# Patient Record
Sex: FEMALE | Race: BLACK OR AFRICAN AMERICAN | NOT HISPANIC OR LATINO | Employment: OTHER | URBAN - NONMETROPOLITAN AREA
[De-identification: names, ages, dates, MRNs, and addresses within clinical notes are randomized per-mention and may not be internally consistent; named-entity substitution may affect disease eponyms.]

---

## 2017-01-27 LAB
CHOLEST SERPL-MSCNC: 125 MG/DL (ref 0–200)
HDLC SERPL-MCNC: 81 MG/DL
LDLC SERPL CALC-MCNC: 34 MG/DL
TRIGL SERPL-MCNC: 50 MG/DL
TSH SERPL DL<=0.005 MIU/L-ACNC: 1.49 UIU/ML (ref 0.41–5.9)

## 2018-07-04 ENCOUNTER — HISTORICAL (OUTPATIENT)
Dept: ADMINISTRATIVE | Facility: HOSPITAL | Age: 83
End: 2018-07-04

## 2018-12-01 ENCOUNTER — HISTORICAL (OUTPATIENT)
Dept: ADMINISTRATIVE | Facility: HOSPITAL | Age: 83
End: 2018-12-01

## 2018-12-19 ENCOUNTER — HISTORICAL (OUTPATIENT)
Dept: ADMINISTRATIVE | Facility: HOSPITAL | Age: 83
End: 2018-12-19

## 2019-02-27 ENCOUNTER — TELEPHONE (OUTPATIENT)
Dept: FAMILY MEDICINE | Facility: CLINIC | Age: 84
End: 2019-02-27

## 2019-02-27 NOTE — TELEPHONE ENCOUNTER
----- Message from Lindsey Barber sent at 2/27/2019 10:04 AM CST -----  Contact: [id number]  Paperwork was dropped off last regarding medicaid long term care; were you able to complete it

## 2019-03-15 ENCOUNTER — OFFICE VISIT (OUTPATIENT)
Dept: RHEUMATOLOGY | Facility: CLINIC | Age: 84
End: 2019-03-15
Payer: MEDICARE

## 2019-03-15 VITALS
DIASTOLIC BLOOD PRESSURE: 60 MMHG | SYSTOLIC BLOOD PRESSURE: 110 MMHG | TEMPERATURE: 97 F | BODY MASS INDEX: 23.14 KG/M2 | WEIGHT: 144 LBS | HEIGHT: 66 IN | RESPIRATION RATE: 16 BRPM | HEART RATE: 72 BPM

## 2019-03-15 DIAGNOSIS — D50.0 IRON DEFICIENCY ANEMIA DUE TO CHRONIC BLOOD LOSS: ICD-10-CM

## 2019-03-15 DIAGNOSIS — E50.7 XEROPHTHALMIA: ICD-10-CM

## 2019-03-15 DIAGNOSIS — I25.10 CORONARY ARTERIOSCLEROSIS: ICD-10-CM

## 2019-03-15 DIAGNOSIS — M48.07 LUMBOSACRAL STENOSIS: ICD-10-CM

## 2019-03-15 DIAGNOSIS — M54.12 CERVICAL RADICULOPATHY: ICD-10-CM

## 2019-03-15 DIAGNOSIS — M11.259: ICD-10-CM

## 2019-03-15 DIAGNOSIS — M81.0 OSTEOPOROSIS, UNSPECIFIED OSTEOPOROSIS TYPE, UNSPECIFIED PATHOLOGICAL FRACTURE PRESENCE: ICD-10-CM

## 2019-03-15 DIAGNOSIS — J44.9 CHRONIC OBSTRUCTIVE PULMONARY DISEASE, UNSPECIFIED COPD TYPE: ICD-10-CM

## 2019-03-15 DIAGNOSIS — M79.7 FIBROSITIS: ICD-10-CM

## 2019-03-15 DIAGNOSIS — G56.00 CARPAL TUNNEL SYNDROME, UNSPECIFIED LATERALITY: ICD-10-CM

## 2019-03-15 DIAGNOSIS — M06.9 RHEUMATOID ARTHRITIS, INVOLVING UNSPECIFIED SITE, UNSPECIFIED RHEUMATOID FACTOR PRESENCE: Primary | ICD-10-CM

## 2019-03-15 DIAGNOSIS — E11.42 DIABETIC POLYNEUROPATHY ASSOCIATED WITH TYPE 2 DIABETES MELLITUS: ICD-10-CM

## 2019-03-15 PROBLEM — M47.817 LUMBOSACRAL SPONDYLOSIS: Status: ACTIVE | Noted: 2019-03-15

## 2019-03-15 PROBLEM — I27.0: Status: ACTIVE | Noted: 2019-03-15

## 2019-03-15 PROBLEM — M54.30 SCIATICA: Status: ACTIVE | Noted: 2019-03-15

## 2019-03-15 PROBLEM — R91.1 SOLITARY PULMONARY NODULE: Status: ACTIVE | Noted: 2019-03-15

## 2019-03-15 PROBLEM — E11.40 DIABETIC NEUROPATHY: Status: ACTIVE | Noted: 2019-03-15

## 2019-03-15 PROBLEM — M16.9 OSTEOARTHRITIS OF HIP: Status: ACTIVE | Noted: 2019-03-15

## 2019-03-15 PROBLEM — D50.9 IRON DEFICIENCY ANEMIA: Status: ACTIVE | Noted: 2019-03-15

## 2019-03-15 PROBLEM — M70.60 TROCHANTERIC BURSITIS: Status: ACTIVE | Noted: 2019-03-15

## 2019-03-15 PROCEDURE — 99214 PR OFFICE/OUTPT VISIT, EST, LEVL IV, 30-39 MIN: ICD-10-PCS | Mod: S$GLB,,, | Performed by: INTERNAL MEDICINE

## 2019-03-15 PROCEDURE — 99214 OFFICE O/P EST MOD 30 MIN: CPT | Mod: S$GLB,,, | Performed by: INTERNAL MEDICINE

## 2019-03-15 RX ORDER — FAMOTIDINE 40 MG/1
TABLET, FILM COATED ORAL
Refills: 6 | COMMUNITY
Start: 2019-02-26 | End: 2020-10-23

## 2019-03-15 RX ORDER — AMLODIPINE BESYLATE 5 MG/1
5 TABLET ORAL EVERY MORNING
Refills: 6 | COMMUNITY
Start: 2019-03-01 | End: 2020-07-06

## 2019-03-15 RX ORDER — PREDNISONE 10 MG/1
TABLET ORAL DAILY
Qty: 30 TABLET | Refills: 2 | Status: SHIPPED | OUTPATIENT
Start: 2019-03-15 | End: 2019-03-25

## 2019-03-15 RX ORDER — RANOLAZINE 500 MG/1
500 TABLET, EXTENDED RELEASE ORAL 2 TIMES DAILY
COMMUNITY
End: 2020-01-07 | Stop reason: SDUPTHER

## 2019-03-15 RX ORDER — METOPROLOL SUCCINATE 50 MG/1
50 TABLET, EXTENDED RELEASE ORAL DAILY
Refills: 3 | COMMUNITY
Start: 2019-03-05 | End: 2020-06-12 | Stop reason: SDUPTHER

## 2019-03-15 RX ORDER — NITROGLYCERIN 0.4 MG/1
TABLET SUBLINGUAL
COMMUNITY
End: 2019-06-25 | Stop reason: SDUPTHER

## 2019-03-15 RX ORDER — SPIRONOLACTONE 25 MG/1
TABLET ORAL
Refills: 5 | COMMUNITY
Start: 2019-03-01 | End: 2019-08-12 | Stop reason: SDUPTHER

## 2019-03-15 RX ORDER — PREDNISONE 10 MG/1
TABLET ORAL
COMMUNITY
End: 2019-05-28 | Stop reason: SDUPTHER

## 2019-03-15 RX ORDER — ATORVASTATIN CALCIUM 40 MG/1
40 TABLET, FILM COATED ORAL NIGHTLY
Refills: 3 | COMMUNITY
Start: 2019-03-06 | End: 2020-05-20

## 2019-03-15 RX ORDER — PREDNISONE 10 MG/1
TABLET ORAL
Qty: 30 TABLET | Refills: 4 | Status: CANCELLED | OUTPATIENT
Start: 2019-03-15

## 2019-03-15 RX ORDER — CEPHALEXIN 250 MG
CAPSULE ORAL
COMMUNITY
End: 2020-07-28

## 2019-03-15 RX ORDER — TRAZODONE HYDROCHLORIDE 50 MG/1
TABLET ORAL
COMMUNITY
End: 2020-07-28

## 2019-03-15 RX ORDER — ASPIRIN 81 MG/1
TABLET ORAL
COMMUNITY
End: 2020-12-14

## 2019-03-15 RX ORDER — DICLOFENAC SODIUM 10 MG/G
GEL TOPICAL
Refills: 0 | COMMUNITY
Start: 2018-12-07 | End: 2019-06-30 | Stop reason: SDUPTHER

## 2019-03-15 RX ORDER — CYCLOSPORINE 0.5 MG/ML
EMULSION OPHTHALMIC
COMMUNITY

## 2019-03-15 RX ORDER — CYCLOBENZAPRINE HCL 5 MG
TABLET ORAL
COMMUNITY
End: 2019-06-18 | Stop reason: SDUPTHER

## 2019-03-15 RX ORDER — GABAPENTIN 300 MG/1
300 CAPSULE ORAL 2 TIMES DAILY
Refills: 3 | COMMUNITY
Start: 2019-03-01 | End: 2019-08-12 | Stop reason: SDUPTHER

## 2019-03-15 NOTE — PROGRESS NOTES
Subjective:       Patient ID: Martha Caraballo is a 88 y.o. female.    Chief Complaint: Follow-up    HPI ADMITTED  to [facility] in [facility] in October with rectal hemorrhage No bleeding site GI bleeding. 2nd amission  Slipped and fall in bathtub no known diagnois. Admitted to [facility] for High Blood pressure out of control  Finally management but hangina and treated with Ranexa with improment of chest pain.with multple arthralgia.Currently on Gabapentin  300 mg Bid. Request a mild analgesic        Current medications include:  Current Outpatient Medications on File Prior to Visit   Medication Sig Dispense Refill    amLODIPine (NORVASC) 5 MG tablet Take 5 mg by mouth every morning.  6    aspirin (ADULT LOW DOSE ASPIRIN) 81 MG EC tablet Adult Low Dose Aspirin   daily      atorvastatin (LIPITOR) 40 MG tablet Take 40 mg by mouth nightly.  3    cyclobenzaprine (FLEXERIL) 5 MG tablet cyclobenzaprine 5 mg tablet   TAKE 1 TABLET BY MOUTH TWICE A DAY      cycloSPORINE (RESTASIS) 0.05 % ophthalmic emulsion Restasis 0.05 % eye drops in a dropperette      famotidine (PEPCID) 40 MG tablet TAKE 1 TABLET BY MOUTH EVERY DAY FOR STOMACH  6    ferrous fumarate-folic acid 324 mg, 106mg iron,-1mg (HEMOCYTE-F) Tab Hematinic/Folic Acid 324 mg (106 mg iron)-1 mg tablet   TAKE 1 TABLET BY MOUTH EVERY DAY      gabapentin (NEURONTIN) 300 MG capsule Take 300 mg by mouth 2 (two) times daily.  3    metoprolol succinate (TOPROL-XL) 50 MG 24 hr tablet Take 50 mg by mouth once daily.  3    nitroGLYCERIN (NITROSTAT) 0.4 MG SL tablet nitroglycerin 0.4 mg sublingual tablet      omega-3s/dha/epa/fish oil/D3 (VITAMIN-D + OMEGA-3 ORAL) Vitamin D   1200mg daily      predniSONE (DELTASONE) 10 MG tablet prednisone 10 mg tablet   1 TAB BY MOUTH 3X WITH MEALS X 3 DAYS,1 TAB 2 TIMES DAILY X 2 DAYS, 1 TAB ONCE A DAY X2 DAYS      ranolazine (RANEXA) 500 MG Tb12 Take 500 mg by mouth 2 (two) times daily.      spironolactone (ALDACTONE) 25 MG  "tablet TAKE ONE TABLET BY MOUTH EVERY MORNING FOR BLOOD PRESSURE  5    traZODone (DESYREL) 50 MG tablet trazodone 50 mg tablet   Take 1 tablet once daily at bedtime      vitamin E 100 unit/0.25 mL Drop vitamin E   daily      VOLTAREN 1 % Gel APPLY TO THE AFFECTED AREA 3 TIMES DAILY FOR SCIATICA PAIN  0     No current facility-administered medications on file prior to visit.        Lab Results:  No results found for: WBC, RBC, HGB, HCT, MCV, COLORU, SPECGRAV, PHUR, WBCUR, NITRITE, GLUCOSEUR, KETONESU, BILIRUBINUR, RBCUR, NA, K, CL, CO2, GLU, BUN, CREATININE     Review of Systems   Constitutional:        N ight weats   HENT:        Dry eyes on restasis and artificial tear drops    Eyes: Negative.    Respiratory: Negative.    Cardiovascular:        Angina pectoris on Ranexa   Gastrointestinal:        Rectal bleeding   Endocrine: Negative.    Genitourinary: Negative.    Musculoskeletal: Positive for arthralgias and back pain.   Allergic/Immunologic: Negative.    Neurological:        Occasional sciatica oain   Hematological:        GI loss  Of blood admission to Hospital   Psychiatric/Behavioral: Negative.          Objective:   /60 (BP Location: Right arm, Patient Position: Sitting, BP Method: Small (Manual))   Pulse 72   Temp 97 °F (36.1 °C) (Temporal)   Resp 16   Ht 5' 6" (1.676 m)   Wt 65.3 kg (144 lb)   BMI 23.24 kg/m²      Physical Exam      Assessment:       1. Rheumatoid arthritis, involving unspecified site, unspecified rheumatoid factor presence    2. 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 Xerophthalmia    4. Lumbosacral stenosis    5. Diabetic polyneuropathy associated with type 2 diabetes mellitus    6. Cervical radiculopathy    7. Carpal tunnel syndrome, unspecified laterality    8. Chronic obstructive pulmonary disease, unspecified COPD type    9. Coronary arteriosclerosis    10. Iron deficiency anemia due to chronic " blood loss    11. Osteoporosis, unspecified osteoporosis type, unspecified pathological fracture presence    12. Fibrositis            Plan:       Has incresed polyarthalgia

## 2019-03-20 DIAGNOSIS — M54.17 LUMBOSACRAL RADICULOPATHY: Primary | ICD-10-CM

## 2019-03-20 RX ORDER — TRAMADOL HYDROCHLORIDE 50 MG/1
50 TABLET ORAL 3 TIMES DAILY PRN
Qty: 90 TABLET | Refills: 2 | Status: SHIPPED | OUTPATIENT
Start: 2019-03-20 | End: 2019-12-18

## 2019-04-15 RX ORDER — ALPRAZOLAM 0.25 MG/1
0.25 TABLET ORAL 3 TIMES DAILY PRN
Qty: 30 TABLET | Refills: 2 | Status: SHIPPED | OUTPATIENT
Start: 2019-04-15 | End: 2020-04-28

## 2019-05-10 ENCOUNTER — TELEPHONE (OUTPATIENT)
Dept: FAMILY MEDICINE | Facility: CLINIC | Age: 84
End: 2019-05-10

## 2019-05-10 DIAGNOSIS — R07.9 CHEST PAIN, UNSPECIFIED TYPE: Primary | ICD-10-CM

## 2019-05-10 NOTE — TELEPHONE ENCOUNTER
Patient is requesting a referral to see a cardiologist and left the antihistamine she has been with chest pain that has been progressively worsening since a year ago or more and would like to have 2nd opinion.

## 2019-05-28 ENCOUNTER — OFFICE VISIT (OUTPATIENT)
Dept: FAMILY MEDICINE | Facility: CLINIC | Age: 84
End: 2019-05-28
Payer: MEDICARE

## 2019-05-28 VITALS
HEART RATE: 74 BPM | RESPIRATION RATE: 14 BRPM | DIASTOLIC BLOOD PRESSURE: 71 MMHG | WEIGHT: 140 LBS | BODY MASS INDEX: 22.6 KG/M2 | OXYGEN SATURATION: 99 % | SYSTOLIC BLOOD PRESSURE: 129 MMHG

## 2019-05-28 DIAGNOSIS — R09.1 PLEURISY: ICD-10-CM

## 2019-05-28 DIAGNOSIS — M94.0 COSTOCHONDRITIS: Primary | ICD-10-CM

## 2019-05-28 DIAGNOSIS — Z12.39 BREAST CANCER SCREENING: ICD-10-CM

## 2019-05-28 PROCEDURE — 96372 THER/PROPH/DIAG INJ SC/IM: CPT | Mod: S$GLB,,, | Performed by: NURSE PRACTITIONER

## 2019-05-28 PROCEDURE — 99213 OFFICE O/P EST LOW 20 MIN: CPT | Mod: 25,S$GLB,, | Performed by: FAMILY MEDICINE

## 2019-05-28 PROCEDURE — 96372 PR INJECTION,THERAP/PROPH/DIAG2ST, IM OR SUBCUT: ICD-10-PCS | Mod: S$GLB,,, | Performed by: NURSE PRACTITIONER

## 2019-05-28 PROCEDURE — 99213 PR OFFICE/OUTPT VISIT, EST, LEVL III, 20-29 MIN: ICD-10-PCS | Mod: 25,S$GLB,, | Performed by: FAMILY MEDICINE

## 2019-05-28 RX ORDER — PREDNISONE 10 MG/1
10 TABLET ORAL DAILY
Qty: 30 TABLET | Refills: 2 | Status: SHIPPED | OUTPATIENT
Start: 2019-05-28 | End: 2020-04-28

## 2019-05-28 RX ORDER — METHYLPREDNISOLONE ACETATE 40 MG/ML
40 INJECTION, SUSPENSION INTRA-ARTICULAR; INTRALESIONAL; INTRAMUSCULAR; SOFT TISSUE
Status: COMPLETED | OUTPATIENT
Start: 2019-05-28 | End: 2019-05-28

## 2019-05-28 RX ADMIN — METHYLPREDNISOLONE ACETATE 40 MG: 40 INJECTION, SUSPENSION INTRA-ARTICULAR; INTRALESIONAL; INTRAMUSCULAR; SOFT TISSUE at 03:05

## 2019-05-29 NOTE — PROGRESS NOTES
"Subjective:       Patient ID: Martha Caraballo is a 88 y.o. female.    Chief Complaint: Follow-up (She would like you to look at  her right " groin area." She said she has a knot.) and Medication Refill (Refill on NTG)    89 yo female with complaint of chest wall pain.  She asked for a referral to another cardiologist for a second opinion.  She has a [facility] on last week.  She states her son was told that it looked okay and no further intervention was needed.  She states that she guess it is arthritis causing he chest wall pain that radiates into her back.  She states it becomes severe and pain meds only helps minimally.  She is unable to take NSAIDS because of hx of rectal hemorrhage. She also need an order for her annual screening mammogram.      Review of Systems   Constitutional: Negative for fever.   HENT: Negative for ear pain, postnasal drip, rhinorrhea, sinus pain and sore throat.    Eyes: Negative for redness.   Respiratory: Negative for cough, chest tightness, shortness of breath and wheezing.    Cardiovascular: Positive for chest pain. Negative for palpitations and leg swelling.   Gastrointestinal: Negative for constipation, diarrhea, nausea and vomiting.   Genitourinary: Negative for difficulty urinating and dysuria.   Musculoskeletal: Negative for arthralgias.   Skin: Negative for rash.   Neurological: Negative for dizziness.       Objective:      Physical Exam   Constitutional: She is oriented to person, place, and time. She appears well-developed and well-nourished.   HENT:   Head: Normocephalic and atraumatic.   Eyes: Pupils are equal, round, and reactive to light. Conjunctivae and EOM are normal.   Neck: Normal range of motion. Neck supple.   Cardiovascular: Normal rate, regular rhythm and normal heart sounds.   Pulmonary/Chest: Effort normal and breath sounds normal. She has no wheezes. She has no rales.         She exhibits tenderness and bony tenderness. She exhibits no crepitus.   Muscle spasms " palpated to the left upper mid back.       Abdominal: Soft. Bowel sounds are normal. She exhibits no distension and no mass. There is no tenderness. There is no guarding.   Musculoskeletal: Normal range of motion. She exhibits no edema or tenderness.   Neurological: She is alert and oriented to person, place, and time. No cranial nerve deficit.   Skin: Skin is warm and dry. No rash noted. No erythema.   Psychiatric: She has a normal mood and affect. Her behavior is normal.   Nursing note and vitals reviewed.      Assessment:       1. Costochondritis    2. Pleurisy    3. Breast cancer screening        Plan:     Depo-Medrol 40 mg IM in clinic today.  Prednisone 10 mg daily for inflammation to the chest wall since NSAIDS are contraindicated in this patient.  Order annual screening mammogram today.  Follow up  In 1 month.

## 2019-06-18 ENCOUNTER — OFFICE VISIT (OUTPATIENT)
Dept: RHEUMATOLOGY | Facility: CLINIC | Age: 84
End: 2019-06-18
Payer: MEDICARE

## 2019-06-18 VITALS
RESPIRATION RATE: 16 BRPM | BODY MASS INDEX: 22.34 KG/M2 | SYSTOLIC BLOOD PRESSURE: 120 MMHG | TEMPERATURE: 97 F | HEART RATE: 72 BPM | DIASTOLIC BLOOD PRESSURE: 60 MMHG | HEIGHT: 66 IN | WEIGHT: 139 LBS

## 2019-06-18 DIAGNOSIS — M79.7 FIBROSITIS: ICD-10-CM

## 2019-06-18 DIAGNOSIS — M16.9 OSTEOARTHRITIS OF HIP, UNSPECIFIED LATERALITY, UNSPECIFIED OSTEOARTHRITIS TYPE: ICD-10-CM

## 2019-06-18 DIAGNOSIS — E11.42 DIABETIC POLYNEUROPATHY ASSOCIATED WITH TYPE 2 DIABETES MELLITUS: ICD-10-CM

## 2019-06-18 DIAGNOSIS — M54.30 SCIATICA, UNSPECIFIED LATERALITY: ICD-10-CM

## 2019-06-18 DIAGNOSIS — E50.7 XEROPHTHALMIA: ICD-10-CM

## 2019-06-18 DIAGNOSIS — M70.60 TROCHANTERIC BURSITIS, UNSPECIFIED LATERALITY: ICD-10-CM

## 2019-06-18 DIAGNOSIS — R25.2 SPASM: Primary | ICD-10-CM

## 2019-06-18 DIAGNOSIS — D50.0 IRON DEFICIENCY ANEMIA DUE TO CHRONIC BLOOD LOSS: ICD-10-CM

## 2019-06-18 DIAGNOSIS — M11.259: ICD-10-CM

## 2019-06-18 DIAGNOSIS — G56.00 CARPAL TUNNEL SYNDROME, UNSPECIFIED LATERALITY: ICD-10-CM

## 2019-06-18 DIAGNOSIS — M54.12 CERVICAL RADICULOPATHY: ICD-10-CM

## 2019-06-18 DIAGNOSIS — I25.10 CORONARY ARTERIOSCLEROSIS: ICD-10-CM

## 2019-06-18 DIAGNOSIS — I27.0 IDIOPATHIC PAH (PULMONARY ARTERIAL HYPERTENSION): ICD-10-CM

## 2019-06-18 PROBLEM — M94.0 COSTAL CHONDRITIS: Status: ACTIVE | Noted: 2019-01-24
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
## 2019-06-18 PROBLEM — M19.019 OSTEOARTHRITIS OF ACROMIOCLAVICULAR JOINT: Status: ACTIVE | Noted: 2019-06-18

## 2019-06-18 PROBLEM — M54.9 BACKACHE WITH RADIATION: Status: ACTIVE | Noted: 2019-06-18

## 2019-06-18 PROBLEM — H35.349 MACULAR HOLE: Status: ACTIVE | Noted: 2019-06-18

## 2019-06-18 PROCEDURE — 99214 OFFICE O/P EST MOD 30 MIN: CPT | Mod: S$GLB,,, | Performed by: INTERNAL MEDICINE

## 2019-06-18 PROCEDURE — 99214 PR OFFICE/OUTPT VISIT, EST, LEVL IV, 30-39 MIN: ICD-10-PCS | Mod: S$GLB,,, | Performed by: INTERNAL MEDICINE

## 2019-06-18 RX ORDER — CYCLOBENZAPRINE HCL 5 MG
TABLET ORAL
Qty: 60 TABLET | Refills: 4 | Status: SHIPPED | OUTPATIENT
Start: 2019-06-18 | End: 2019-06-24 | Stop reason: CLARIF

## 2019-06-18 NOTE — PROGRESS NOTES
Subjective:       Patient ID: Martha Caraballo is a 88 y.o. female.    Chief Complaint: Follow-up    HPI Had costal cage pain epigastric and below shoulder dseen by cardiologis who felt it was costchondritis but given Ranexa for chest pain feels better but some residual chest pain. Chest flexion and rotation makes chest pain. Whole hand hurt daily sometime swell . Takes Gabapentin 300 bid        Current medications include:  Current Outpatient Medications on File Prior to Visit   Medication Sig Dispense Refill    ALPRAZolam (XANAX) 0.25 MG tablet Take 1 tablet (0.25 mg total) by mouth 3 (three) times daily as needed for Anxiety. 30 tablet 2    amLODIPine (NORVASC) 5 MG tablet Take 5 mg by mouth every morning.  6    aspirin (ADULT LOW DOSE ASPIRIN) 81 MG EC tablet Adult Low Dose Aspirin   daily      atorvastatin (LIPITOR) 40 MG tablet Take 40 mg by mouth nightly.  3    cyclobenzaprine (FLEXERIL) 5 MG tablet cyclobenzaprine 5 mg tablet   TAKE 1 TABLET BY MOUTH TWICE A DAY      cycloSPORINE (RESTASIS) 0.05 % ophthalmic emulsion Restasis 0.05 % eye drops in a dropperette      famotidine (PEPCID) 40 MG tablet TAKE 1 TABLET BY MOUTH EVERY DAY FOR STOMACH  6    ferrous fumarate-folic acid 324 mg, 106mg iron,-1mg (HEMOCYTE-F) Tab Hematinic/Folic Acid 324 mg (106 mg iron)-1 mg tablet   TAKE 1 TABLET BY MOUTH EVERY DAY      gabapentin (NEURONTIN) 300 MG capsule Take 300 mg by mouth 2 (two) times daily.  3    metoprolol succinate (TOPROL-XL) 50 MG 24 hr tablet Take 50 mg by mouth once daily.  3    nitroGLYCERIN (NITROSTAT) 0.4 MG SL tablet nitroglycerin 0.4 mg sublingual tablet      omega-3s/dha/epa/fish oil/D3 (VITAMIN-D + OMEGA-3 ORAL) Vitamin D   1200mg daily      predniSONE (DELTASONE) 10 MG tablet Take 1 tablet (10 mg total) by mouth once daily. 30 tablet 2    ranolazine (RANEXA) 500 MG Tb12 Take 500 mg by mouth 2 (two) times daily.      spironolactone (ALDACTONE) 25 MG tablet TAKE ONE TABLET BY MOUTH EVERY  "MORNING FOR BLOOD PRESSURE  5    traMADol (ULTRAM) 50 mg tablet Take 1 tablet (50 mg total) by mouth 3 (three) times daily as needed for Pain. 90 tablet 2    traZODone (DESYREL) 50 MG tablet trazodone 50 mg tablet   Take 1 tablet once daily at bedtime      vitamin E 100 unit/0.25 mL Drop vitamin E   daily      VOLTAREN 1 % Gel APPLY TO THE AFFECTED AREA 3 TIMES DAILY FOR SCIATICA PAIN  0     No current facility-administered medications on file prior to visit.        Lab Results:  No results found for: WBC, RBC, HGB, HCT, MCV, COLORU, SPECGRAV, PHUR, WBCUR, NITRITE, GLUCOSEUR, KETONESU, BILIRUBINUR, RBCUR, NA, K, CL, CO2, GLU, BUN, CREATININE     Review of Systems   Constitutional: Negative.    HENT:        Dry eyes on restasis occasional dry mouth   Eyes: Negative.    Respiratory: Negative.    Cardiovascular: Positive for chest pain.   Gastrointestinal: Positive for constipation.   Endocrine: Negative.    Genitourinary: Negative.    Musculoskeletal: Positive for arthralgias and back pain.   Allergic/Immunologic: Positive for environmental allergies.   Neurological: Negative.    Hematological:        Anemia dx of Thalasemia   Psychiatric/Behavioral: Negative.          Objective:   /60 (BP Location: Right arm, Patient Position: Sitting, BP Method: Small (Manual))   Pulse 72   Temp 96.8 °F (36 °C) (Temporal)   Resp 16   Ht 5' 6" (1.676 m)   Wt 63 kg (139 lb)   BMI 22.44 kg/m²      Physical Exam   Constitutional: She is well-developed, well-nourished, and in no distress.   HENT:   drynessin eyes and mouth and eyes on restasis   Eyes: Conjunctivae and EOM are normal. Pupils are equal, round, and reactive to light.   Neck: Normal range of motion. Neck supple.   Cardiovascular: Normal rate and regular rhythm.    Pulmonary/Chest: Effort normal and breath sounds normal.   Abdominal: Soft. Bowel sounds are normal.   Neurological:   SLR=negative   Skin: Skin is warm and dry.     Musculoskeletal: Normal range of " motion.           Assessment:       1. Spasm    2. 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 Fibrositis    4. Osteoarthritis of hip, unspecified laterality, unspecified osteoarthritis type    5. Sciatica, unspecified laterality    6. Trochanteric bursitis, unspecified laterality    7. Iron deficiency anemia due to chronic blood loss    8. Coronary arteriosclerosis    9. Idiopathic PAH (pulmonary arterial hypertension)    10. Xerophthalmia    11. Cervical radiculopathy    12. Diabetic polyneuropathy associated with type 2 diabetes mellitus    13. Carpal tunnel syndrome, unspecified laterality            Plan:       Continue same medications on wall

## 2019-06-24 DIAGNOSIS — M54.12 CERVICAL RADICULOPATHY: Primary | ICD-10-CM

## 2019-06-24 RX ORDER — TIZANIDINE 2 MG/1
2 TABLET ORAL EVERY 8 HOURS
Qty: 30 TABLET | Refills: 5 | Status: SHIPPED | OUTPATIENT
Start: 2019-06-24 | End: 2019-06-25

## 2019-06-25 ENCOUNTER — OFFICE VISIT (OUTPATIENT)
Dept: FAMILY MEDICINE | Facility: CLINIC | Age: 84
End: 2019-06-25
Payer: MEDICARE

## 2019-06-25 VITALS
WEIGHT: 141 LBS | DIASTOLIC BLOOD PRESSURE: 84 MMHG | OXYGEN SATURATION: 100 % | SYSTOLIC BLOOD PRESSURE: 133 MMHG | TEMPERATURE: 97 F | HEART RATE: 82 BPM | BODY MASS INDEX: 22.76 KG/M2

## 2019-06-25 DIAGNOSIS — M47.27 OSTEOARTHRITIS OF SPINE WITH RADICULOPATHY, LUMBOSACRAL REGION: ICD-10-CM

## 2019-06-25 DIAGNOSIS — I20.89 ANGINA AT REST: ICD-10-CM

## 2019-06-25 DIAGNOSIS — M47.817 SPONDYLOSIS OF LUMBOSACRAL REGION WITHOUT MYELOPATHY OR RADICULOPATHY: ICD-10-CM

## 2019-06-25 DIAGNOSIS — L30.9 DERMATITIS: ICD-10-CM

## 2019-06-25 DIAGNOSIS — K57.92 DIVERTICULITIS: ICD-10-CM

## 2019-06-25 PROCEDURE — 99214 OFFICE O/P EST MOD 30 MIN: CPT | Mod: S$GLB,,, | Performed by: FAMILY MEDICINE

## 2019-06-25 PROCEDURE — 99214 PR OFFICE/OUTPT VISIT, EST, LEVL IV, 30-39 MIN: ICD-10-PCS | Mod: S$GLB,,, | Performed by: FAMILY MEDICINE

## 2019-06-25 RX ORDER — CIPROFLOXACIN 500 MG/1
500 TABLET ORAL EVERY 12 HOURS
Qty: 20 TABLET | Refills: 0 | Status: SHIPPED | OUTPATIENT
Start: 2019-06-25 | End: 2019-06-25 | Stop reason: SDUPTHER

## 2019-06-25 RX ORDER — CIPROFLOXACIN 500 MG/1
500 TABLET ORAL EVERY 12 HOURS
Qty: 20 TABLET | Refills: 0 | Status: SHIPPED | OUTPATIENT
Start: 2019-06-25 | End: 2019-09-18

## 2019-06-25 RX ORDER — ISOSORBIDE MONONITRATE 30 MG/1
1 TABLET, EXTENDED RELEASE ORAL DAILY
COMMUNITY
Start: 2019-06-20 | End: 2020-01-07 | Stop reason: SDUPTHER

## 2019-06-25 RX ORDER — NITROGLYCERIN 0.4 MG/1
0.4 TABLET SUBLINGUAL EVERY 5 MIN PRN
Qty: 30 TABLET | Refills: 2 | Status: SHIPPED | OUTPATIENT
Start: 2019-06-25

## 2019-06-25 RX ORDER — CYCLOBENZAPRINE HCL 5 MG
5 TABLET ORAL 3 TIMES DAILY
Qty: 30 TABLET | Refills: 3 | Status: SHIPPED | OUTPATIENT
Start: 2019-06-25 | End: 2019-07-05

## 2019-06-25 RX ORDER — HYDROCODONE BITARTRATE AND ACETAMINOPHEN 5; 325 MG/1; MG/1
1 TABLET ORAL EVERY 6 HOURS PRN
Qty: 28 TABLET | Refills: 0 | Status: SHIPPED | OUTPATIENT
Start: 2019-06-25 | End: 2019-07-02

## 2019-06-25 RX ORDER — CLOTRIMAZOLE AND BETAMETHASONE DIPROPIONATE 10; .64 MG/G; MG/G
CREAM TOPICAL 2 TIMES DAILY
Qty: 45 TUBE | Refills: 0 | Status: SHIPPED | OUTPATIENT
Start: 2019-06-25 | End: 2020-12-14

## 2019-06-25 NOTE — PROGRESS NOTES
Subjective:       Patient ID: Martha Caraballo is a 88 y.o. female.    Chief Complaint: Follow-up    87 yo  Female in for follow up.  She states that she has been feeling a little better since she was started on isorsorbide nitrates by the cardiologist that she saw in [place].  She had a restenosis of a stent in the RCA.  She reports that she has given 30 days to take the medication and then follow up again.  She also has some discomfort in lower abdomen which is what she experiences when she has the diverticulitis flare-up S she is asking for some Cipro to take for it.  She also needs a refill of hydrocodone that she takes for sciatic nerve pain. She also needs a refill of clotrimazole betamethasone cream that she takes for recurrence rash of her buttocks area.  She also wants me to send a prescription for Flexeril to [facility] [place].  She also needs a refill on nitroglycerin tablets.  She still has some chest pain it is just not as severe as it was previously.    Review of Systems   Constitutional: Negative for fever.   HENT: Negative for ear pain, postnasal drip, rhinorrhea, sinus pain and sore throat.    Eyes: Negative for redness.   Respiratory: Negative for cough, chest tightness, shortness of breath and wheezing.    Cardiovascular: Positive for chest pain. Negative for palpitations and leg swelling.   Gastrointestinal: Negative for constipation, diarrhea, nausea and vomiting.   Genitourinary: Negative for difficulty urinating and dysuria.   Musculoskeletal: Positive for arthralgias, back pain, myalgias and neck pain.   Skin: Negative for rash.   Neurological: Negative for dizziness.       Objective:      Physical Exam   Constitutional: She is oriented to person, place, and time. She appears well-developed and well-nourished.   HENT:   Head: Normocephalic and atraumatic.   Eyes: Pupils are equal, round, and reactive to light. Conjunctivae and EOM are normal.   Neck: Normal range of motion.  Neck supple.   Cardiovascular: Normal rate, regular rhythm and normal heart sounds.   Pulmonary/Chest: Breath sounds normal. She has no wheezes. She has no rales.   Abdominal: Soft. Bowel sounds are normal. She exhibits no distension and no mass. There is no tenderness. There is no guarding.   Musculoskeletal: She exhibits no edema.        Lumbar back: She exhibits tenderness, pain and spasm. She exhibits normal range of motion.   Neurological: She is alert and oriented to person, place, and time. No cranial nerve deficit.   Skin: Skin is warm and dry. Rash noted. No erythema.        Papular eruption to the buttocks crease with mild erythema.   Psychiatric: She has a normal mood and affect. Her behavior is normal.   Nursing note and vitals reviewed.      Assessment:       1. Angina at rest    2. Dermatitis    3. Osteoarthritis of spine with radiculopathy, lumbosacral region    4. Diverticulitis    5. 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  Ultrasound cream to be applied twice daily to rash of buttocks.  Refill hydrocodone 5 mg to be taken 1 every 6 hr as needed for pain #28 no refill.  Ciprofloxacin 500 mg p.o. b.i.d. for diverticulitis.  Flexeril 5 mg b.i.d. for muscle spasms.  Follow-up in 3 months.

## 2019-06-30 DIAGNOSIS — M47.16 OSTEOARTHRITIS OF SPINE WITH MYELOPATHY, LUMBOSACRAL REGION: Primary | ICD-10-CM

## 2019-06-30 DIAGNOSIS — M47.16 OSTEOARTHRITIS OF SPINE WITH MYELOPATHY, LUMBOSACRAL REGION: ICD-10-CM

## 2019-06-30 RX ORDER — DICLOFENAC SODIUM 10 MG/G
GEL TOPICAL
Qty: 100 G | Refills: 5 | Status: SHIPPED | OUTPATIENT
Start: 2019-06-30 | End: 2019-06-30 | Stop reason: SDUPTHER

## 2019-06-30 RX ORDER — DICLOFENAC SODIUM 10 MG/G
GEL TOPICAL 3 TIMES DAILY
Qty: 100 G | Refills: 5 | Status: SHIPPED | OUTPATIENT
Start: 2019-06-30 | End: 2019-06-30 | Stop reason: SDUPTHER

## 2019-06-30 RX ORDER — DICLOFENAC SODIUM 10 MG/G
GEL TOPICAL
Qty: 100 G | Refills: 5 | Status: SHIPPED | OUTPATIENT
Start: 2019-06-30 | End: 2020-07-28

## 2019-07-25 ENCOUNTER — TELEPHONE (OUTPATIENT)
Dept: FAMILY MEDICINE | Facility: CLINIC | Age: 84
End: 2019-07-25

## 2019-07-25 NOTE — TELEPHONE ENCOUNTER
----- Message from Gia Hills sent at 7/25/2019  9:46 AM CDT -----  Contact:  Lindsey [facility]  [phone]  Patient is having burning with urination. Need an order to collect specimen

## 2019-07-26 LAB
AMORPH URATE CRY URNS QL MICRO: NEGATIVE
BACTERIA #/AREA URNS HPF: NORMAL /[HPF]
BILIRUB UR QL STRIP: NEGATIVE
CLARITY UR: CLEAR
COLOR UR: YELLOW
EPITHELIAL CELLS: NORMAL
GLUCOSE (UA): NEGATIVE MG/DL
KETONES UR QL STRIP: NEGATIVE MG/DL
LEUKOCYTE ESTERASE UR QL STRIP: NEGATIVE
MUCOUS THREADS URNS QL MICRO: NEGATIVE
NITRITE UR QL STRIP: NEGATIVE
OCCULT BLOOD: NEGATIVE
PH, URINE: 6 (ref 5–7.5)
PROT UR QL STRIP: NEGATIVE MG/DL
RBC/HPF: NEGATIVE
SP GR UR STRIP: 1.01 (ref 1–1.03)
UROBILINOGEN, URINE: NORMAL E.U./DL (ref 0–1)
WBC/HPF: NEGATIVE

## 2019-07-29 ENCOUNTER — TELEPHONE (OUTPATIENT)
Dept: FAMILY MEDICINE | Facility: CLINIC | Age: 84
End: 2019-07-29

## 2019-08-12 DIAGNOSIS — I10 ESSENTIAL HYPERTENSION: ICD-10-CM

## 2019-08-12 DIAGNOSIS — M54.30 SCIATICA, UNSPECIFIED LATERALITY: Primary | ICD-10-CM

## 2019-08-12 DIAGNOSIS — M54.9 BACKACHE WITH RADIATION: ICD-10-CM

## 2019-08-12 RX ORDER — SPIRONOLACTONE 25 MG/1
25 TABLET ORAL DAILY
Qty: 30 TABLET | Refills: 5 | Status: SHIPPED | OUTPATIENT
Start: 2019-08-12 | End: 2020-02-03

## 2019-08-12 RX ORDER — GABAPENTIN 300 MG/1
300 CAPSULE ORAL 2 TIMES DAILY
Qty: 180 CAPSULE | Refills: 3 | Status: SHIPPED | OUTPATIENT
Start: 2019-08-12 | End: 2020-07-20 | Stop reason: SDUPTHER

## 2019-09-18 ENCOUNTER — OFFICE VISIT (OUTPATIENT)
Dept: RHEUMATOLOGY | Facility: CLINIC | Age: 84
End: 2019-09-18
Payer: MEDICARE

## 2019-09-18 VITALS
HEART RATE: 66 BPM | SYSTOLIC BLOOD PRESSURE: 130 MMHG | WEIGHT: 142 LBS | TEMPERATURE: 97 F | RESPIRATION RATE: 16 BRPM | BODY MASS INDEX: 22.82 KG/M2 | HEIGHT: 66 IN | DIASTOLIC BLOOD PRESSURE: 60 MMHG

## 2019-09-18 DIAGNOSIS — M70.60 TROCHANTERIC BURSITIS, UNSPECIFIED LATERALITY: ICD-10-CM

## 2019-09-18 DIAGNOSIS — I27.0 IDIOPATHIC PAH (PULMONARY ARTERIAL HYPERTENSION): ICD-10-CM

## 2019-09-18 DIAGNOSIS — Z79.899 HISTORY OF ONGOING TREATMENT WITH HIGH-RISK MEDICATION: ICD-10-CM

## 2019-09-18 DIAGNOSIS — M43.07 LUMBOSACRAL SPONDYLOLYSIS: ICD-10-CM

## 2019-09-18 DIAGNOSIS — M54.30 SCIATICA, UNSPECIFIED LATERALITY: ICD-10-CM

## 2019-09-18 DIAGNOSIS — M54.12 CERVICAL RADICULOPATHY: ICD-10-CM

## 2019-09-18 DIAGNOSIS — M16.9 OSTEOARTHRITIS OF HIP, UNSPECIFIED LATERALITY, UNSPECIFIED OSTEOARTHRITIS TYPE: ICD-10-CM

## 2019-09-18 DIAGNOSIS — M48.07 LUMBOSACRAL STENOSIS: ICD-10-CM

## 2019-09-18 DIAGNOSIS — M54.9 BACKACHE WITH RADIATION: ICD-10-CM

## 2019-09-18 DIAGNOSIS — M47.16 OSTEOARTHRITIS OF SPINE WITH MYELOPATHY, LUMBOSACRAL REGION: ICD-10-CM

## 2019-09-18 DIAGNOSIS — E50.7 XEROPHTHALMIA: Primary | ICD-10-CM

## 2019-09-18 DIAGNOSIS — M81.0 OSTEOPOROSIS, UNSPECIFIED OSTEOPOROSIS TYPE, UNSPECIFIED PATHOLOGICAL FRACTURE PRESENCE: ICD-10-CM

## 2019-09-18 PROCEDURE — 99214 PR OFFICE/OUTPT VISIT, EST, LEVL IV, 30-39 MIN: ICD-10-PCS | Mod: S$GLB,,, | Performed by: INTERNAL MEDICINE

## 2019-09-18 PROCEDURE — 99214 OFFICE O/P EST MOD 30 MIN: CPT | Mod: S$GLB,,, | Performed by: INTERNAL MEDICINE

## 2019-09-18 RX ORDER — MELOXICAM 15 MG/1
15 TABLET ORAL DAILY
COMMUNITY
End: 2019-09-18 | Stop reason: SDUPTHER

## 2019-09-18 RX ORDER — MELOXICAM 15 MG/1
15 TABLET ORAL DAILY
Qty: 30 TABLET | Refills: 3 | Status: SHIPPED | OUTPATIENT
Start: 2019-09-18 | End: 2020-04-28

## 2019-09-18 NOTE — PROGRESS NOTES
Subjective:       Patient ID: Martha Caraballo is a 89 y.o. female.    Chief Complaint: Follow-up    HPI dryness in eyes on Rstasis with no dryness in mouth. Had  Pain in lower laef and has developed swelling with edema. Seen at ER [facility] for hip bursitis  Given corticosteroid injection . Hands hurt on and off helped by a short course of Prednisone.Continue on Gabapentin 300 mg BID and meloxicam  7.5 Bid . On calcium and vit D3. Pulmopnary nodule f/up with PCP.        Current medications include:  Current Outpatient Medications on File Prior to Visit   Medication Sig Dispense Refill    ALPRAZolam (XANAX) 0.25 MG tablet Take 1 tablet (0.25 mg total) by mouth 3 (three) times daily as needed for Anxiety. 30 tablet 2    amLODIPine (NORVASC) 5 MG tablet Take 5 mg by mouth every morning.  6    aspirin (ADULT LOW DOSE ASPIRIN) 81 MG EC tablet Adult Low Dose Aspirin   daily      atorvastatin (LIPITOR) 40 MG tablet Take 40 mg by mouth nightly.  3    clotrimazole-betamethasone 1-0.05% (LOTRISONE) cream Apply topically 2 (two) times daily. 45 Tube 0    cycloSPORINE (RESTASIS) 0.05 % ophthalmic emulsion Restasis 0.05 % eye drops in a dropperette      famotidine (PEPCID) 40 MG tablet TAKE 1 TABLET BY MOUTH EVERY DAY FOR STOMACH  6    ferrous fumarate-folic acid 324 mg, 106mg iron,-1mg (HEMOCYTE-F) Tab Hematinic/Folic Acid 324 mg (106 mg iron)-1 mg tablet   TAKE 1 TABLET BY MOUTH EVERY DAY      gabapentin (NEURONTIN) 300 MG capsule Take 1 capsule (300 mg total) by mouth 2 (two) times daily. 180 capsule 3    isosorbide mononitrate (IMDUR) 30 MG 24 hr tablet 1 tablet once daily.      metoprolol succinate (TOPROL-XL) 50 MG 24 hr tablet Take 50 mg by mouth once daily.  3    nitroGLYCERIN (NITROSTAT) 0.4 MG SL tablet Place 1 tablet (0.4 mg total) under the tongue every 5 (five) minutes as needed for Chest pain. 30 tablet 2    omega-3s/dha/epa/fish oil/D3 (VITAMIN-D + OMEGA-3 ORAL) Vitamin D   1200mg daily      predniSONE  "(DELTASONE) 10 MG tablet Take 1 tablet (10 mg total) by mouth once daily. 30 tablet 2    ranolazine (RANEXA) 500 MG Tb12 Take 500 mg by mouth 2 (two) times daily.      spironolactone (ALDACTONE) 25 MG tablet Take 1 tablet (25 mg total) by mouth once daily. 30 tablet 5    traMADol (ULTRAM) 50 mg tablet Take 1 tablet (50 mg total) by mouth 3 (three) times daily as needed for Pain. 90 tablet 2    traZODone (DESYREL) 50 MG tablet trazodone 50 mg tablet   Take 1 tablet once daily at bedtime      vitamin E 100 unit/0.25 mL Drop vitamin E   daily      VOLTAREN 1 % Gel Apply 2 g to affected area three times daily as needed 100 g 5    [DISCONTINUED] meloxicam (MOBIC) 15 MG tablet Take 15 mg by mouth once daily.      [DISCONTINUED] ciprofloxacin HCl (CIPRO) 500 MG tablet Take 1 tablet (500 mg total) by mouth every 12 (twelve) hours. 20 tablet 0     No current facility-administered medications on file prior to visit.        Lab Results:  Color, UA   Date Value Ref Range Status   07/26/2019 YELLOW  Final     Ketones, UA   Date Value Ref Range Status   07/26/2019 NEGATIVE NEGATIVE mg/dL Final        Review of Systems   Constitutional: Negative.    HENT:        Dry eyes and mouth on Restasis   Eyes: Negative.    Respiratory: Negative.    Cardiovascular: Positive for chest pain.        Angina on NTG.   Gastrointestinal:        Heartburns   Endocrine: Negative.    Genitourinary: Negative.    Musculoskeletal: Positive for arthralgias and back pain.   Allergic/Immunologic: Positive for environmental allergies.   Neurological: Negative.          Objective:   /60 (BP Location: Right arm, Patient Position: Sitting, BP Method: Medium (Manual))   Pulse 66   Temp 97 °F (36.1 °C) (Temporal)   Resp 16   Ht 5' 6" (1.676 m)   Wt 64.4 kg (142 lb)   BMI 22.92 kg/m²      Physical Exam   Constitutional: She is well-developed, well-nourished, and in no distress.   HENT:   Head: Normocephalic and atraumatic.   Dryness in eyes "   Eyes: Conjunctivae and EOM are normal. Pupils are equal, round, and reactive to light.   Neck: Normal range of motion. Neck supple.   Cardiovascular: Normal rate and regular rhythm.    Murmur heard.  Pulmonary/Chest: Effort normal and breath sounds normal.   Abdominal: Soft. Bowel sounds are normal.   Neurological:   SLR=negative   Skin: Skin is warm and dry.     Musculoskeletal:   No swelling or or tenderness in peripheral joint           Assessment:       1. Lumbosacral spondylolysis    2. Xerophthalmia    3. Backache with radiation    4. Osteoarthritis of hip, unspecified laterality, unspecified osteoarthritis type    5. Osteoporosis, unspecified osteoporosis type, unspecified pathological fracture presence    6. Sciatica, unspecified laterality    7. Trochanteric bursitis, unspecified laterality    8. Idiopathic PAH (pulmonary arterial hypertension)    9. Osteoarthritis of spine with myelopathy, lumbosacral region    10. Lumbosacral stenosis    11. Cervical radiculopathy            Plan:       Will continue current medications and dosis

## 2019-10-07 ENCOUNTER — OFFICE VISIT (OUTPATIENT)
Dept: FAMILY MEDICINE | Facility: CLINIC | Age: 84
End: 2019-10-07
Payer: MEDICARE

## 2019-10-07 VITALS
WEIGHT: 139.13 LBS | TEMPERATURE: 98 F | HEIGHT: 66 IN | BODY MASS INDEX: 22.36 KG/M2 | OXYGEN SATURATION: 100 % | SYSTOLIC BLOOD PRESSURE: 136 MMHG | HEART RATE: 72 BPM | DIASTOLIC BLOOD PRESSURE: 72 MMHG

## 2019-10-07 DIAGNOSIS — M47.16 OSTEOARTHRITIS OF SPINE WITH MYELOPATHY, LUMBOSACRAL REGION: ICD-10-CM

## 2019-10-07 DIAGNOSIS — I25.110 CORONARY ARTERY DISEASE INVOLVING NATIVE CORONARY ARTERY OF NATIVE HEART WITH UNSTABLE ANGINA PECTORIS: ICD-10-CM

## 2019-10-07 DIAGNOSIS — R26.81 UNSTEADY GAIT: ICD-10-CM

## 2019-10-07 DIAGNOSIS — I10 ESSENTIAL HYPERTENSION: ICD-10-CM

## 2019-10-07 PROCEDURE — 99214 OFFICE O/P EST MOD 30 MIN: CPT | Mod: S$GLB,,, | Performed by: FAMILY MEDICINE

## 2019-10-07 PROCEDURE — 99214 PR OFFICE/OUTPT VISIT, EST, LEVL IV, 30-39 MIN: ICD-10-PCS | Mod: S$GLB,,, | Performed by: FAMILY MEDICINE

## 2019-10-07 NOTE — PROGRESS NOTES
Subjective:       Patient ID: Martha Caraballo is a 89 y.o. female.    Chief Complaint: follow on hypertension and Arthritis    89 yo  Female in for follow up.  She states that she has been feeling a little better since she was started on isorsorbide nitrates by the cardiologist that she saw in [place].  She has been having chest pain intermittently and she nitroglycerin when needed.  She has pain medication to take when needed for her arthritis pain. She would like a referral to go to outpatient physical therapy for unsteady gait.  She uses a cane for balance but still feels off balance at times.    Review of Systems   Constitutional: Negative for fever.   HENT: Negative for ear pain, postnasal drip, rhinorrhea, sinus pain and sore throat.    Eyes: Negative for redness.   Respiratory: Negative for cough, chest tightness, shortness of breath and wheezing.    Cardiovascular: Positive for chest pain. Negative for palpitations and leg swelling.   Gastrointestinal: Negative for constipation, diarrhea, nausea and vomiting.   Genitourinary: Negative for difficulty urinating and dysuria.   Musculoskeletal: Positive for arthralgias, back pain, gait problem, myalgias and neck pain.   Skin: Negative for rash.   Neurological: Positive for weakness. Negative for dizziness.       Objective:     HEENT - wnl  CV - RRR   Lungs - CTA bilaterally  ABD - soft non tender nondistened NABS  Ext - no edema seen.  Neuro - gait unsteady even with cane      Assessment:       1. Essential hypertension    2. Coronary artery disease involving native coronary artery of native heart with unstable angina pectoris    3. Osteoarthritis of spine with myelopathy, lumbosacral region    4. Unsteady gait        Plan:     Hypertension - chronic and controlled on metoprolol.  CAD with angina - followed by cardiology and take the NTG when needed.  Continue the pain medication when needed for arthritis pain and sciatica.  Referral to outpatient therapy for  unsteady gait.

## 2019-12-16 ENCOUNTER — TELEPHONE (OUTPATIENT)
Dept: FAMILY MEDICINE | Facility: CLINIC | Age: 84
End: 2019-12-16

## 2019-12-16 DIAGNOSIS — M47.27 OSTEOARTHRITIS OF SPINE WITH RADICULOPATHY, LUMBOSACRAL REGION: ICD-10-CM

## 2019-12-16 DIAGNOSIS — M48.07 LUMBOSACRAL STENOSIS: Primary | ICD-10-CM

## 2019-12-16 NOTE — TELEPHONE ENCOUNTER
----- Message from Gia Hills sent at 12/16/2019 10:59 AM CST -----  Contact: [facility] [phone] fax#[phone]  Needs a new referral for PT

## 2019-12-17 LAB
ABS NRBC COUNT: 0 X 10 3/UL (ref 0–0.01)
ABSOLUTE BASOPHIL: 0.03 X 10 3/UL (ref 0–0.22)
ABSOLUTE EOSINOPHIL: 0.05 X 10 3/UL (ref 0.04–0.54)
ABSOLUTE IMMATURE GRAN: 0.01 X 10 3/UL (ref 0–0.04)
ABSOLUTE LYMPHOCYTE: 1.66 X 10 3/UL (ref 0.86–4.75)
ABSOLUTE MONOCYTE: 0.78 X 10 3/UL (ref 0.22–1.08)
ALBUMIN SERPL-MCNC: 4.2 G/DL (ref 3.5–5.2)
ALBUMIN/GLOB SERPL ELPH: 1.4 {RATIO} (ref 1–2.7)
ALP ISOS SERPL LEV INH-CCNC: 51 U/L (ref 35–105)
ALT (SGPT): <5 U/L (ref 0–33)
AMORPH URATE CRY URNS QL MICRO: NEGATIVE
ANION GAP SERPL CALC-SCNC: 14 MMOL/L (ref 8–17)
AST SERPL-CCNC: 16 U/L (ref 0–32)
BACTERIA #/AREA URNS HPF: ABNORMAL /[HPF]
BASOPHILS NFR BLD: 0.5 % (ref 0.2–1.2)
BILIRUB UR QL STRIP: NEGATIVE
BILIRUBIN, TOTAL: 0.23 MG/DL (ref 0–1.2)
BUN/CREAT SERPL: 11 (ref 6–20)
CALCIUM SERPL-MCNC: 9.5 MG/DL (ref 8.6–10.2)
CARBON DIOXIDE, CO2: 24 MMOL/L (ref 22–29)
CHLORIDE: 104 MMOL/L (ref 98–107)
CLARITY UR: CLEAR
COLOR UR: YELLOW
CREAT SERPL-MCNC: 0.87 MG/DL (ref 0.5–0.9)
EOSINOPHIL NFR BLD: 0.9 % (ref 0.7–7)
EPITHELIAL CELLS: ABNORMAL
GFR ESTIMATION: 61.31
GLOBULIN: 3 G/DL (ref 1.5–4.5)
GLUCOSE (UA): NEGATIVE MG/DL
GLUCOSE: 70 MG/DL (ref 82–115)
HCT VFR BLD AUTO: 35.2 % (ref 37–47)
HGB BLD-MCNC: 11.3 G/DL (ref 12–16)
IMMATURE GRANULOCYTES: 0.2 % (ref 0–0.5)
KETONES UR QL STRIP: NEGATIVE MG/DL
LEUKOCYTE ESTERASE UR QL STRIP: NEGATIVE
LYMPHOCYTES NFR BLD: 29.2 % (ref 19.3–53.1)
MCH RBC QN AUTO: 29 PG (ref 27–32)
MCHC RBC AUTO-ENTMCNC: 32.1 G/DL (ref 32–36)
MCV RBC AUTO: 90.5 FL (ref 82–100)
MONOCYTES NFR BLD: 13.7 % (ref 4.7–12.5)
MUCOUS THREADS URNS QL MICRO: NEGATIVE
NEUTROPHILS ABSOLUTE COUNT: 3.15 X 10 3/UL (ref 2.15–7.56)
NEUTROPHILS NFR BLD: 55.5 %
NITRITE UR QL STRIP: NEGATIVE
NUCLEATED RED BLOOD CELLS: 0 /100 WBC (ref 0–0.2)
OCCULT BLOOD: NEGATIVE
PH, URINE: 6.5 (ref 5–7.5)
PLATELET # BLD AUTO: 214 X 10 3/UL (ref 135–400)
POTASSIUM: 4.5 MMOL/L (ref 3.5–5.1)
PROT SNV-MCNC: 7.2 G/DL (ref 6.4–8.3)
PROT UR QL STRIP: NEGATIVE MG/DL
RBC # BLD AUTO: 3.89 X 10 6/UL (ref 4.2–5.4)
RBC/HPF: NEGATIVE
RDW-SD: 56.5 FL (ref 37–54)
SED RATE (WESTERGREN): 25 MM/HR (ref 0–30)
SODIUM: 142 MMOL/L (ref 136–145)
SP GR UR STRIP: 1.01 (ref 1–1.03)
UREA NITROGEN (BUN): 9.6 MG/DL (ref 8–23)
UROBILINOGEN, URINE: NORMAL E.U./DL (ref 0–1)
WBC # BLD: 5.68 X 10 3/UL (ref 4.3–10.8)
WBC/HPF: ABNORMAL

## 2019-12-18 ENCOUNTER — OFFICE VISIT (OUTPATIENT)
Dept: RHEUMATOLOGY | Facility: CLINIC | Age: 84
End: 2019-12-18
Payer: MEDICARE

## 2019-12-18 VITALS
RESPIRATION RATE: 16 BRPM | BODY MASS INDEX: 22.18 KG/M2 | TEMPERATURE: 98 F | HEIGHT: 66 IN | SYSTOLIC BLOOD PRESSURE: 120 MMHG | HEART RATE: 72 BPM | OXYGEN SATURATION: 99 % | DIASTOLIC BLOOD PRESSURE: 60 MMHG | WEIGHT: 138 LBS

## 2019-12-18 DIAGNOSIS — M47.16 OSTEOARTHRITIS OF SPINE WITH MYELOPATHY, LUMBOSACRAL REGION: ICD-10-CM

## 2019-12-18 DIAGNOSIS — I20.89 ANGINA AT REST: ICD-10-CM

## 2019-12-18 DIAGNOSIS — M81.0 OSTEOPOROSIS, UNSPECIFIED OSTEOPOROSIS TYPE, UNSPECIFIED PATHOLOGICAL FRACTURE PRESENCE: ICD-10-CM

## 2019-12-18 DIAGNOSIS — J44.9 CHRONIC OBSTRUCTIVE PULMONARY DISEASE, UNSPECIFIED COPD TYPE: ICD-10-CM

## 2019-12-18 DIAGNOSIS — M70.60 TROCHANTERIC BURSITIS, UNSPECIFIED LATERALITY: ICD-10-CM

## 2019-12-18 DIAGNOSIS — I27.0 IDIOPATHIC PAH (PULMONARY ARTERIAL HYPERTENSION): ICD-10-CM

## 2019-12-18 DIAGNOSIS — M94.0 COSTOCHONDRITIS: ICD-10-CM

## 2019-12-18 DIAGNOSIS — E50.7 XEROPHTHALMIA: ICD-10-CM

## 2019-12-18 DIAGNOSIS — M48.07 LUMBOSACRAL STENOSIS: Primary | ICD-10-CM

## 2019-12-18 PROCEDURE — 99214 PR OFFICE/OUTPT VISIT, EST, LEVL IV, 30-39 MIN: ICD-10-PCS | Mod: S$GLB,,, | Performed by: INTERNAL MEDICINE

## 2019-12-18 PROCEDURE — 99214 OFFICE O/P EST MOD 30 MIN: CPT | Mod: S$GLB,,, | Performed by: INTERNAL MEDICINE

## 2019-12-18 PROCEDURE — 1159F MED LIST DOCD IN RCRD: CPT | Mod: S$GLB,,, | Performed by: INTERNAL MEDICINE

## 2019-12-18 PROCEDURE — 1159F PR MEDICATION LIST DOCUMENTED IN MEDICAL RECORD: ICD-10-PCS | Mod: S$GLB,,, | Performed by: INTERNAL MEDICINE

## 2019-12-18 RX ORDER — HYDROCODONE BITARTRATE AND ACETAMINOPHEN 5; 325 MG/1; MG/1
1 TABLET ORAL 2 TIMES DAILY PRN
Qty: 60 TABLET | Refills: 0 | Status: SHIPPED | OUTPATIENT
Start: 2019-12-18 | End: 2020-03-18 | Stop reason: SDUPTHER

## 2019-12-18 RX ORDER — HYDROCODONE BITARTRATE AND ACETAMINOPHEN 5; 325 MG/1; MG/1
1 TABLET ORAL 2 TIMES DAILY PRN
COMMUNITY
End: 2019-12-18 | Stop reason: SDUPTHER

## 2019-12-18 NOTE — PROGRESS NOTES
Subjective:       Patient ID: Martha Caraballo is a 89 y.o. female.    Chief Complaint: Follow-up (with lab results)    HPIMostly pain inn hands and finger with polyarthralgia and has taken prednisone short courses. That helped her with musculoskeleatal complains. Legs hurt with no paresthesia. Has been on meloxicam 15 mg a day with fgi upset and at times  On no antiacids medications./ . Takes Gabapentin 300 mg BID. No doeand no leg edema. On calcium and vitamin D3.. Currently on PT for polyarthralgi a with hx of angina pectoris at rest.        Current medications include:  Current Outpatient Medications on File Prior to Visit   Medication Sig Dispense Refill    ALPRAZolam (XANAX) 0.25 MG tablet Take 1 tablet (0.25 mg total) by mouth 3 (three) times daily as needed for Anxiety. 30 tablet 2    amLODIPine (NORVASC) 5 MG tablet Take 5 mg by mouth every morning.  6    aspirin (ADULT LOW DOSE ASPIRIN) 81 MG EC tablet Adult Low Dose Aspirin   daily      atorvastatin (LIPITOR) 40 MG tablet Take 40 mg by mouth nightly.  3    clotrimazole-betamethasone 1-0.05% (LOTRISONE) cream Apply topically 2 (two) times daily. 45 Tube 0    cycloSPORINE (RESTASIS) 0.05 % ophthalmic emulsion Restasis 0.05 % eye drops in a dropperette      famotidine (PEPCID) 40 MG tablet TAKE 1 TABLET BY MOUTH EVERY DAY FOR STOMACH  6    ferrous fumarate-folic acid 324 mg, 106mg iron,-1mg (HEMOCYTE-F) Tab Hematinic/Folic Acid 324 mg (106 mg iron)-1 mg tablet   TAKE 1 TABLET BY MOUTH EVERY DAY      gabapentin (NEURONTIN) 300 MG capsule Take 1 capsule (300 mg total) by mouth 2 (two) times daily. 180 capsule 3    isosorbide mononitrate (IMDUR) 30 MG 24 hr tablet 1 tablet once daily.      meloxicam (MOBIC) 15 MG tablet Take 1 tablet (15 mg total) by mouth once daily. 30 tablet 3    metoprolol succinate (TOPROL-XL) 50 MG 24 hr tablet Take 50 mg by mouth once daily.  3    nitroGLYCERIN (NITROSTAT) 0.4 MG SL tablet Place 1 tablet (0.4 mg total) under  the tongue every 5 (five) minutes as needed for Chest pain. 30 tablet 2    omega-3s/dha/epa/fish oil/D3 (VITAMIN-D + OMEGA-3 ORAL) Vitamin D   1200mg daily      predniSONE (DELTASONE) 10 MG tablet Take 1 tablet (10 mg total) by mouth once daily. 30 tablet 2    ranolazine (RANEXA) 500 MG Tb12 Take 500 mg by mouth 2 (two) times daily.      spironolactone (ALDACTONE) 25 MG tablet Take 1 tablet (25 mg total) by mouth once daily. 30 tablet 5    traZODone (DESYREL) 50 MG tablet trazodone 50 mg tablet   Take 1 tablet once daily at bedtime      vitamin E 100 unit/0.25 mL Drop vitamin E   daily      VOLTAREN 1 % Gel Apply 2 g to affected area three times daily as needed 100 g 5    [DISCONTINUED] HYDROcodone-acetaminophen (NORCO) 5-325 mg per tablet Take 1 tablet by mouth 2 (two) times daily as needed for Pain.      [DISCONTINUED] traMADol (ULTRAM) 50 mg tablet Take 1 tablet (50 mg total) by mouth 3 (three) times daily as needed for Pain. 90 tablet 2     No current facility-administered medications on file prior to visit.        Lab Results:  WBC   Date Value Ref Range Status   12/17/2019 5.68 4.3 - 10.8 X 10 3/ul Final     Hemoglobin   Date Value Ref Range Status   12/17/2019 11.3 (L) 12 - 16 g/dL Final     Hematocrit   Date Value Ref Range Status   12/17/2019 35.2 (L) 37 - 47 % Final     Color, UA   Date Value Ref Range Status   12/17/2019 YELLOW  Final     Ketones, UA   Date Value Ref Range Status   12/17/2019 NEGATIVE NEGATIVE mg/dL Final     Sodium   Date Value Ref Range Status   12/17/2019 142 136 - 145 mmol/L Final     Potassium   Date Value Ref Range Status   12/17/2019 4.5 3.5 - 5.1 mmol/L Final     Chloride   Date Value Ref Range Status   12/17/2019 104 98 - 107 mmol/L Final     CO2   Date Value Ref Range Status   12/17/2019 24 22 - 29 mmol/L Final     BUN, Bld   Date Value Ref Range Status   12/17/2019 9.6 8 - 23 mg/dL Final     Creatinine   Date Value Ref Range Status   12/17/2019 0.87 0.50 - 0.90 mg/dL  "Final        Review of Systems   Constitutional: Positive for diaphoresis.   HENT:        Dry eyes on Restasis and sip more water for the dry mouth   Eyes: Negative.  Negative for discharge.   Respiratory: Negative.    Cardiovascular:        Angina pectoris    Gastrointestinal:        Had rectal bleedig 1 1/2 year at [facility].    Endocrine: Negative.    Genitourinary: Negative.    Musculoskeletal: Positive for arthralgias and back pain.   Allergic/Immunologic: Positive for environmental allergies.   Neurological: Negative.    Hematological:        Chronic anemia on Iron po.   Psychiatric/Behavioral: Negative.          Objective:   /60 (BP Location: Right arm, Patient Position: Sitting, BP Method: Medium (Manual))   Pulse 72   Temp 98.4 °F (36.9 °C) (Temporal)   Resp 16   Ht 5' 6" (1.676 m)   Wt 62.6 kg (138 lb)   SpO2 99%   BMI 22.27 kg/m²      Physical Exam   Constitutional:   slender   HENT:   Dryness in mouth   Eyes: Conjunctivae and EOM are normal. Pupils are equal, round, and reactive to light.   Neck: Normal range of motion. Neck supple.   Cardiovascular: Normal rate and regular rhythm.    Pulmonary/Chest: Effort normal and breath sounds normal.   Abdominal: Soft. Bowel sounds are normal.   Neurological:   SLR=positive bilaterally   Skin: Skin is warm and dry.     Psychiatric: Mood, memory, affect and judgment normal.   Musculoskeletal:   tenderness in  Shoulder neck area cervical and LS and SI..tenderness in fionger mcp and hip greater trochanter bursitis.           Assessment:       1. Lumbosacral stenosis    2. Osteoarthritis of spine with myelopathy, lumbosacral region    3. Xerophthalmia    4. Idiopathic PAH (pulmonary arterial hypertension)    5. Chronic obstructive pulmonary disease, unspecified COPD type    6. Angina at rest    7. Trochanteric bursitis, unspecified laterality    8. Osteoporosis, unspecified osteoporosis type, unspecified pathological fracture presence    9. " Costochondritis            Plan:        will continue on iron tbs for anemia and sme medications and dosis.take Latesha pentin 300  I1 in am and 2 caps HS.

## 2020-01-07 ENCOUNTER — OFFICE VISIT (OUTPATIENT)
Dept: FAMILY MEDICINE | Facility: CLINIC | Age: 85
End: 2020-01-07
Payer: MEDICARE

## 2020-01-07 VITALS
HEIGHT: 66 IN | DIASTOLIC BLOOD PRESSURE: 75 MMHG | OXYGEN SATURATION: 100 % | TEMPERATURE: 97 F | HEART RATE: 66 BPM | BODY MASS INDEX: 21.76 KG/M2 | SYSTOLIC BLOOD PRESSURE: 159 MMHG | WEIGHT: 135.38 LBS

## 2020-01-07 DIAGNOSIS — I10 ESSENTIAL HYPERTENSION: ICD-10-CM

## 2020-01-07 DIAGNOSIS — I25.10 CORONARY ARTERY DISEASE, ANGINA PRESENCE UNSPECIFIED, UNSPECIFIED VESSEL OR LESION TYPE, UNSPECIFIED WHETHER NATIVE OR TRANSPLANTED HEART: ICD-10-CM

## 2020-01-07 DIAGNOSIS — R22.43 LOCALIZED SWELLING, MASS, OR LUMP OF LOWER EXTREMITY, BILATERAL: ICD-10-CM

## 2020-01-07 DIAGNOSIS — K21.9 GASTROESOPHAGEAL REFLUX DISEASE, ESOPHAGITIS PRESENCE NOT SPECIFIED: ICD-10-CM

## 2020-01-07 PROCEDURE — 99214 OFFICE O/P EST MOD 30 MIN: CPT | Mod: S$GLB,,, | Performed by: FAMILY MEDICINE

## 2020-01-07 PROCEDURE — 1159F PR MEDICATION LIST DOCUMENTED IN MEDICAL RECORD: ICD-10-PCS | Mod: S$GLB,,, | Performed by: FAMILY MEDICINE

## 2020-01-07 PROCEDURE — 99214 PR OFFICE/OUTPT VISIT, EST, LEVL IV, 30-39 MIN: ICD-10-PCS | Mod: S$GLB,,, | Performed by: FAMILY MEDICINE

## 2020-01-07 PROCEDURE — 1159F MED LIST DOCD IN RCRD: CPT | Mod: S$GLB,,, | Performed by: FAMILY MEDICINE

## 2020-01-07 RX ORDER — OMEPRAZOLE 40 MG/1
40 CAPSULE, DELAYED RELEASE ORAL DAILY
Qty: 30 CAPSULE | Refills: 5 | Status: SHIPPED | OUTPATIENT
Start: 2020-01-07 | End: 2020-06-17

## 2020-01-07 RX ORDER — ISOSORBIDE MONONITRATE 30 MG/1
30 TABLET, EXTENDED RELEASE ORAL DAILY
Qty: 30 TABLET | Refills: 5 | Status: SHIPPED | OUTPATIENT
Start: 2020-01-07 | End: 2020-06-12 | Stop reason: SDUPTHER

## 2020-01-07 RX ORDER — RANOLAZINE 500 MG/1
500 TABLET, EXTENDED RELEASE ORAL 2 TIMES DAILY
Qty: 60 TABLET | Refills: 5 | Status: SHIPPED | OUTPATIENT
Start: 2020-01-07 | End: 2020-04-28

## 2020-01-07 NOTE — PROGRESS NOTES
Subjective:      Patient ID: Martha Caraballo is a 89 y.o. female.    Chief Complaint: Follow-up and Hypertension    89-year-old female in for follow-up.  Patient states she has been doing fairly well.  She is glad she was able to go to physical therapy because it has really been helping her with her balance.  She is concerned about some swelling that she is having to her inner thigh area.  She went to see her cardiologist on yesterday because she was concerned the swelling could be coming from a heart.  She does have an echocardiogram ordered and was prescribed hydrochlorothiazide to start taking.  She states the acid reflux disease is getting worse and not responding to the famotidine that she has been taking for it.  She needs a refill of Ranexa and Imdur that she takes for her heart.    Review of Systems   Constitutional: Negative for fever.   HENT: Negative for ear pain, postnasal drip, rhinorrhea, sinus pain and sore throat.    Eyes: Negative for redness.   Respiratory: Negative for cough, chest tightness, shortness of breath and wheezing.    Cardiovascular: Positive for leg swelling. Negative for chest pain and palpitations.   Gastrointestinal: Negative for constipation, diarrhea, nausea and vomiting.        Heartburn   Genitourinary: Negative for difficulty urinating and dysuria.   Musculoskeletal: Negative for arthralgias.   Skin: Negative for rash.   Neurological: Negative for dizziness.     Medication List with Changes/Refills   New Medications    OMEPRAZOLE (PRILOSEC) 40 MG CAPSULE    Take 1 capsule (40 mg total) by mouth once daily.   Current Medications    ALPRAZOLAM (XANAX) 0.25 MG TABLET    Take 1 tablet (0.25 mg total) by mouth 3 (three) times daily as needed for Anxiety.    AMLODIPINE (NORVASC) 5 MG TABLET    Take 5 mg by mouth every morning.    ASPIRIN (ADULT LOW DOSE ASPIRIN) 81 MG EC TABLET    Adult Low Dose Aspirin   daily    ATORVASTATIN (LIPITOR) 40 MG TABLET    Take 40 mg by mouth nightly.     CLOTRIMAZOLE-BETAMETHASONE 1-0.05% (LOTRISONE) CREAM    Apply topically 2 (two) times daily.    CYCLOSPORINE (RESTASIS) 0.05 % OPHTHALMIC EMULSION    Restasis 0.05 % eye drops in a dropperette    FERROUS FUMARATE-FOLIC ACID 324 MG, 106MG IRON,-1MG (HEMOCYTE-F) TAB    Hematinic/Folic Acid 324 mg (106 mg iron)-1 mg tablet   TAKE 1 TABLET BY MOUTH EVERY DAY    GABAPENTIN (NEURONTIN) 300 MG CAPSULE    Take 1 capsule (300 mg total) by mouth 2 (two) times daily.    HYDROCODONE-ACETAMINOPHEN (NORCO) 5-325 MG PER TABLET    Take 1 tablet by mouth 2 (two) times daily as needed for Pain. Greater than 7 day supply is medically necessary.    MELOXICAM (MOBIC) 15 MG TABLET    Take 1 tablet (15 mg total) by mouth once daily.    METOPROLOL SUCCINATE (TOPROL-XL) 50 MG 24 HR TABLET    Take 50 mg by mouth once daily.    NITROGLYCERIN (NITROSTAT) 0.4 MG SL TABLET    Place 1 tablet (0.4 mg total) under the tongue every 5 (five) minutes as needed for Chest pain.    OMEGA-3S/DHA/EPA/FISH OIL/D3 (VITAMIN-D + OMEGA-3 ORAL)    Vitamin D   1200mg daily    PREDNISONE (DELTASONE) 10 MG TABLET    Take 1 tablet (10 mg total) by mouth once daily.    SPIRONOLACTONE (ALDACTONE) 25 MG TABLET    Take 1 tablet (25 mg total) by mouth once daily.    TRAZODONE (DESYREL) 50 MG TABLET    trazodone 50 mg tablet   Take 1 tablet once daily at bedtime    VITAMIN E 100 UNIT/0.25 ML DROP    vitamin E   daily    VOLTAREN 1 % GEL    Apply 2 g to affected area three times daily as needed   Changed and/or Refilled Medications    Modified Medication Previous Medication    ISOSORBIDE MONONITRATE (IMDUR) 30 MG 24 HR TABLET isosorbide mononitrate (IMDUR) 30 MG 24 hr tablet       Take 1 tablet (30 mg total) by mouth once daily.    1 tablet once daily.    RANOLAZINE (RANEXA) 500 MG TB12 ranolazine (RANEXA) 500 MG Tb12       Take 1 tablet (500 mg total) by mouth 2 (two) times daily.    Take 500 mg by mouth 2 (two) times daily.   Discontinued Medications    FAMOTIDINE  "(PEPCID) 40 MG TABLET    TAKE 1 TABLET BY MOUTH EVERY DAY FOR STOMACH      Objective:   BP (!) 159/75 (BP Location: Left arm, Patient Position: Sitting, BP Method: Medium (Automatic))   Pulse 66   Temp 97.4 °F (36.3 °C)   Ht 5' 6" (1.676 m)   Wt 61.4 kg (135 lb 6 oz)   SpO2 100%   BMI 21.85 kg/m²    Estimated body mass index is 21.85 kg/m² as calculated from the following:    Height as of this encounter: 5' 6" (1.676 m).    Weight as of this encounter: 61.4 kg (135 lb 6 oz).   Physical Exam   Constitutional: She is oriented to person, place, and time. She appears well-developed and well-nourished.   HENT:   Head: Normocephalic and atraumatic.   Right Ear: Hearing and tympanic membrane normal.   Left Ear: Hearing and tympanic membrane normal.   Nose: Nose normal.   Mouth/Throat: Uvula is midline, oropharynx is clear and moist and mucous membranes are normal.   Eyes: Pupils are equal, round, and reactive to light. Conjunctivae and EOM are normal.   Neck: Normal range of motion. Neck supple.   Cardiovascular: Normal rate, regular rhythm and normal heart sounds.   Pulmonary/Chest: Breath sounds normal. She has no wheezes. She has no rales.   Abdominal: Soft. Bowel sounds are normal. She exhibits no distension and no mass. There is no tenderness. There is no guarding.   Musculoskeletal: Normal range of motion. She exhibits no edema or tenderness.        Right upper leg: She exhibits swelling.        Left upper leg: She exhibits swelling.   Neurological: She is alert and oriented to person, place, and time. No cranial nerve deficit.   Skin: Skin is warm and dry. No rash noted. No erythema.   Psychiatric: She has a normal mood and affect. Her speech is normal and behavior is normal. Judgment and thought content normal. Cognition and memory are normal.   Vitals reviewed.    Lab Results   Component Value Date    WBC 5.68 12/17/2019    HGB 11.3 (L) 12/17/2019    HCT 35.2 (L) 12/17/2019     12/17/2019    AST 16 " 12/17/2019     12/17/2019    K 4.5 12/17/2019     12/17/2019    CREATININE 0.87 12/17/2019    BUN 9.6 12/17/2019    CO2 24 12/17/2019      Assessment:      Problem List Items Addressed This Visit        Cardiac/Vascular    Coronary artery disease    Relevant Medications    ranolazine (RANEXA) 500 MG Tb12    isosorbide mononitrate (IMDUR) 30 MG 24 hr tablet    Essential hypertension       GI    Gastroesophageal reflux disease    Relevant Medications    omeprazole (PRILOSEC) 40 MG capsule       Other    Localized swelling, mass, or lump of lower extremity, bilateral           Plan:   Hypertension is chronic and well controlled on current medications.  Start Prilosec 40 mg capsule daily for gastroesophageal reflux disease.  She will stop the famotidine for now.  Refill Ranexa and Imdur for coronary artery disease.  I am not sure with the swelling of the inner thigh area that she complains of is coming from but it appears more like decreased muscle mass causing the skin to be more lose in that area rather than swelling to me.  I have encouraged her to continue her follow-up with the cardiologist just to ensure that her heart is okay.  She only has what she is calling swelling in the inner thigh area but her lower extremities and feet are normal.

## 2020-01-27 ENCOUNTER — TELEPHONE (OUTPATIENT)
Dept: FAMILY MEDICINE | Facility: CLINIC | Age: 85
End: 2020-01-27

## 2020-01-27 NOTE — TELEPHONE ENCOUNTER
----- Message from [place] sent at 2020 10:19 AM CST -----  Contact: Aleta Littlejohn   fax # [phone]  Patient is attending physical therapy there and it has  needs a new plan of care or referral .  over the weekend need ASAP there to be seen now

## 2020-02-02 DIAGNOSIS — I10 ESSENTIAL HYPERTENSION: ICD-10-CM

## 2020-02-03 RX ORDER — SPIRONOLACTONE 25 MG/1
TABLET ORAL
Qty: 90 TABLET | Refills: 1 | Status: SHIPPED | OUTPATIENT
Start: 2020-02-03 | End: 2020-06-12 | Stop reason: SDUPTHER

## 2020-03-18 ENCOUNTER — OFFICE VISIT (OUTPATIENT)
Dept: RHEUMATOLOGY | Facility: CLINIC | Age: 85
End: 2020-03-18
Payer: MEDICARE

## 2020-03-18 VITALS
WEIGHT: 133 LBS | RESPIRATION RATE: 16 BRPM | TEMPERATURE: 97 F | OXYGEN SATURATION: 99 % | HEART RATE: 75 BPM | BODY MASS INDEX: 21.38 KG/M2 | SYSTOLIC BLOOD PRESSURE: 140 MMHG | HEIGHT: 66 IN | DIASTOLIC BLOOD PRESSURE: 62 MMHG

## 2020-03-18 DIAGNOSIS — I25.10 CORONARY ARTERY DISEASE, ANGINA PRESENCE UNSPECIFIED, UNSPECIFIED VESSEL OR LESION TYPE, UNSPECIFIED WHETHER NATIVE OR TRANSPLANTED HEART: ICD-10-CM

## 2020-03-18 DIAGNOSIS — E11.42 DIABETIC POLYNEUROPATHY ASSOCIATED WITH TYPE 2 DIABETES MELLITUS: ICD-10-CM

## 2020-03-18 DIAGNOSIS — M48.07 LUMBOSACRAL STENOSIS: ICD-10-CM

## 2020-03-18 DIAGNOSIS — M54.12 CERVICAL RADICULOPATHY: ICD-10-CM

## 2020-03-18 DIAGNOSIS — D50.0 IRON DEFICIENCY ANEMIA DUE TO CHRONIC BLOOD LOSS: ICD-10-CM

## 2020-03-18 DIAGNOSIS — G56.00 CARPAL TUNNEL SYNDROME, UNSPECIFIED LATERALITY: ICD-10-CM

## 2020-03-18 DIAGNOSIS — Z79.899 HISTORY OF ONGOING TREATMENT WITH HIGH-RISK MEDICATION: ICD-10-CM

## 2020-03-18 DIAGNOSIS — R91.1 SOLITARY PULMONARY NODULE: Primary | ICD-10-CM

## 2020-03-18 DIAGNOSIS — I20.89 ANGINA AT REST: ICD-10-CM

## 2020-03-18 DIAGNOSIS — M94.0 COSTOCHONDRITIS: ICD-10-CM
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
## 2020-03-18 DIAGNOSIS — E50.7 XEROPHTHALMIA: ICD-10-CM

## 2020-03-18 DIAGNOSIS — M54.30 SCIATICA, UNSPECIFIED LATERALITY: ICD-10-CM

## 2020-03-18 DIAGNOSIS — M47.27 OSTEOARTHRITIS OF SPINE WITH RADICULOPATHY, LUMBOSACRAL REGION: ICD-10-CM

## 2020-03-18 DIAGNOSIS — M70.60 TROCHANTERIC BURSITIS, UNSPECIFIED LATERALITY: ICD-10-CM

## 2020-03-18 DIAGNOSIS — I27.0 IDIOPATHIC PAH (PULMONARY ARTERIAL HYPERTENSION): ICD-10-CM

## 2020-03-18 PROCEDURE — 99214 PR OFFICE/OUTPT VISIT, EST, LEVL IV, 30-39 MIN: ICD-10-PCS | Mod: S$GLB,,, | Performed by: INTERNAL MEDICINE

## 2020-03-18 PROCEDURE — 99214 OFFICE O/P EST MOD 30 MIN: CPT | Mod: S$GLB,,, | Performed by: INTERNAL MEDICINE

## 2020-03-18 RX ORDER — HYDROCODONE BITARTRATE AND ACETAMINOPHEN 5; 325 MG/1; MG/1
1 TABLET ORAL 2 TIMES DAILY PRN
Qty: 60 TABLET | Refills: 0 | Status: SHIPPED | OUTPATIENT
Start: 2020-03-18 | End: 2020-04-30 | Stop reason: SDUPTHER
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
## 2020-04-24 ENCOUNTER — TELEPHONE (OUTPATIENT)
Dept: INTERNAL MEDICINE | Facility: CLINIC | Age: 85
End: 2020-04-24

## 2020-04-24 NOTE — TELEPHONE ENCOUNTER
----- Message from Chio Meade sent at 4/24/2020  1:06 PM CDT -----  Contact: self  Requesting call back regarding getting re establish. Please call back at [phone].

## 2020-04-28 ENCOUNTER — OFFICE VISIT (OUTPATIENT)
Dept: INTERNAL MEDICINE | Facility: CLINIC | Age: 85
End: 2020-04-28
Payer: MEDICARE

## 2020-04-28 DIAGNOSIS — M47.16 OSTEOARTHRITIS OF SPINE WITH MYELOPATHY, LUMBOSACRAL REGION: Primary | ICD-10-CM

## 2020-04-28 DIAGNOSIS — Z23 NEED FOR SHINGLES VACCINE: ICD-10-CM

## 2020-04-28 DIAGNOSIS — I10 ESSENTIAL HYPERTENSION: ICD-10-CM

## 2020-04-28 DIAGNOSIS — R07.89 CHEST WALL PAIN: ICD-10-CM

## 2020-04-28 PROBLEM — M94.0 COSTOCHONDRITIS: Status: RESOLVED | Noted: 2019-05-28 | Resolved: 2020-04-28

## 2020-04-28 PROBLEM — M94.0 COSTAL CHONDRITIS: Status: RESOLVED | Noted: 2019-01-24 | Resolved: 2020-04-28

## 2020-04-28 PROCEDURE — 99441 PR PHYSICIAN TELEPHONE EVALUATION 5-10 MIN: ICD-10-PCS | Mod: 95,,, | Performed by: INTERNAL MEDICINE

## 2020-04-28 PROCEDURE — 99441 PR PHYSICIAN TELEPHONE EVALUATION 5-10 MIN: CPT | Mod: 95,,, | Performed by: INTERNAL MEDICINE
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
## 2020-04-30 RX ORDER — HYDROCODONE BITARTRATE AND ACETAMINOPHEN 5; 325 MG/1; MG/1
1 TABLET ORAL 2 TIMES DAILY PRN
Qty: 30 TABLET | Refills: 0 | Status: SHIPPED | OUTPATIENT
Start: 2020-04-30 | End: 2020-10-23 | Stop reason: SDUPTHER

## 2020-05-20 ENCOUNTER — OFFICE VISIT (OUTPATIENT)
Dept: INTERNAL MEDICINE | Facility: CLINIC | Age: 85
End: 2020-05-20
Payer: MEDICARE

## 2020-05-20 VITALS
HEIGHT: 66 IN | DIASTOLIC BLOOD PRESSURE: 70 MMHG | WEIGHT: 137 LBS | SYSTOLIC BLOOD PRESSURE: 155 MMHG | BODY MASS INDEX: 22.02 KG/M2 | TEMPERATURE: 98 F | HEART RATE: 79 BPM | OXYGEN SATURATION: 99 %

## 2020-05-20 DIAGNOSIS — I10 ESSENTIAL HYPERTENSION: ICD-10-CM

## 2020-05-20 DIAGNOSIS — I25.10 CORONARY ARTERY DISEASE, ANGINA PRESENCE UNSPECIFIED, UNSPECIFIED VESSEL OR LESION TYPE, UNSPECIFIED WHETHER NATIVE OR TRANSPLANTED HEART: Primary | ICD-10-CM

## 2020-05-20 DIAGNOSIS — R10.13 EPIGASTRIC PAIN: ICD-10-CM

## 2020-05-20 PROBLEM — M47.812 CERVICAL SPONDYLOSIS: Status: ACTIVE | Noted: 2019-03-15

## 2020-05-20 PROBLEM — M54.17 LUMBOSACRAL RADICULOPATHY: Status: ACTIVE | Noted: 2019-03-15

## 2020-05-20 PROBLEM — G45.9 TRANSIENT ISCHEMIC ATTACK: Status: ACTIVE | Noted: 2020-05-20

## 2020-05-20 PROCEDURE — 99214 OFFICE O/P EST MOD 30 MIN: CPT | Mod: S$GLB,,, | Performed by: INTERNAL MEDICINE

## 2020-05-20 PROCEDURE — 99214 PR OFFICE/OUTPT VISIT, EST, LEVL IV, 30-39 MIN: ICD-10-PCS | Mod: S$GLB,,, | Performed by: INTERNAL MEDICINE

## 2020-05-20 RX ORDER — PREDNISONE 10 MG/1
TABLET ORAL
COMMUNITY
Start: 2020-04-30 | End: 2020-07-28 | Stop reason: SDUPTHER

## 2020-05-20 RX ORDER — IPRATROPIUM BROMIDE 42 UG/1
SPRAY, METERED NASAL
COMMUNITY
Start: 2020-05-14 | End: 2022-03-17

## 2020-05-20 RX ORDER — HYDROCHLOROTHIAZIDE 25 MG/1
TABLET ORAL
COMMUNITY
Start: 2020-04-09 | End: 2020-12-14
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
## 2020-06-02 ENCOUNTER — PATIENT OUTREACH (OUTPATIENT)
Dept: ADMINISTRATIVE | Facility: HOSPITAL | Age: 85
End: 2020-06-02

## 2020-06-11 ENCOUNTER — PATIENT OUTREACH (OUTPATIENT)
Dept: ADMINISTRATIVE | Facility: HOSPITAL | Age: 85
End: 2020-06-11

## 2020-06-12 DIAGNOSIS — I25.10 CORONARY ARTERY DISEASE, ANGINA PRESENCE UNSPECIFIED, UNSPECIFIED VESSEL OR LESION TYPE, UNSPECIFIED WHETHER NATIVE OR TRANSPLANTED HEART: ICD-10-CM

## 2020-06-12 DIAGNOSIS — I10 ESSENTIAL HYPERTENSION: ICD-10-CM

## 2020-06-12 RX ORDER — PANTOPRAZOLE SODIUM 40 MG/1
TABLET, DELAYED RELEASE ORAL
COMMUNITY
Start: 2020-05-20 | End: 2020-06-12 | Stop reason: SDUPTHER

## 2020-06-12 NOTE — TELEPHONE ENCOUNTER
----- Message from Mitchell Stinson sent at 6/11/2020  2:52 PM CDT -----  Contact: Pt  Please call [place] to discuss a medication question she has [phone] (home).

## 2020-06-17 RX ORDER — ISOSORBIDE MONONITRATE 30 MG/1
30 TABLET, EXTENDED RELEASE ORAL DAILY
Qty: 30 TABLET | Refills: 5 | Status: SHIPPED | OUTPATIENT
Start: 2020-06-17 | End: 2021-01-04

## 2020-06-17 RX ORDER — SPIRONOLACTONE 25 MG/1
25 TABLET ORAL DAILY
Qty: 90 TABLET | Refills: 1 | Status: SHIPPED | OUTPATIENT
Start: 2020-06-17 | End: 2021-02-24

## 2020-06-17 RX ORDER — PANTOPRAZOLE SODIUM 40 MG/1
40 TABLET, DELAYED RELEASE ORAL DAILY
Qty: 30 TABLET | Refills: 0 | Status: SHIPPED | OUTPATIENT
Start: 2020-06-17 | End: 2020-07-17

## 2020-06-17 RX ORDER — METOPROLOL SUCCINATE 50 MG/1
50 TABLET, EXTENDED RELEASE ORAL DAILY
Qty: 30 TABLET | Refills: 3 | Status: SHIPPED | OUTPATIENT
Start: 2020-06-17 | End: 2021-05-27

## 2020-07-06 RX ORDER — AMLODIPINE BESYLATE 5 MG/1
TABLET ORAL
Qty: 90 TABLET | Refills: 1 | Status: SHIPPED | OUTPATIENT
Start: 2020-07-06 | End: 2020-07-29

## 2020-07-06 NOTE — TELEPHONE ENCOUNTER
----- Message from Mitchell Stinson sent at 7/2/2020  2:22 PM CDT -----  Regarding: Refill  Type:  RX Refill Request    Who Called: Martha  Refill or New Rx:Refill  RX Name and Strength:amLODIPine (NORVASC) 5 MG tablet/isosorbide mononitrate (IMDUR) 30 MG 24 hr tablet/sucralfate 1gm   How is the patient currently taking it? (ex. 1XDay):  Is this a 30 day or 90 day RX:30  Preferred Pharmacy with phone number:  [facility]/pharm[facility] #[id number] - [place] - [address]  [address] [place] [address]  Phone: [phone] Fax: [phone]  Local or Mail Order:Local  Ordering Provider:Dr. Sanchez  Would the patient rather a call back or a response via [facility]? Call back  Best Call Back Number:[phone] (home)   Additional Information: na

## 2020-07-15 ENCOUNTER — TELEPHONE (OUTPATIENT)
Dept: INTERNAL MEDICINE | Facility: CLINIC | Age: 85
End: 2020-07-15

## 2020-07-15 NOTE — TELEPHONE ENCOUNTER
She says that she is anemic and has high blood pressure. She takes 5mg in the morning and 50 at night, she skipped out on bp medication at night and feels a little better but she is very weak, and hands are very pale.  Her bp cuff isn't working so she hasn't been able to check it and doesn't know why she feels bad. She would like to be seen    Please advise

## 2020-07-15 NOTE — TELEPHONE ENCOUNTER
----- Message from Clara Cornell sent at 7/15/2020 11:24 AM CDT -----  .Type:  Needs Medical Advice    Who Called:  Patient   Symptoms (please be specific): weak / pale skin    How long has patient had these symptoms:    Pharmacy name and phone #:    [id number] - [place] - [address]  [address] [place] [address]  Phone: [phone] Fax: [phone]          Would the patient rather a call back or a response via [facility]?  Call   Best Call Back Number:  [phone]  Additional Information: n/a

## 2020-07-20 DIAGNOSIS — M54.30 SCIATICA, UNSPECIFIED LATERALITY: ICD-10-CM

## 2020-07-21 RX ORDER — GABAPENTIN 300 MG/1
300 CAPSULE ORAL 3 TIMES DAILY
Qty: 90 CAPSULE | Refills: 1 | Status: SHIPPED | OUTPATIENT
Start: 2020-07-21 | End: 2020-10-23 | Stop reason: SDUPTHER

## 2020-07-28 ENCOUNTER — OFFICE VISIT (OUTPATIENT)
Dept: INTERNAL MEDICINE | Facility: CLINIC | Age: 85
End: 2020-07-28
Payer: MEDICARE

## 2020-07-28 VITALS
DIASTOLIC BLOOD PRESSURE: 75 MMHG | HEIGHT: 66 IN | BODY MASS INDEX: 22.34 KG/M2 | WEIGHT: 139 LBS | HEART RATE: 83 BPM | SYSTOLIC BLOOD PRESSURE: 132 MMHG | OXYGEN SATURATION: 96 % | TEMPERATURE: 98 F

## 2020-07-28 DIAGNOSIS — R07.89 COSTOCHONDRAL CHEST PAIN: Primary | ICD-10-CM

## 2020-07-28 DIAGNOSIS — I25.118 CORONARY ARTERY DISEASE OF NATIVE ARTERY OF NATIVE HEART WITH STABLE ANGINA PECTORIS: ICD-10-CM

## 2020-07-28 DIAGNOSIS — K21.9 GASTROESOPHAGEAL REFLUX DISEASE, ESOPHAGITIS PRESENCE NOT SPECIFIED: ICD-10-CM

## 2020-07-28 PROCEDURE — 99213 OFFICE O/P EST LOW 20 MIN: CPT | Mod: S$GLB,,, | Performed by: NURSE PRACTITIONER

## 2020-07-28 PROCEDURE — 99213 PR OFFICE/OUTPT VISIT, EST, LEVL III, 20-29 MIN: ICD-10-PCS | Mod: S$GLB,,, | Performed by: NURSE PRACTITIONER

## 2020-07-28 RX ORDER — SUCRALFATE 1 G/1
TABLET ORAL
COMMUNITY
Start: 2020-05-20 | End: 2020-07-28 | Stop reason: SDUPTHER

## 2020-07-28 RX ORDER — PREDNISONE 10 MG/1
TABLET ORAL
Qty: 15 TABLET | Refills: 0 | Status: SHIPPED | OUTPATIENT
Start: 2020-07-28 | End: 2020-12-14

## 2020-07-28 RX ORDER — SUCRALFATE 1 G/1
1 TABLET ORAL
Qty: 120 TABLET | Refills: 1 | Status: SHIPPED | OUTPATIENT
Start: 2020-07-28 | End: 2020-08-24

## 2020-07-28 RX ORDER — NAPROXEN 500 MG/1
500 TABLET ORAL DAILY PRN
Qty: 20 TABLET | Refills: 0 | Status: SHIPPED | OUTPATIENT
Start: 2020-07-28 | End: 2020-10-23

## 2020-07-28 RX ORDER — NAPROXEN 500 MG/1
TABLET ORAL
COMMUNITY
Start: 2020-07-21 | End: 2020-07-28 | Stop reason: SDUPTHER

## 2020-07-28 RX ORDER — OMEPRAZOLE 40 MG/1
40 CAPSULE, DELAYED RELEASE ORAL EVERY MORNING
COMMUNITY
End: 2020-10-23

## 2020-08-21 DIAGNOSIS — K21.9 GASTROESOPHAGEAL REFLUX DISEASE, ESOPHAGITIS PRESENCE NOT SPECIFIED: ICD-10-CM

## 2020-08-24 RX ORDER — SUCRALFATE 1 G/1
1 TABLET ORAL
Qty: 120 TABLET | Refills: 1 | Status: SHIPPED | OUTPATIENT
Start: 2020-08-24 | End: 2020-09-12

## 2020-09-10 DIAGNOSIS — K21.9 GASTROESOPHAGEAL REFLUX DISEASE, ESOPHAGITIS PRESENCE NOT SPECIFIED: ICD-10-CM

## 2020-09-12 RX ORDER — SUCRALFATE 1 G/1
1 TABLET ORAL
Qty: 360 TABLET | Refills: 1 | Status: SHIPPED | OUTPATIENT
Start: 2020-09-12 | End: 2020-10-23

## 2020-10-23 ENCOUNTER — OFFICE VISIT (OUTPATIENT)
Dept: PRIMARY CARE CLINIC | Facility: CLINIC | Age: 85
End: 2020-10-23
Payer: MEDICARE

## 2020-10-23 VITALS
OXYGEN SATURATION: 99 % | TEMPERATURE: 98 F | HEART RATE: 78 BPM | SYSTOLIC BLOOD PRESSURE: 160 MMHG | BODY MASS INDEX: 22.02 KG/M2 | WEIGHT: 137 LBS | HEIGHT: 66 IN | DIASTOLIC BLOOD PRESSURE: 85 MMHG

## 2020-10-23 DIAGNOSIS — I10 ESSENTIAL HYPERTENSION: ICD-10-CM

## 2020-10-23 DIAGNOSIS — M54.30 SCIATICA, UNSPECIFIED LATERALITY: ICD-10-CM

## 2020-10-23 DIAGNOSIS — M47.16 OSTEOARTHRITIS OF SPINE WITH MYELOPATHY, LUMBOSACRAL REGION: ICD-10-CM

## 2020-10-23 DIAGNOSIS — M54.2 NECK PAIN: ICD-10-CM

## 2020-10-23 DIAGNOSIS — I25.10 CORONARY ARTERY DISEASE, ANGINA PRESENCE UNSPECIFIED, UNSPECIFIED VESSEL OR LESION TYPE, UNSPECIFIED WHETHER NATIVE OR TRANSPLANTED HEART: Primary | ICD-10-CM

## 2020-10-23 PROCEDURE — 99214 PR OFFICE/OUTPT VISIT, EST, LEVL IV, 30-39 MIN: ICD-10-PCS | Mod: S$GLB,,, | Performed by: INTERNAL MEDICINE

## 2020-10-23 PROCEDURE — 99214 OFFICE O/P EST MOD 30 MIN: CPT | Mod: S$GLB,,, | Performed by: INTERNAL MEDICINE

## 2020-10-23 RX ORDER — DICLOFENAC SODIUM 10 MG/G
2 GEL TOPICAL 3 TIMES DAILY
Qty: 100 G | Refills: 1 | Status: SHIPPED | OUTPATIENT
Start: 2020-10-23 | End: 2021-12-03

## 2020-10-23 RX ORDER — LOSARTAN POTASSIUM 50 MG/1
50 TABLET ORAL DAILY
Qty: 90 TABLET | Refills: 3 | Status: SHIPPED | OUTPATIENT
Start: 2020-10-23 | End: 2021-04-22

## 2020-10-23 RX ORDER — HYDROCODONE BITARTRATE AND ACETAMINOPHEN 5; 325 MG/1; MG/1
1 TABLET ORAL 2 TIMES DAILY PRN
Qty: 30 TABLET | Refills: 0 | Status: SHIPPED | OUTPATIENT
Start: 2020-10-23 | End: 2021-06-18 | Stop reason: SDUPTHER

## 2020-10-23 RX ORDER — GABAPENTIN 300 MG/1
300 CAPSULE ORAL 3 TIMES DAILY
Qty: 90 CAPSULE | Refills: 1 | Status: SHIPPED | OUTPATIENT
Start: 2020-10-23 | End: 2020-11-09 | Stop reason: SDUPTHER
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
## 2020-11-09 DIAGNOSIS — M54.30 SCIATICA, UNSPECIFIED LATERALITY: ICD-10-CM

## 2020-11-09 DIAGNOSIS — D64.9 ANEMIA, UNSPECIFIED TYPE: Primary | ICD-10-CM

## 2020-11-09 LAB
%TRANSFERRIN SATURATION: 21.6 % (ref 20–50)
ABS NRBC COUNT: 0 X 10 3/UL (ref 0–0.01)
ABSOLUTE BASOPHIL: 0.05 X 10 3/UL (ref 0–0.22)
ABSOLUTE EOSINOPHIL: 0.07 X 10 3/UL (ref 0.04–0.54)
ABSOLUTE IMMATURE GRAN: 0.02 X 10 3/UL (ref 0–0.04)
ABSOLUTE LYMPHOCYTE: 1.4 X 10 3/UL (ref 0.86–4.75)
ABSOLUTE MONOCYTE: 0.51 X 10 3/UL (ref 0.22–1.08)
ALBUMIN SERPL-MCNC: 4.1 G/DL (ref 3.5–5.2)
ALBUMIN/GLOB SERPL ELPH: 1.6 {RATIO} (ref 1–2.7)
ALP ISOS SERPL LEV INH-CCNC: 51 U/L (ref 35–105)
ALT (SGPT): <5 U/L (ref 0–33)
ANION GAP SERPL CALC-SCNC: 9 MMOL/L (ref 8–17)
AST SERPL-CCNC: 21 U/L (ref 0–32)
BASOPHILS NFR BLD: 0.9 % (ref 0.2–1.2)
BILIRUBIN, TOTAL: 0.27 MG/DL (ref 0–1.2)
BUN/CREAT SERPL: 14.1 (ref 6–20)
CALCIUM SERPL-MCNC: 9.3 MG/DL (ref 8.6–10.2)
CARBON DIOXIDE, CO2: 24 MMOL/L (ref 22–29)
CHLORIDE: 108 MMOL/L (ref 98–107)
CHOLEST SERPL-MSCNC: 159 MG/DL (ref 100–200)
CREAT SERPL-MCNC: 0.83 MG/DL (ref 0.5–0.9)
EOSINOPHIL NFR BLD: 1.3 % (ref 0.7–7)
FERRITIN: 55.9 NG/ML (ref 20–288)
GFR ESTIMATION: 64.58
GLOBULIN: 2.5 G/DL (ref 1.5–4.5)
GLUCOSE: 88 MG/DL (ref 82–115)
HCT VFR BLD AUTO: 33.8 % (ref 37–47)
HDLC SERPL-MCNC: 78 MG/DL
HGB BLD-MCNC: 10.8 G/DL (ref 12–16)
IMMATURE GRANULOCYTES: 0.4 % (ref 0–0.5)
IRON BINDING CAPACITY: 305 UG/DL (ref 262–472)
IRON SERPL-MCNC: 66 UG/DL (ref 37–145)
LDL/HDL RATIO: 0.9 (ref 1–3)
LDLC SERPL CALC-MCNC: 66.8 MG/DL (ref 0–100)
LYMPHOCYTES NFR BLD: 25.5 % (ref 19.3–53.1)
MCH RBC QN AUTO: 29.1 PG (ref 27–32)
MCHC RBC AUTO-ENTMCNC: 32 G/DL (ref 32–36)
MCV RBC AUTO: 91.1 FL (ref 82–100)
MONOCYTES NFR BLD: 9.3 % (ref 4.7–12.5)
NEUTROPHILS # BLD AUTO: 3.45 X 10 3/UL (ref 2.15–7.56)
NEUTROPHILS NFR BLD: 62.6 %
NUCLEATED RED BLOOD CELLS: 0 /100 WBC (ref 0–0.2)
PLATELET # BLD AUTO: 209 X 10 3/UL (ref 135–400)
POTASSIUM: 4.7 MMOL/L (ref 3.5–5.1)
PROT SNV-MCNC: 6.6 G/DL (ref 6.4–8.3)
RBC # BLD AUTO: 3.71 X 10 6/UL (ref 4.2–5.4)
RDW-SD: 53.8 FL (ref 37–54)
SODIUM: 141 MMOL/L (ref 136–145)
TRIGL SERPL-MCNC: 71 MG/DL (ref 0–150)
TSH SERPL DL<=0.005 MIU/L-ACNC: 1.15 UIU/ML (ref 0.27–4.2)
UIBC SERPL-MCNC: 239 UG/DL (ref 112–306)
UREA NITROGEN (BUN): 11.7 MG/DL (ref 8–23)
WBC # BLD: 5.5 X 10 3/UL (ref 4.3–10.8)

## 2020-11-09 RX ORDER — GABAPENTIN 300 MG/1
300 CAPSULE ORAL 3 TIMES DAILY
Qty: 90 CAPSULE | Refills: 1 | Status: SHIPPED | OUTPATIENT
Start: 2020-11-09 | End: 2021-04-07

## 2020-11-09 NOTE — TELEPHONE ENCOUNTER
Pt stated shes anemic but havent taken the iron tabs because shes been out of them for about 2 months...

## 2020-12-14 ENCOUNTER — OFFICE VISIT (OUTPATIENT)
Dept: PRIMARY CARE CLINIC | Facility: CLINIC | Age: 85
End: 2020-12-14
Payer: MEDICARE

## 2020-12-14 VITALS
HEIGHT: 66 IN | WEIGHT: 148 LBS | HEART RATE: 77 BPM | SYSTOLIC BLOOD PRESSURE: 172 MMHG | OXYGEN SATURATION: 100 % | DIASTOLIC BLOOD PRESSURE: 80 MMHG | BODY MASS INDEX: 23.78 KG/M2 | RESPIRATION RATE: 16 BRPM | TEMPERATURE: 97 F

## 2020-12-14 DIAGNOSIS — I25.10 CORONARY ARTERY DISEASE, ANGINA PRESENCE UNSPECIFIED, UNSPECIFIED VESSEL OR LESION TYPE, UNSPECIFIED WHETHER NATIVE OR TRANSPLANTED HEART: ICD-10-CM

## 2020-12-14 DIAGNOSIS — K92.2 ACUTE UPPER GI BLEED: Primary | ICD-10-CM

## 2020-12-14 DIAGNOSIS — R09.81 NASAL CONGESTION: ICD-10-CM

## 2020-12-14 PROCEDURE — 99214 PR OFFICE/OUTPT VISIT, EST, LEVL IV, 30-39 MIN: ICD-10-PCS | Mod: S$GLB,,, | Performed by: INTERNAL MEDICINE

## 2020-12-14 PROCEDURE — 99214 OFFICE O/P EST MOD 30 MIN: CPT | Mod: S$GLB,,, | Performed by: INTERNAL MEDICINE

## 2020-12-14 RX ORDER — PANTOPRAZOLE SODIUM 40 MG/1
40 TABLET, DELAYED RELEASE ORAL DAILY
Qty: 30 TABLET | Refills: 11 | Status: SHIPPED | OUTPATIENT
Start: 2020-12-14 | End: 2021-06-18

## 2020-12-14 RX ORDER — FLUTICASONE PROPIONATE 50 MCG
1 SPRAY, SUSPENSION (ML) NASAL DAILY
Qty: 16 G | Refills: 2 | Status: SHIPPED | OUTPATIENT
Start: 2020-12-14 | End: 2021-03-11
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
## 2021-06-18 ENCOUNTER — OFFICE VISIT (OUTPATIENT)
Dept: PRIMARY CARE CLINIC | Facility: CLINIC | Age: 86
End: 2021-06-18
Payer: MEDICARE

## 2021-06-18 VITALS
BODY MASS INDEX: 22.02 KG/M2 | HEART RATE: 64 BPM | RESPIRATION RATE: 16 BRPM | SYSTOLIC BLOOD PRESSURE: 158 MMHG | WEIGHT: 137 LBS | TEMPERATURE: 97 F | OXYGEN SATURATION: 98 % | HEIGHT: 66 IN | DIASTOLIC BLOOD PRESSURE: 80 MMHG

## 2021-06-18 DIAGNOSIS — I25.10 CORONARY ARTERY DISEASE, ANGINA PRESENCE UNSPECIFIED, UNSPECIFIED VESSEL OR LESION TYPE, UNSPECIFIED WHETHER NATIVE OR TRANSPLANTED HEART: Primary | ICD-10-CM

## 2021-06-18 DIAGNOSIS — M47.16 OSTEOARTHRITIS OF SPINE WITH MYELOPATHY, LUMBOSACRAL REGION: ICD-10-CM

## 2021-06-18 DIAGNOSIS — Z23 NEED FOR SHINGLES VACCINE: ICD-10-CM

## 2021-06-18 DIAGNOSIS — Z23 NEED FOR 23-POLYVALENT PNEUMOCOCCAL POLYSACCHARIDE VACCINE: ICD-10-CM

## 2021-06-18 PROCEDURE — 99215 PR OFFICE/OUTPT VISIT, EST, LEVL V, 40-54 MIN: ICD-10-PCS | Mod: S$GLB,,, | Performed by: INTERNAL MEDICINE

## 2021-06-18 PROCEDURE — 99215 OFFICE O/P EST HI 40 MIN: CPT | Mod: S$GLB,,, | Performed by: INTERNAL MEDICINE

## 2021-06-18 RX ORDER — ISOSORBIDE MONONITRATE 60 MG/1
60 TABLET, EXTENDED RELEASE ORAL DAILY
Qty: 30 TABLET | Refills: 11 | Status: SHIPPED | OUTPATIENT
Start: 2021-06-18 | End: 2022-05-27

## 2021-06-18 RX ORDER — FAMOTIDINE 40 MG/1
1 TABLET, FILM COATED ORAL DAILY
COMMUNITY
Start: 2021-03-30 | End: 2023-01-03

## 2021-06-18 RX ORDER — CYCLOBENZAPRINE HCL 10 MG
10 TABLET ORAL 3 TIMES DAILY PRN
COMMUNITY
End: 2021-06-18

## 2021-06-18 RX ORDER — CYCLOBENZAPRINE HCL 10 MG
10 TABLET ORAL 3 TIMES DAILY PRN
Status: CANCELLED | OUTPATIENT
Start: 2021-06-18

## 2021-06-18 RX ORDER — ZOSTER VACCINE RECOMBINANT, ADJUVANTED 50 MCG/0.5
KIT INTRAMUSCULAR
Qty: 1 EACH | Refills: 1 | Status: SHIPPED | OUTPATIENT
Start: 2021-06-18 | End: 2022-03-17

## 2021-06-18 RX ORDER — HYDROCODONE BITARTRATE AND ACETAMINOPHEN 5; 325 MG/1; MG/1
1 TABLET ORAL 2 TIMES DAILY PRN
Qty: 30 TABLET | Refills: 0 | Status: SHIPPED | OUTPATIENT
Start: 2021-06-18 | End: 2021-11-02 | Stop reason: SDUPTHER

## 2021-06-18 RX ORDER — ASPIRIN 81 MG/1
81 TABLET ORAL DAILY
COMMUNITY

## 2021-06-18 RX ORDER — CARVEDILOL 6.25 MG/1
6.25 TABLET ORAL 2 TIMES DAILY
COMMUNITY
Start: 2021-06-02 | End: 2021-07-30

## 2021-06-22 ENCOUNTER — TELEPHONE (OUTPATIENT)
Dept: PRIMARY CARE CLINIC | Facility: CLINIC | Age: 86
End: 2021-06-22

## 2021-06-23 DIAGNOSIS — I25.10 CORONARY ARTERY DISEASE, ANGINA PRESENCE UNSPECIFIED, UNSPECIFIED VESSEL OR LESION TYPE, UNSPECIFIED WHETHER NATIVE OR TRANSPLANTED HEART: Primary | ICD-10-CM

## 2021-07-08 DIAGNOSIS — M54.30 SCIATICA, UNSPECIFIED LATERALITY: ICD-10-CM

## 2021-07-08 RX ORDER — GABAPENTIN 300 MG/1
CAPSULE ORAL
Qty: 90 CAPSULE | Refills: 1 | Status: SHIPPED | OUTPATIENT
Start: 2021-07-08 | End: 2021-07-08 | Stop reason: SDUPTHER

## 2021-07-09 RX ORDER — GABAPENTIN 300 MG/1
300 CAPSULE ORAL 3 TIMES DAILY
Qty: 90 CAPSULE | Refills: 1 | Status: SHIPPED | OUTPATIENT
Start: 2021-07-09 | End: 2021-11-04

## 2021-07-30 ENCOUNTER — OFFICE VISIT (OUTPATIENT)
Dept: PRIMARY CARE CLINIC | Facility: CLINIC | Age: 86
End: 2021-07-30
Payer: MEDICARE

## 2021-07-30 VITALS
SYSTOLIC BLOOD PRESSURE: 192 MMHG | RESPIRATION RATE: 16 BRPM | TEMPERATURE: 98 F | DIASTOLIC BLOOD PRESSURE: 88 MMHG | HEART RATE: 65 BPM | OXYGEN SATURATION: 98 % | WEIGHT: 138 LBS | BODY MASS INDEX: 22.18 KG/M2 | HEIGHT: 66 IN

## 2021-07-30 DIAGNOSIS — R09.81 NASAL CONGESTION: ICD-10-CM

## 2021-07-30 DIAGNOSIS — I25.10 CORONARY ARTERY DISEASE, ANGINA PRESENCE UNSPECIFIED, UNSPECIFIED VESSEL OR LESION TYPE, UNSPECIFIED WHETHER NATIVE OR TRANSPLANTED HEART: Primary | ICD-10-CM

## 2021-07-30 DIAGNOSIS — I10 ESSENTIAL HYPERTENSION: ICD-10-CM

## 2021-07-30 PROBLEM — R22.43 LOCALIZED SWELLING, MASS, OR LUMP OF LOWER EXTREMITY, BILATERAL: Status: RESOLVED | Noted: 2020-01-07 | Resolved: 2021-07-30

## 2021-07-30 PROBLEM — M47.16 OSTEOARTHRITIS OF SPINE WITH MYELOPATHY, LUMBOSACRAL REGION: Status: RESOLVED | Noted: 2019-10-07 | Resolved: 2021-07-30

## 2021-07-30 PROBLEM — M54.9 BACKACHE WITH RADIATION: Status: RESOLVED | Noted: 2019-06-18 | Resolved: 2021-07-30

## 2021-07-30 PROBLEM — M11.259: Status: RESOLVED | Noted: 2019-03-15 | Resolved: 2021-07-30

## 2021-07-30 PROBLEM — M70.60 TROCHANTERIC BURSITIS: Status: RESOLVED | Noted: 2019-03-15 | Resolved: 2021-07-30

## 2021-07-30 PROBLEM — M79.7 FIBROSITIS: Status: RESOLVED | Noted: 2019-03-15 | Resolved: 2021-07-30

## 2021-07-30 PROCEDURE — 99214 PR OFFICE/OUTPT VISIT, EST, LEVL IV, 30-39 MIN: ICD-10-PCS | Mod: S$GLB,,, | Performed by: INTERNAL MEDICINE

## 2021-07-30 PROCEDURE — 99214 OFFICE O/P EST MOD 30 MIN: CPT | Mod: S$GLB,,, | Performed by: INTERNAL MEDICINE

## 2021-07-30 RX ORDER — FLUTICASONE PROPIONATE 50 MCG
SPRAY, SUSPENSION (ML) NASAL
Qty: 48 ML | Refills: 2 | Status: SHIPPED | OUTPATIENT
Start: 2021-07-30

## 2021-07-30 RX ORDER — CARVEDILOL 12.5 MG/1
12.5 TABLET ORAL 2 TIMES DAILY WITH MEALS
Qty: 60 TABLET | Refills: 11 | Status: SHIPPED | OUTPATIENT
Start: 2021-07-30 | End: 2021-12-03

## 2021-08-06 ENCOUNTER — CLINICAL SUPPORT (OUTPATIENT)
Dept: PRIMARY CARE CLINIC | Facility: CLINIC | Age: 86
End: 2021-08-06
Payer: MEDICARE

## 2021-08-06 ENCOUNTER — TELEPHONE (OUTPATIENT)
Dept: PRIMARY CARE CLINIC | Facility: CLINIC | Age: 86
End: 2021-08-06

## 2021-08-06 VITALS — DIASTOLIC BLOOD PRESSURE: 81 MMHG | SYSTOLIC BLOOD PRESSURE: 161 MMHG | HEART RATE: 69 BPM

## 2021-08-06 DIAGNOSIS — I10 ESSENTIAL HYPERTENSION: Primary | ICD-10-CM

## 2021-08-06 DIAGNOSIS — M25.532 LEFT WRIST PAIN: ICD-10-CM

## 2021-08-06 PROCEDURE — 99211 PR OFFICE/OUTPT VISIT, EST, LEVL I: ICD-10-PCS | Mod: S$GLB,,, | Performed by: INTERNAL MEDICINE

## 2021-08-06 PROCEDURE — 99211 OFF/OP EST MAY X REQ PHY/QHP: CPT | Mod: S$GLB,,, | Performed by: INTERNAL MEDICINE

## 2021-08-06 RX ORDER — LOSARTAN POTASSIUM 100 MG/1
100 TABLET ORAL DAILY
Qty: 90 TABLET | Refills: 3 | Status: SHIPPED | OUTPATIENT
Start: 2021-08-06 | End: 2022-07-06

## 2021-08-06 RX ORDER — NAPROXEN 375 MG/1
375 TABLET ORAL 2 TIMES DAILY PRN
Qty: 60 TABLET | Refills: 0 | Status: SHIPPED | OUTPATIENT
Start: 2021-08-06 | End: 2021-08-30

## 2021-11-02 ENCOUNTER — OFFICE VISIT (OUTPATIENT)
Dept: PRIMARY CARE CLINIC | Facility: CLINIC | Age: 86
End: 2021-11-02
Payer: MEDICARE

## 2021-11-02 VITALS
DIASTOLIC BLOOD PRESSURE: 82 MMHG | SYSTOLIC BLOOD PRESSURE: 178 MMHG | HEART RATE: 65 BPM | TEMPERATURE: 98 F | RESPIRATION RATE: 16 BRPM | HEIGHT: 66 IN | OXYGEN SATURATION: 98 % | WEIGHT: 141.38 LBS | BODY MASS INDEX: 22.72 KG/M2

## 2021-11-02 DIAGNOSIS — M47.16 OSTEOARTHRITIS OF SPINE WITH MYELOPATHY, LUMBOSACRAL REGION: ICD-10-CM

## 2021-11-02 DIAGNOSIS — K57.31 DIVERTICULOSIS OF LARGE INTESTINE WITH HEMORRHAGE: Primary | ICD-10-CM

## 2021-11-02 DIAGNOSIS — I25.110 CORONARY ARTERY DISEASE INVOLVING NATIVE CORONARY ARTERY OF NATIVE HEART WITH UNSTABLE ANGINA PECTORIS: ICD-10-CM

## 2021-11-02 DIAGNOSIS — I10 ESSENTIAL HYPERTENSION: ICD-10-CM

## 2021-11-02 PROCEDURE — 99214 PR OFFICE/OUTPT VISIT, EST, LEVL IV, 30-39 MIN: ICD-10-PCS | Mod: S$GLB,,, | Performed by: INTERNAL MEDICINE

## 2021-11-02 PROCEDURE — 99214 OFFICE O/P EST MOD 30 MIN: CPT | Mod: S$GLB,,, | Performed by: INTERNAL MEDICINE

## 2021-11-02 RX ORDER — HYDROCODONE BITARTRATE AND ACETAMINOPHEN 5; 325 MG/1; MG/1
1 TABLET ORAL 2 TIMES DAILY PRN
Qty: 30 TABLET | Refills: 0 | Status: SHIPPED | OUTPATIENT
Start: 2021-11-02 | End: 2021-12-10

## 2021-11-03 LAB
ALBUMIN SERPL-MCNC: 4 G/DL (ref 3.6–5.1)
ALBUMIN/GLOB SERPL: 1.3 (CALC) (ref 1–2.5)
ALP SERPL-CCNC: 52 U/L (ref 37–153)
ALT SERPL-CCNC: <3 U/L (ref 6–29)
AST SERPL-CCNC: 15 U/L (ref 10–35)
BASOPHILS # BLD AUTO: 38 CELLS/UL (ref 0–200)
BASOPHILS NFR BLD AUTO: 0.8 %
BILIRUB SERPL-MCNC: 0.4 MG/DL (ref 0.2–1.2)
BUN SERPL-MCNC: 10 MG/DL (ref 7–25)
BUN/CREAT SERPL: ABNORMAL (CALC) (ref 6–22)
CALCIUM SERPL-MCNC: 9.3 MG/DL (ref 8.6–10.4)
CHLORIDE SERPL-SCNC: 103 MMOL/L (ref 98–110)
CO2 SERPL-SCNC: 28 MMOL/L (ref 20–32)
CREAT SERPL-MCNC: 0.88 MG/DL (ref 0.6–0.88)
EOSINOPHIL # BLD AUTO: 58 CELLS/UL (ref 15–500)
EOSINOPHIL NFR BLD AUTO: 1.2 %
ERYTHROCYTE [DISTWIDTH] IN BLOOD BY AUTOMATED COUNT: 14.7 % (ref 11–15)
GLOBULIN SER CALC-MCNC: 3.1 G/DL (CALC) (ref 1.9–3.7)
GLUCOSE SERPL-MCNC: 85 MG/DL (ref 65–99)
HCT VFR BLD AUTO: 35.9 % (ref 35–45)
HGB BLD-MCNC: 10.7 G/DL (ref 11.7–15.5)
LYMPHOCYTES # BLD AUTO: 1450 CELLS/UL (ref 850–3900)
LYMPHOCYTES NFR BLD AUTO: 30.2 %
MCH RBC QN AUTO: 28.2 PG (ref 27–33)
MCHC RBC AUTO-ENTMCNC: 29.8 G/DL (ref 32–36)
MCV RBC AUTO: 94.5 FL (ref 80–100)
MONOCYTES # BLD AUTO: 614 CELLS/UL (ref 200–950)
MONOCYTES NFR BLD AUTO: 12.8 %
NEUTROPHILS # BLD AUTO: 2640 CELLS/UL (ref 1500–7800)
NEUTROPHILS NFR BLD AUTO: 55 %
PLATELET # BLD AUTO: 192 THOUSAND/UL (ref 140–400)
PMV BLD REES-ECKER: 15.2 FL (ref 7.5–12.5)
POTASSIUM SERPL-SCNC: 4 MMOL/L (ref 3.5–5.3)
PROT SERPL-MCNC: 7.1 G/DL (ref 6.1–8.1)
RBC # BLD AUTO: 3.8 MILLION/UL (ref 3.8–5.1)
SODIUM SERPL-SCNC: 141 MMOL/L (ref 135–146)
WBC # BLD AUTO: 4.8 THOUSAND/UL (ref 3.8–10.8)

## 2021-11-12 ENCOUNTER — TELEPHONE (OUTPATIENT)
Dept: PRIMARY CARE CLINIC | Facility: CLINIC | Age: 86
End: 2021-11-12
Payer: MEDICARE

## 2021-11-12 NOTE — TELEPHONE ENCOUNTER
----- Message from Rebecca Perales RN sent at 11/11/2021  5:05 PM CST -----  Contact: Patient    ----- Message -----  From: Consuelo Del Valle  Sent: 11/11/2021   9:13 AM CST  To: Laura Ortega Staff    Pt need an RX called in    Pt is having pains in her leg        .  [id number] [place] - [address]  [address] [place] [address]  Phone: [phone] Fax: [phone]          Please call the patient back at [phone]

## 2021-12-03 ENCOUNTER — TELEPHONE (OUTPATIENT)
Dept: PRIMARY CARE CLINIC | Facility: CLINIC | Age: 86
End: 2021-12-03

## 2021-12-03 ENCOUNTER — OFFICE VISIT (OUTPATIENT)
Dept: PRIMARY CARE CLINIC | Facility: CLINIC | Age: 86
End: 2021-12-03
Payer: MEDICARE

## 2021-12-03 VITALS
HEART RATE: 73 BPM | WEIGHT: 139 LBS | HEIGHT: 66 IN | OXYGEN SATURATION: 99 % | SYSTOLIC BLOOD PRESSURE: 178 MMHG | TEMPERATURE: 97 F | RESPIRATION RATE: 16 BRPM | BODY MASS INDEX: 22.34 KG/M2 | DIASTOLIC BLOOD PRESSURE: 88 MMHG

## 2021-12-03 DIAGNOSIS — D50.0 IRON DEFICIENCY ANEMIA DUE TO CHRONIC BLOOD LOSS: ICD-10-CM

## 2021-12-03 DIAGNOSIS — I25.110 CORONARY ARTERY DISEASE INVOLVING NATIVE CORONARY ARTERY OF NATIVE HEART WITH UNSTABLE ANGINA PECTORIS: Primary | ICD-10-CM

## 2021-12-03 DIAGNOSIS — K57.31 DIVERTICULOSIS OF LARGE INTESTINE WITH HEMORRHAGE: ICD-10-CM

## 2021-12-03 DIAGNOSIS — I10 ESSENTIAL HYPERTENSION: ICD-10-CM

## 2021-12-03 DIAGNOSIS — M54.17 LUMBOSACRAL RADICULOPATHY: ICD-10-CM

## 2021-12-03 PROCEDURE — 99214 PR OFFICE/OUTPT VISIT, EST, LEVL IV, 30-39 MIN: ICD-10-PCS | Mod: S$GLB,,, | Performed by: INTERNAL MEDICINE

## 2021-12-03 PROCEDURE — 99214 OFFICE O/P EST MOD 30 MIN: CPT | Mod: S$GLB,,, | Performed by: INTERNAL MEDICINE

## 2021-12-03 RX ORDER — SPIRONOLACTONE 50 MG/1
50 TABLET, FILM COATED ORAL DAILY
Qty: 30 TABLET | Refills: 11 | Status: SHIPPED | OUTPATIENT
Start: 2021-12-03 | End: 2022-08-10 | Stop reason: SDUPTHER

## 2021-12-03 RX ORDER — LABETALOL 100 MG/1
100 TABLET, FILM COATED ORAL 2 TIMES DAILY
Qty: 60 TABLET | Refills: 11 | Status: SHIPPED | OUTPATIENT
Start: 2021-12-03 | End: 2022-11-22

## 2021-12-03 RX ORDER — PANTOPRAZOLE SODIUM 40 MG/1
40 TABLET, DELAYED RELEASE ORAL DAILY
COMMUNITY
End: 2022-03-17 | Stop reason: SDUPTHER

## 2021-12-03 NOTE — TELEPHONE ENCOUNTER
Patient is not currently receiving care from [facility], I s/w Bailey, Care Manager and she said care was stopped due to [facility] denied re Authorization for services. [facility]#[phone].

## 2021-12-04 LAB
ALBUMIN SERPL-MCNC: 4 G/DL (ref 3.6–5.1)
ALBUMIN/GLOB SERPL: 1.3 (CALC) (ref 1–2.5)
ALP SERPL-CCNC: 43 U/L (ref 37–153)
ALT SERPL-CCNC: <3 U/L (ref 6–29)
AST SERPL-CCNC: 19 U/L (ref 10–35)
BASOPHILS # BLD AUTO: 57 CELLS/UL (ref 0–200)
BASOPHILS NFR BLD AUTO: 1.1 %
BILIRUB SERPL-MCNC: 0.3 MG/DL (ref 0.2–1.2)
BUN SERPL-MCNC: 10 MG/DL (ref 7–25)
BUN/CREAT SERPL: 11 (CALC) (ref 6–22)
CALCIUM SERPL-MCNC: 9.4 MG/DL (ref 8.6–10.4)
CHLORIDE SERPL-SCNC: 109 MMOL/L (ref 98–110)
CO2 SERPL-SCNC: 17 MMOL/L (ref 20–32)
CREAT SERPL-MCNC: 0.94 MG/DL (ref 0.6–0.88)
EOSINOPHIL # BLD AUTO: 120 CELLS/UL (ref 15–500)
EOSINOPHIL NFR BLD AUTO: 2.3 %
ERYTHROCYTE [DISTWIDTH] IN BLOOD BY AUTOMATED COUNT: 14.8 % (ref 11–15)
GLOBULIN SER CALC-MCNC: 3.2 G/DL (CALC) (ref 1.9–3.7)
GLUCOSE SERPL-MCNC: 81 MG/DL (ref 65–99)
HCT VFR BLD AUTO: 33.2 % (ref 35–45)
HGB BLD-MCNC: 10.5 G/DL (ref 11.7–15.5)
LYMPHOCYTES # BLD AUTO: 1414 CELLS/UL (ref 850–3900)
LYMPHOCYTES NFR BLD AUTO: 27.2 %
MCH RBC QN AUTO: 29.3 PG (ref 27–33)
MCHC RBC AUTO-ENTMCNC: 31.6 G/DL (ref 32–36)
MCV RBC AUTO: 92.7 FL (ref 80–100)
MONOCYTES # BLD AUTO: 629 CELLS/UL (ref 200–950)
MONOCYTES NFR BLD AUTO: 12.1 %
NEUTROPHILS # BLD AUTO: 2980 CELLS/UL (ref 1500–7800)
NEUTROPHILS NFR BLD AUTO: 57.3 %
PLATELET # BLD AUTO: 288 THOUSAND/UL (ref 140–400)
PMV BLD REES-ECKER: 13.4 FL (ref 7.5–12.5)
POTASSIUM SERPL-SCNC: 4 MMOL/L (ref 3.5–5.3)
PROT SERPL-MCNC: 7.2 G/DL (ref 6.1–8.1)
RBC # BLD AUTO: 3.58 MILLION/UL (ref 3.8–5.1)
SODIUM SERPL-SCNC: 141 MMOL/L (ref 135–146)
WBC # BLD AUTO: 5.2 THOUSAND/UL (ref 3.8–10.8)

## 2021-12-10 DIAGNOSIS — I10 ESSENTIAL HYPERTENSION: ICD-10-CM

## 2021-12-10 DIAGNOSIS — M54.17 LUMBOSACRAL RADICULOPATHY: Primary | ICD-10-CM

## 2021-12-10 RX ORDER — TRAMADOL HYDROCHLORIDE 50 MG/1
50 TABLET ORAL EVERY 8 HOURS PRN
Qty: 20 TABLET | Refills: 0 | Status: SHIPPED | OUTPATIENT
Start: 2021-12-10 | End: 2022-03-17 | Stop reason: SDUPTHER

## 2021-12-14 LAB
ALBUMIN SERPL-MCNC: 3.6 G/DL (ref 3.6–5.1)
ALBUMIN/GLOB SERPL: 1.3 (CALC) (ref 1–2.5)
ALP SERPL-CCNC: 38 U/L (ref 37–153)
ALT SERPL-CCNC: 3 U/L (ref 6–29)
AST SERPL-CCNC: 15 U/L (ref 10–35)
BILIRUB SERPL-MCNC: 0.4 MG/DL (ref 0.2–1.2)
BUN SERPL-MCNC: 10 MG/DL (ref 7–25)
BUN/CREAT SERPL: ABNORMAL (CALC) (ref 6–22)
CALCIUM SERPL-MCNC: 9 MG/DL (ref 8.6–10.4)
CHLORIDE SERPL-SCNC: 107 MMOL/L (ref 98–110)
CO2 SERPL-SCNC: 26 MMOL/L (ref 20–32)
CREAT SERPL-MCNC: 0.85 MG/DL (ref 0.6–0.88)
GLOBULIN SER CALC-MCNC: 2.7 G/DL (CALC) (ref 1.9–3.7)
GLUCOSE SERPL-MCNC: 81 MG/DL (ref 65–99)
POTASSIUM SERPL-SCNC: 4.2 MMOL/L (ref 3.5–5.3)
PROT SERPL-MCNC: 6.3 G/DL (ref 6.1–8.1)
SODIUM SERPL-SCNC: 139 MMOL/L (ref 135–146)

## 2021-12-26 ENCOUNTER — HISTORICAL (OUTPATIENT)
Dept: ADMINISTRATIVE | Facility: HOSPITAL | Age: 86
End: 2021-12-26

## 2022-03-04 ENCOUNTER — TELEPHONE (OUTPATIENT)
Dept: PRIMARY CARE CLINIC | Facility: CLINIC | Age: 87
End: 2022-03-04
Payer: MEDICARE

## 2022-03-04 NOTE — TELEPHONE ENCOUNTER
Patient needs f/u appt from ER discharge.   Patient was advised to go to ER per MD.  Heaviness in chest, went to ER 3 wks prior for same symptoms and was told to f/u with PCP but patient did not schedule due to transportation, C/o fatigue after doing house work and feels very worn out, tired, soreness, worsening GERD, RT knee pain.

## 2022-03-04 NOTE — TELEPHONE ENCOUNTER
----- Message from Jaylin Ramirez LPN sent at 3/4/2022  5:12 PM CST -----  Regarding: FW: Sooner Appointment  Contact: Patient    ----- Message -----  From: Carmenza Portillo  Sent: 3/4/2022   3:27 PM CST  To: Laura Ortega Staff  Subject: Sooner Appointment                               .Type:  Sooner Appointment Request    Caller is requesting a sooner appointment.  Caller declined first available appointment listed below.  Caller will not accept being placed on the waitlist and is requesting a message be sent to doctor.  Name of Caller: Patient  When is the first available appointment? 03/17/22  Symptoms: not feeling well  Would the patient rather a call back or a response via [facility]? Call  Best Call Back Number: .[phone] (home)     Additional Information:

## 2022-03-17 ENCOUNTER — OFFICE VISIT (OUTPATIENT)
Dept: PRIMARY CARE CLINIC | Facility: CLINIC | Age: 87
End: 2022-03-17
Payer: MEDICARE

## 2022-03-17 VITALS
TEMPERATURE: 98 F | SYSTOLIC BLOOD PRESSURE: 139 MMHG | DIASTOLIC BLOOD PRESSURE: 66 MMHG | OXYGEN SATURATION: 99 % | BODY MASS INDEX: 22.31 KG/M2 | HEIGHT: 66 IN | WEIGHT: 138.81 LBS | HEART RATE: 61 BPM | RESPIRATION RATE: 16 BRPM

## 2022-03-17 DIAGNOSIS — Z60.4 SOCIAL EXCLUSION AND REJECTION: ICD-10-CM

## 2022-03-17 DIAGNOSIS — M54.17 LUMBOSACRAL RADICULOPATHY: ICD-10-CM

## 2022-03-17 DIAGNOSIS — M25.562 CHRONIC PAIN OF BOTH KNEES: ICD-10-CM

## 2022-03-17 DIAGNOSIS — M25.561 CHRONIC PAIN OF BOTH KNEES: ICD-10-CM

## 2022-03-17 DIAGNOSIS — D50.0 IRON DEFICIENCY ANEMIA DUE TO CHRONIC BLOOD LOSS: ICD-10-CM

## 2022-03-17 DIAGNOSIS — K21.9 GASTROESOPHAGEAL REFLUX DISEASE WITHOUT ESOPHAGITIS: ICD-10-CM

## 2022-03-17 DIAGNOSIS — G89.29 CHRONIC PAIN OF BOTH KNEES: ICD-10-CM

## 2022-03-17 DIAGNOSIS — I25.110 CORONARY ARTERY DISEASE INVOLVING NATIVE CORONARY ARTERY OF NATIVE HEART WITH UNSTABLE ANGINA PECTORIS: Primary | ICD-10-CM

## 2022-03-17 PROCEDURE — 99214 OFFICE O/P EST MOD 30 MIN: CPT | Mod: S$GLB,,, | Performed by: INTERNAL MEDICINE

## 2022-03-17 PROCEDURE — 99214 PR OFFICE/OUTPT VISIT, EST, LEVL IV, 30-39 MIN: ICD-10-PCS | Mod: S$GLB,,, | Performed by: INTERNAL MEDICINE

## 2022-03-17 RX ORDER — TRAMADOL HYDROCHLORIDE 50 MG/1
50 TABLET ORAL EVERY 8 HOURS PRN
Qty: 20 TABLET | Refills: 0 | Status: SHIPPED | OUTPATIENT
Start: 2022-03-17 | End: 2022-07-06

## 2022-03-17 RX ORDER — PANTOPRAZOLE SODIUM 40 MG/1
40 TABLET, DELAYED RELEASE ORAL DAILY
Qty: 90 TABLET | Refills: 3 | Status: SHIPPED | OUTPATIENT
Start: 2022-03-17 | End: 2023-01-03

## 2022-03-17 RX ORDER — FERROUS FUMARATE/FOLIC ACID 106 MG-1MG
TABLET ORAL
Qty: 90 TABLET | Refills: 3 | Status: SHIPPED | OUTPATIENT
Start: 2022-03-17 | End: 2023-01-06 | Stop reason: SDUPTHER

## 2022-03-17 SDOH — SOCIAL DETERMINANTS OF HEALTH (SDOH): SOCIAL EXCLUSION AND REJECTION: Z60.4
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
## 2022-03-21 DIAGNOSIS — Z60.4 SOCIAL EXCLUSION AND REJECTION: Primary | ICD-10-CM

## 2022-03-21 DIAGNOSIS — I25.110 CORONARY ARTERY DISEASE INVOLVING NATIVE CORONARY ARTERY OF NATIVE HEART WITH UNSTABLE ANGINA PECTORIS: ICD-10-CM

## 2022-03-21 SDOH — SOCIAL DETERMINANTS OF HEALTH (SDOH): SOCIAL EXCLUSION AND REJECTION: Z60.4

## 2022-03-24 ENCOUNTER — OFFICE VISIT (OUTPATIENT)
Dept: ORTHOPEDICS | Facility: CLINIC | Age: 87
End: 2022-03-24
Payer: MEDICARE

## 2022-03-24 VITALS — TEMPERATURE: 98 F

## 2022-03-24 DIAGNOSIS — M17.0 PRIMARY OSTEOARTHRITIS OF BOTH KNEES: Primary | ICD-10-CM

## 2022-03-24 DIAGNOSIS — M25.561 CHRONIC PAIN OF BOTH KNEES: ICD-10-CM

## 2022-03-24 DIAGNOSIS — M25.562 CHRONIC PAIN OF BOTH KNEES: ICD-10-CM

## 2022-03-24 DIAGNOSIS — G89.29 CHRONIC PAIN OF BOTH KNEES: ICD-10-CM

## 2022-03-24 PROCEDURE — 99203 PR OFFICE/OUTPT VISIT, NEW, LEVL III, 30-44 MIN: ICD-10-PCS | Mod: 25,S$GLB,, | Performed by: ORTHOPAEDIC SURGERY

## 2022-03-24 PROCEDURE — 20610 DRAIN/INJ JOINT/BURSA W/O US: CPT | Mod: 50,S$GLB,, | Performed by: ORTHOPAEDIC SURGERY

## 2022-03-24 PROCEDURE — 20610 LARGE JOINT ASPIRATION/INJECTION: BILATERAL KNEE: ICD-10-PCS | Mod: 50,S$GLB,, | Performed by: ORTHOPAEDIC SURGERY

## 2022-03-24 PROCEDURE — 99203 OFFICE O/P NEW LOW 30 MIN: CPT | Mod: 25,S$GLB,, | Performed by: ORTHOPAEDIC SURGERY

## 2022-03-24 RX ORDER — TRIAMCINOLONE ACETONIDE 40 MG/ML
20 INJECTION, SUSPENSION INTRA-ARTICULAR; INTRAMUSCULAR
Status: DISCONTINUED | OUTPATIENT
Start: 2022-03-24 | End: 2022-03-24 | Stop reason: HOSPADM

## 2022-03-24 RX ADMIN — TRIAMCINOLONE ACETONIDE 20 MG: 40 INJECTION, SUSPENSION INTRA-ARTICULAR; INTRAMUSCULAR at 02:03

## 2022-03-24 NOTE — PROGRESS NOTES
Subjective:      Patient ID: Martha Caraballo is a 89 y.o. female.    Chief Complaint: Pain of the Left Knee and Pain of the Right Knee    HPI 89-year-old lady comes in with a long history of bilateral knee pain which has recently worsened.    Review of Systems   Constitutional: Negative for fever and weight loss.   Cardiovascular: Negative for chest pain and leg swelling.   Musculoskeletal: Positive for arthritis, joint pain, joint swelling and stiffness. Negative for muscle weakness.   Gastrointestinal: Negative for change in bowel habit.   Genitourinary: Negative for bladder incontinence and hematuria.   Neurological: Negative for focal weakness, numbness, paresthesias and sensory change.         Objective:      Knee Musculoskeletal Exam    Gait      Antalgic: right and left    Assistive device: cane    Inspection      Inspection - Right        Effusion: none        Deformity: moderate        Alignment: varus        Inspection - Left        Effusion: none        Deformity: moderate        Alignment: varus      Palpation      Palpation - Right        Tenderness: present          Lateral joint line: moderate          Medial joint line: moderate        Palpation - Left        Tenderness: present          Lateral joint line: moderate          Medial joint line: moderate      Range of Motion      Range of Motion - Right        Right knee range of motion is normal.        Range of Motion - Left        Left knee range of motion is normal.      Strength      Strength - Right        Right knee strength is normal.        Strength - Left        Left knee strength is normal.      Instability      Instability Signs - Right        Instability signs: none - stable        Instability Signs - Left        Instability signs: none - stable      Neurovascular      Neurovascular - Right        Right knee neurovascular exam is normal.        Neurovascular - Left        Left knee neurovascular exam is normal.                     Right Knee  Exam     Range of Motion   The patient has normal right knee ROM.    Muscle Strength   The patient has normal right knee strength.    Left Knee Exam     Range of Motion   The patient has normal left knee ROM.    Muscle Strength   The patient has normal left knee strength.              Assessment:       Encounter Diagnoses   Name Primary?    Primary osteoarthritis of both knees Yes    Chronic pain of both knees           Plan:       Martha was seen today for pain and pain.    Diagnoses and all orders for this visit:    Primary osteoarthritis of both knees  -     X-Ray Knee 1 or 2 View Left; Future  -     X-Ray Knee 1 or 2 View Right; Future    Chronic pain of both knees  -     Ambulatory referral/consult to Orthopedics      X-rays show loss of articular cartilage space in all 3 compartments of the knees.    The knees are injected today.  Return PI

## 2022-03-24 NOTE — PROCEDURES
Large Joint Aspiration/Injection: bilateral knee    Date/Time: 3/24/2022 2:00 PM  Performed by: Peter Ramirez MD  Authorized by: Peter Ramirez MD     Consent Done?:  Yes (Verbal)  Indications:  Arthritis  Prep: patient was prepped and draped in usual sterile fashion      Local anesthesia used?: Yes    Local anesthetic:  Lidocaine 2% without epinephrine  Anesthetic total (ml):  4      Details:  Needle Size:  25 G  Approach:  Anteromedial  Location:  Knee  Laterality:  Bilateral  Site:  Bilateral knee  Medications (Right):  20 mg triamcinolone acetonide 40 mg/mL  Medications (Left):  20 mg triamcinolone acetonide 40 mg/mL  Patient tolerance:  Patient tolerated the procedure well with no immediate complications

## 2022-04-04 ENCOUNTER — HISTORICAL (OUTPATIENT)
Dept: ADMINISTRATIVE | Facility: HOSPITAL | Age: 87
End: 2022-04-04

## 2022-04-14 ENCOUNTER — TELEPHONE (OUTPATIENT)
Dept: PRIMARY CARE CLINIC | Facility: CLINIC | Age: 87
End: 2022-04-14
Payer: MEDICARE

## 2022-04-14 DIAGNOSIS — R11.0 NAUSEA: Primary | ICD-10-CM

## 2022-04-14 RX ORDER — ONDANSETRON 4 MG/1
4 TABLET, FILM COATED ORAL EVERY 8 HOURS PRN
Qty: 15 TABLET | Refills: 0 | Status: SHIPPED | OUTPATIENT
Start: 2022-04-14 | End: 2022-07-06

## 2022-04-14 NOTE — TELEPHONE ENCOUNTER
"Pt having some nausea/vomiting for the past 3 days...pt has been taking Mylanta to try to sooth stomach.. she advised "feel it in the pit of stomach"    Pt is asking if MD can send in something for nausea.  "

## 2022-04-14 NOTE — TELEPHONE ENCOUNTER
----- Message from Jaylin Ramirez LPN sent at 4/13/2022  4:50 PM CDT -----    ----- Message -----  From: Meli Rosario  Sent: 4/13/2022   3:07 PM CDT  To: [facility] Staff    Pt wanting to speak with nurse, states she has been nauseas and having a little of cramping after eating today. Please Call back at [phone] or [phone]

## 2022-05-06 ENCOUNTER — HISTORICAL (OUTPATIENT)
Dept: ADMINISTRATIVE | Facility: HOSPITAL | Age: 87
End: 2022-05-06

## 2022-05-09 ENCOUNTER — TELEPHONE (OUTPATIENT)
Dept: PRIMARY CARE CLINIC | Facility: CLINIC | Age: 87
End: 2022-05-09
Payer: MEDICARE

## 2022-05-09 NOTE — TELEPHONE ENCOUNTER
----- Message from Meli Rosaroi sent at 5/9/2022  8:53 AM CDT -----  [facility] calling to schedule hospital discharge f/u within a week. Pt's Call back is [phone]

## 2022-07-06 ENCOUNTER — OFFICE VISIT (OUTPATIENT)
Dept: PRIMARY CARE CLINIC | Facility: CLINIC | Age: 87
End: 2022-07-06
Payer: MEDICARE

## 2022-07-06 VITALS
DIASTOLIC BLOOD PRESSURE: 74 MMHG | BODY MASS INDEX: 20.86 KG/M2 | HEIGHT: 66 IN | WEIGHT: 129.81 LBS | OXYGEN SATURATION: 99 % | HEART RATE: 64 BPM | RESPIRATION RATE: 16 BRPM | TEMPERATURE: 97 F | SYSTOLIC BLOOD PRESSURE: 138 MMHG

## 2022-07-06 DIAGNOSIS — M54.30 SCIATICA, UNSPECIFIED LATERALITY: ICD-10-CM

## 2022-07-06 DIAGNOSIS — M54.17 LUMBOSACRAL RADICULOPATHY: ICD-10-CM

## 2022-07-06 DIAGNOSIS — I25.110 CORONARY ARTERY DISEASE INVOLVING NATIVE CORONARY ARTERY OF NATIVE HEART WITH UNSTABLE ANGINA PECTORIS: ICD-10-CM

## 2022-07-06 DIAGNOSIS — I10 ESSENTIAL HYPERTENSION: ICD-10-CM

## 2022-07-06 DIAGNOSIS — R53.83 FATIGUE, UNSPECIFIED TYPE: Primary | ICD-10-CM

## 2022-07-06 PROCEDURE — 99214 PR OFFICE/OUTPT VISIT, EST, LEVL IV, 30-39 MIN: ICD-10-PCS | Mod: S$GLB,,, | Performed by: INTERNAL MEDICINE

## 2022-07-06 PROCEDURE — 99214 OFFICE O/P EST MOD 30 MIN: CPT | Mod: S$GLB,,, | Performed by: INTERNAL MEDICINE

## 2022-07-06 RX ORDER — RANOLAZINE 500 MG/1
500 TABLET, EXTENDED RELEASE ORAL EVERY 12 HOURS
COMMUNITY
Start: 2022-06-28 | End: 2023-01-03 | Stop reason: SDUPTHER

## 2022-07-06 RX ORDER — GABAPENTIN 300 MG/1
300 CAPSULE ORAL 3 TIMES DAILY
Qty: 90 CAPSULE | Refills: 1 | Status: SHIPPED | OUTPATIENT
Start: 2022-07-06 | End: 2022-09-06

## 2022-07-06 RX ORDER — LOSARTAN POTASSIUM 25 MG/1
25 TABLET ORAL DAILY
Qty: 90 TABLET | Refills: 3 | Status: SHIPPED | OUTPATIENT
Start: 2022-07-06 | End: 2022-08-10

## 2022-07-06 RX ORDER — TRAMADOL HYDROCHLORIDE 50 MG/1
50 TABLET ORAL EVERY 8 HOURS PRN
Qty: 20 TABLET | Refills: 0 | Status: SHIPPED | OUTPATIENT
Start: 2022-07-06 | End: 2022-08-10 | Stop reason: SDUPTHER

## 2022-07-06 RX ORDER — METHOCARBAMOL 750 MG/1
TABLET, FILM COATED ORAL
COMMUNITY
Start: 2022-04-04 | End: 2022-08-10
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
## 2022-08-10 ENCOUNTER — OFFICE VISIT (OUTPATIENT)
Dept: PRIMARY CARE CLINIC | Facility: CLINIC | Age: 87
End: 2022-08-10
Payer: MEDICARE

## 2022-08-10 VITALS
DIASTOLIC BLOOD PRESSURE: 61 MMHG | WEIGHT: 130.38 LBS | BODY MASS INDEX: 20.95 KG/M2 | TEMPERATURE: 98 F | HEART RATE: 76 BPM | OXYGEN SATURATION: 97 % | SYSTOLIC BLOOD PRESSURE: 130 MMHG | RESPIRATION RATE: 16 BRPM | HEIGHT: 66 IN

## 2022-08-10 DIAGNOSIS — I25.110 CORONARY ARTERY DISEASE INVOLVING NATIVE CORONARY ARTERY OF NATIVE HEART WITH UNSTABLE ANGINA PECTORIS: ICD-10-CM

## 2022-08-10 DIAGNOSIS — R11.0 NAUSEA: Primary | ICD-10-CM

## 2022-08-10 DIAGNOSIS — I10 ESSENTIAL HYPERTENSION: ICD-10-CM

## 2022-08-10 DIAGNOSIS — M54.17 LUMBOSACRAL RADICULOPATHY: ICD-10-CM

## 2022-08-10 PROCEDURE — 99214 OFFICE O/P EST MOD 30 MIN: CPT | Mod: S$GLB,,, | Performed by: INTERNAL MEDICINE

## 2022-08-10 PROCEDURE — 99214 PR OFFICE/OUTPT VISIT, EST, LEVL IV, 30-39 MIN: ICD-10-PCS | Mod: S$GLB,,, | Performed by: INTERNAL MEDICINE

## 2022-08-10 RX ORDER — TRAMADOL HYDROCHLORIDE 50 MG/1
50 TABLET ORAL EVERY 8 HOURS PRN
Qty: 20 TABLET | Refills: 0 | Status: SHIPPED | OUTPATIENT
Start: 2022-08-10 | End: 2023-01-03

## 2022-08-10 RX ORDER — SPIRONOLACTONE 50 MG/1
50 TABLET, FILM COATED ORAL DAILY
Qty: 30 TABLET | Refills: 11 | Status: SHIPPED | OUTPATIENT
Start: 2022-08-10 | End: 2023-01-03

## 2022-08-10 RX ORDER — ONDANSETRON 4 MG/1
4 TABLET, FILM COATED ORAL EVERY 8 HOURS PRN
Qty: 30 TABLET | Refills: 0 | Status: SHIPPED | OUTPATIENT
Start: 2022-08-10 | End: 2023-01-03
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
## 2022-08-11 LAB
ALBUMIN SERPL-MCNC: 4 G/DL (ref 3.6–5.1)
ALBUMIN/GLOB SERPL: 1.6 (CALC) (ref 1–2.5)
ALP SERPL-CCNC: 35 U/L (ref 37–153)
ALT SERPL-CCNC: <3 U/L (ref 6–29)
AST SERPL-CCNC: 14 U/L (ref 10–35)
BASOPHILS # BLD AUTO: 48 CELLS/UL (ref 0–200)
BASOPHILS NFR BLD AUTO: 1.1 %
BILIRUB SERPL-MCNC: 0.4 MG/DL (ref 0.2–1.2)
BUN SERPL-MCNC: 21 MG/DL (ref 7–25)
BUN/CREAT SERPL: 18 (CALC) (ref 6–22)
CALCIUM SERPL-MCNC: 9.6 MG/DL (ref 8.6–10.4)
CHLORIDE SERPL-SCNC: 106 MMOL/L (ref 98–110)
CO2 SERPL-SCNC: 23 MMOL/L (ref 20–32)
CREAT SERPL-MCNC: 1.19 MG/DL (ref 0.6–0.95)
EGFR: 43 ML/MIN/1.73M2
EOSINOPHIL # BLD AUTO: 101 CELLS/UL (ref 15–500)
EOSINOPHIL NFR BLD AUTO: 2.3 %
ERYTHROCYTE [DISTWIDTH] IN BLOOD BY AUTOMATED COUNT: 13.3 % (ref 11–15)
GLOBULIN SER CALC-MCNC: 2.5 G/DL (CALC) (ref 1.9–3.7)
GLUCOSE SERPL-MCNC: 96 MG/DL (ref 65–139)
HCT VFR BLD AUTO: 30.4 % (ref 35–45)
HGB BLD-MCNC: 9.7 G/DL (ref 11.7–15.5)
LYMPHOCYTES # BLD AUTO: 1377 CELLS/UL (ref 850–3900)
LYMPHOCYTES NFR BLD AUTO: 31.3 %
MCH RBC QN AUTO: 29.5 PG (ref 27–33)
MCHC RBC AUTO-ENTMCNC: 31.9 G/DL (ref 32–36)
MCV RBC AUTO: 92.4 FL (ref 80–100)
MONOCYTES # BLD AUTO: 475 CELLS/UL (ref 200–950)
MONOCYTES NFR BLD AUTO: 10.8 %
NEUTROPHILS # BLD AUTO: 2398 CELLS/UL (ref 1500–7800)
NEUTROPHILS NFR BLD AUTO: 54.5 %
PLATELET # BLD AUTO: 189 THOUSAND/UL (ref 140–400)
PMV BLD REES-ECKER: 14.1 FL (ref 7.5–12.5)
POTASSIUM SERPL-SCNC: 5.7 MMOL/L (ref 3.5–5.3)
PROT SERPL-MCNC: 6.5 G/DL (ref 6.1–8.1)
RBC # BLD AUTO: 3.29 MILLION/UL (ref 3.8–5.1)
SODIUM SERPL-SCNC: 139 MMOL/L (ref 135–146)
TSH SERPL-ACNC: 2.03 MIU/L (ref 0.4–4.5)
WBC # BLD AUTO: 4.4 THOUSAND/UL (ref 3.8–10.8)

## 2022-08-17 ENCOUNTER — OUTPATIENT CASE MANAGEMENT (OUTPATIENT)
Dept: ADMINISTRATIVE | Facility: OTHER | Age: 87
End: 2022-08-17
Payer: MEDICARE

## 2022-08-17 NOTE — PROGRESS NOTES
"Outpatient Care Management  Initial Patient Assessment    Patient: Martha Caraballo  MRN: [id number]  Date of Service: 08/17/2022  Completed by: Caitlyn Mac RN  Referral Date: 07/06/2022  Program: High Risk  Status: Ongoing  Effective Dates: 8/17/2022 - present  Responsible Staff: Caitlyn Mac RN        Reason for Visit   Patient presents with    OPCM Chart Review     8-17-22    OPCM Enrollment Call     8-17-22    Initial Assessment     8-17-22    Nursing Assessment     8-17-22    Plan Of Care     8-17-22    Other [facility]     8-17-22  [facility] Referral      OPCM Welcome Letter     8-17-22         Martha Caraballo was referred by  Dr. Sanchez  for fatigue with Risk Score of 78.3%. Patient lives alone and has a son who lives away from her and tries to help as much as possible.  She's able to fix her some "small meals but I don't cook everyday."  Not able to tend to her housework due to "tiring out."  Areas of need identified by patient include having someone come daily to help her out. She mentioned she's being evaluated on 8-23-22 for services but unsure exactly what.  Will send a [facility] referral for assistance with social determinants.     Assessment Documentation     OPCM Initial Assessment    Involvement of Care  Do I have permission to speak with other family members about your care?: No  Assessment completed by: Patient  Identified Areas of Need  Housing: yes (Comment: Issues with shower having a hole near knobs - Will refer [facility])  Medication Adherence: No  *Active medication list was reviewed and reconciled with patient and/or caregiver:   Nutrition: yes (Comment: Able to fix small meals but "I don't do that everyday")  Lab Adherence: no  Depression: No  Cognitive/Behavioral Health: no  Communication: no  Health Literacy: Yes  Fall risk?: No  Equipment/Supplies/Services: yes (Comment: She's asking for help with obtaining a wheelchair ramp)          Problem List and History         Reviewed medical and social " history with patient and/or caregiver. A complex care plan was discussed and completed today, with input from patient and/or caregiver.    Patient Instructions     Instructions were provided via the [facility] patient resources and are available for the patient to view on the patient portal, if active.    Follow up in about 12 days (around 8/29/2022).    Todays OPCM Self-Management Care Plan was developed with the patients/caregivers input and was based on identified barriers from todays assessment.  Goals were written today with the patient/caregiver and the patient has agreed to work towards these goals to improve his/her overall well-being. Patient verbalized understanding of the care plan, goals, and all of today's instructions. Encouraged patient/caregiver to communicate with his/her physician and health care team about health conditions and the treatment plan.  Provided my contact information today and encouraged patient/caregiver to call me with any questions as needed.

## 2022-08-17 NOTE — LETTER
August 17, 2022             Dear Martha,    Welcome to [facility]s Complex Care Management Program.  It was a pleasure talking with you today.  My name is Caitlyn Mac, and I look forward to being your Care Manager.  My goal is to help you function at the healthiest and highest level possible.  You can contact me directly at ([phone]).    As an [facility] patient, some of the services we may be able to provide include:      Development of an individualized care plan with a Registered Nurse    Connection with available resources and services     Coordinate communication among your care team members    Provide coaching and education    Help you understand your doctors treatment plan    All services provided by [facility]s Complex Care Managers and other care team members are coordinated with and communicated to your primary care team.      As part of your enrollment, you will be receiving education materials and more information about these services in your [facility] account, by phone or through the mail.     Sincerely,        Caitlyn Mac, RN  [facility]   Out-patient RN Complex Care Manager       
Pulmonology
Surgery

## 2022-08-19 ENCOUNTER — TELEPHONE (OUTPATIENT)
Dept: PRIMARY CARE CLINIC | Facility: CLINIC | Age: 87
End: 2022-08-19
Payer: MEDICARE

## 2022-08-19 NOTE — TELEPHONE ENCOUNTER
Phoned patient about recent labs at [facility], she states MD and ER stopped Spirnolactone and Potassium, VISHAL sent for D/S.

## 2022-08-30 ENCOUNTER — OUTPATIENT CASE MANAGEMENT (OUTPATIENT)
Dept: ADMINISTRATIVE | Facility: OTHER | Age: 87
End: 2022-08-30
Payer: MEDICARE

## 2022-09-15 ENCOUNTER — OUTPATIENT CASE MANAGEMENT (OUTPATIENT)
Dept: ADMINISTRATIVE | Facility: OTHER | Age: 87
End: 2022-09-15
Payer: MEDICARE

## 2022-09-18 ENCOUNTER — HISTORICAL (OUTPATIENT)
Dept: ADMINISTRATIVE | Facility: HOSPITAL | Age: 87
End: 2022-09-18

## 2022-09-19 ENCOUNTER — HISTORICAL (OUTPATIENT)
Dept: ADMINISTRATIVE | Facility: HOSPITAL | Age: 87
End: 2022-09-19

## 2022-09-20 ENCOUNTER — HISTORICAL (OUTPATIENT)
Dept: ADMINISTRATIVE | Facility: HOSPITAL | Age: 87
End: 2022-09-20

## 2022-10-05 ENCOUNTER — OUTPATIENT CASE MANAGEMENT (OUTPATIENT)
Dept: ADMINISTRATIVE | Facility: OTHER | Age: 87
End: 2022-10-05
Payer: MEDICARE

## 2022-11-01 ENCOUNTER — OUTPATIENT CASE MANAGEMENT (OUTPATIENT)
Dept: ADMINISTRATIVE | Facility: OTHER | Age: 87
End: 2022-11-01
Payer: MEDICARE

## 2022-11-20 DIAGNOSIS — I10 ESSENTIAL HYPERTENSION: ICD-10-CM

## 2022-11-21 ENCOUNTER — OUTPATIENT CASE MANAGEMENT (OUTPATIENT)
Dept: ADMINISTRATIVE | Facility: OTHER | Age: 87
End: 2022-11-21
Payer: MEDICARE

## 2022-11-21 NOTE — PROGRESS NOTES
11-21-22 Attempted call back unsuccessful for follow up on her call to PCP. Will attempt tomorrow.    11-22-22 Spoke with Ms. Thomas and she phoned her PCP in regards to her concerns of elevated BP yesterday, 11-21-22.  Appointment isn't until 12-19-22 @ 3:15 and CM informed Martha that if her BP rises again and continues to stay elevated, and if she is experiencing headache or dizziness along with that to report to the ER and not wait for her scheduled appointment.  Ms. Thomas voiced understanding and assured me she will follow thru if experiencing any concerns.
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
## 2022-11-22 RX ORDER — LABETALOL 100 MG/1
TABLET, FILM COATED ORAL
Qty: 180 TABLET | Refills: 3 | Status: ON HOLD | OUTPATIENT
Start: 2022-11-22 | End: 2023-03-08 | Stop reason: HOSPADM

## 2022-12-12 ENCOUNTER — OUTPATIENT CASE MANAGEMENT (OUTPATIENT)
Dept: ADMINISTRATIVE | Facility: OTHER | Age: 87
End: 2022-12-12
Payer: MEDICARE

## 2022-12-19 ENCOUNTER — OUTPATIENT CASE MANAGEMENT (OUTPATIENT)
Dept: ADMINISTRATIVE | Facility: OTHER | Age: 87
End: 2022-12-19
Payer: MEDICARE

## 2022-12-20 ENCOUNTER — TELEPHONE (OUTPATIENT)
Dept: PRIMARY CARE CLINIC | Facility: CLINIC | Age: 87
End: 2022-12-20
Payer: MEDICARE

## 2022-12-20 NOTE — TELEPHONE ENCOUNTER
----- Message from Jaylin Ramirez LPN sent at 12/19/2022  4:29 PM CST -----  Contact: self    ----- Message -----  From: Ernesto Felix  Sent: 12/19/2022   2:06 PM CST  To: Laura Ortega Staff    Pt called and asked if the office can answer the pharmacy fax since she couldn't go to her appt. Please call [phone]

## 2022-12-20 NOTE — TELEPHONE ENCOUNTER
Called and LVM                ----- Message from Jaylin Ramirez LPN sent at 12/19/2022  4:29 PM CST -----  Contact: self    ----- Message -----  From: Ernesto Felix  Sent: 12/19/2022   2:06 PM CST  To: Laura Ortega Staff    Pt called and asked if the office can answer the pharmacy fax since she couldn't go to her appt. Please call [phone]

## 2022-12-21 LAB
CHOLEST SERPL-MSCNC: 157 <200
HDLC SERPL-MCNC: 86
LDLC SERPL CALC-MCNC: 59 MG/DL
TRIGL SERPL-MCNC: 60 MG/DL (ref 0–149)
VLDL CHOLESTEROL: 12

## 2022-12-27 ENCOUNTER — TELEPHONE (OUTPATIENT)
Dept: PRIMARY CARE CLINIC | Facility: CLINIC | Age: 87
End: 2022-12-27
Payer: MEDICARE

## 2022-12-27 NOTE — TELEPHONE ENCOUNTER
Please schedule for his next available.     ----- Message from Alba Sarah sent at 12/27/2022 11:01 AM CST -----  Regarding: Sooner Appointment  Contact: patient  Per phone call with patient she stated that she was in the hospital  and was advised to see her physician as soon as possible. Please return call at [phone] (home).  The caller did not give any specific.    Thanks,  SJ

## 2022-12-27 NOTE — TELEPHONE ENCOUNTER
----- Message from Rosalee Bass sent at 12/27/2022  1:31 PM CST -----  Contact: self  Patientis requesting a call back from the nurse..Please call her back at [phone]

## 2023-01-03 ENCOUNTER — OFFICE VISIT (OUTPATIENT)
Dept: PRIMARY CARE CLINIC | Facility: CLINIC | Age: 88
End: 2023-01-03
Payer: MEDICARE

## 2023-01-03 DIAGNOSIS — K62.5 BRIGHT RED BLOOD PER RECTUM: Primary | ICD-10-CM

## 2023-01-03 DIAGNOSIS — I25.110 CORONARY ARTERY DISEASE INVOLVING NATIVE CORONARY ARTERY OF NATIVE HEART WITH UNSTABLE ANGINA PECTORIS: ICD-10-CM

## 2023-01-03 DIAGNOSIS — M54.17 LUMBOSACRAL RADICULOPATHY: ICD-10-CM

## 2023-01-03 PROCEDURE — 99214 OFFICE O/P EST MOD 30 MIN: CPT | Mod: S$GLB,,, | Performed by: INTERNAL MEDICINE

## 2023-01-03 PROCEDURE — 99214 PR OFFICE/OUTPT VISIT, EST, LEVL IV, 30-39 MIN: ICD-10-PCS | Mod: S$GLB,,, | Performed by: INTERNAL MEDICINE

## 2023-01-03 RX ORDER — HYDROCODONE BITARTRATE AND ACETAMINOPHEN 5; 325 MG/1; MG/1
1 TABLET ORAL EVERY 6 HOURS PRN
Qty: 15 TABLET | Refills: 0 | Status: SHIPPED | OUTPATIENT
Start: 2023-01-03

## 2023-01-03 RX ORDER — RANOLAZINE 500 MG/1
500 TABLET, EXTENDED RELEASE ORAL 2 TIMES DAILY
Qty: 180 TABLET | Refills: 1 | Status: ON HOLD | OUTPATIENT
Start: 2023-01-03 | End: 2023-03-08 | Stop reason: HOSPADM
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
## 2023-01-05 ENCOUNTER — OUTPATIENT CASE MANAGEMENT (OUTPATIENT)
Dept: ADMINISTRATIVE | Facility: OTHER | Age: 88
End: 2023-01-05
Payer: MEDICARE

## 2023-01-06 DIAGNOSIS — M54.30 SCIATICA, UNSPECIFIED LATERALITY: ICD-10-CM

## 2023-01-06 DIAGNOSIS — D50.0 IRON DEFICIENCY ANEMIA DUE TO CHRONIC BLOOD LOSS: ICD-10-CM

## 2023-01-06 RX ORDER — FERROUS FUMARATE/FOLIC ACID 106 MG-1MG
TABLET ORAL
Qty: 90 TABLET | Refills: 3 | Status: SHIPPED | OUTPATIENT
Start: 2023-01-06

## 2023-01-06 RX ORDER — GABAPENTIN 300 MG/1
300 CAPSULE ORAL 3 TIMES DAILY
Qty: 90 CAPSULE | Refills: 1 | Status: SHIPPED | OUTPATIENT
Start: 2023-01-06

## 2023-01-06 NOTE — TELEPHONE ENCOUNTER
----- Message from Guillermina Mena sent at 1/6/2023  9:10 AM CST -----  Contact: self  Type - Refill Request     Patient needs a refill on these medications -   gabapentin (NEURONTIN) 300 MG capsule  ferrous fumarate-folic acid 324 mg, 106mg iron,-1mg (HEMATINIC/FOLIC ACID) Tab    Can we send to   [facility]/pharmacy #[id number] - [place] - [address]  [address] [place] [address]  Phone: [phone] Fax: [phone]    And let patient know once they've been sent? Thanks

## 2023-01-09 ENCOUNTER — OUTPATIENT CASE MANAGEMENT (OUTPATIENT)
Dept: ADMINISTRATIVE | Facility: OTHER | Age: 88
End: 2023-01-09
Payer: MEDICARE

## 2023-01-09 NOTE — PROGRESS NOTES
Outpatient Care Management  Plan of Care Follow Up Visit    Patient: Martha Caraballo  MRN: [id number]  Date of Service: 01/09/2023  Completed by: Caitlyn Mac RN  Referral Date: 07/06/2022      Brief Summary: Ms. Thomas was recently discharged from hospital on December 24th and verbalized feeling much better. Her wheelchair ramp was installed by a [employer] and she is very pleased with the outcome. They also built her a small porch on it for her to sit outside and enjoy the view. Reported BP yesterday was 137/80 and hadn't taken today due to her sleeping late.   Next Steps: CM will follow up around 2-3-23 for reassessment and possible closure of chart if no new needs are founded.

## 2023-01-20 ENCOUNTER — HISTORICAL (OUTPATIENT)
Dept: ADMINISTRATIVE | Facility: HOSPITAL | Age: 88
End: 2023-01-20
Payer: MEDICARE

## 2023-01-26 ENCOUNTER — TELEPHONE (OUTPATIENT)
Dept: PRIMARY CARE CLINIC | Facility: CLINIC | Age: 88
End: 2023-01-26
Payer: MEDICARE

## 2023-01-26 NOTE — TELEPHONE ENCOUNTER
Returned call to Bharti, she states Dr. Pozo is not available until April 2023 and would like to know if its ok if patient sees a different provider. Advised it is up to the patient but MD would rather patient be seen sooner than later.

## 2023-01-26 NOTE — TELEPHONE ENCOUNTER
----- Message from Su Garcia sent at 1/26/2023  2:58 PM CST -----  Contact: Bharti([facility])  Bharti called to consult with nurse or staff regarding a referral they received for the patient. She is needing to clarify some information and wanted to speak with office if possible. Bharti would like a call back and can be reached at [phone]. Thanks/MR

## 2023-02-02 ENCOUNTER — OUTPATIENT CASE MANAGEMENT (OUTPATIENT)
Dept: ADMINISTRATIVE | Facility: OTHER | Age: 88
End: 2023-02-02
Payer: MEDICARE

## 2023-02-27 ENCOUNTER — HOSPITAL ENCOUNTER (INPATIENT)
Facility: HOSPITAL | Age: 88
LOS: 8 days | Discharge: NURSING, REHAB OR LONG-TERM CARE | DRG: 313 | End: 2023-03-08
Admitting: FAMILY MEDICINE
Payer: MEDICARE

## 2023-02-27 DIAGNOSIS — R07.9 CHEST PAIN, UNSPECIFIED TYPE: Primary | ICD-10-CM

## 2023-02-27 DIAGNOSIS — I25.10 CORONARY ARTERY DISEASE, UNSPECIFIED VESSEL OR LESION TYPE, UNSPECIFIED WHETHER ANGINA PRESENT, UNSPECIFIED WHETHER NATIVE OR TRANSPLANTED HEART: ICD-10-CM

## 2023-02-27 DIAGNOSIS — I35.0 AORTIC VALVE STENOSIS, ETIOLOGY OF CARDIAC VALVE DISEASE UNSPECIFIED: ICD-10-CM

## 2023-02-27 DIAGNOSIS — R07.9 CHEST PAIN: ICD-10-CM

## 2023-02-27 DIAGNOSIS — I35.0 AORTIC STENOSIS: ICD-10-CM

## 2023-02-27 LAB
ALBUMIN SERPL-MCNC: 3.6 G/DL (ref 3.4–5)
ALBUMIN/GLOB SERPL: 1.2 RATIO
ALP SERPL-CCNC: 43 UNIT/L (ref 50–144)
ALT SERPL-CCNC: <4 UNIT/L (ref 1–45)
ANION GAP SERPL CALC-SCNC: 6 MEQ/L (ref 2–13)
AST SERPL-CCNC: 23 UNIT/L (ref 14–36)
BASOPHILS # BLD AUTO: 0.06 X10(3)/MCL (ref 0.01–0.08)
BASOPHILS NFR BLD AUTO: 1.4 % (ref 0.1–1.2)
BILIRUBIN DIRECT+TOT PNL SERPL-MCNC: 0.2 MG/DL (ref 0–1)
BNP BLD-MCNC: 515 PG/ML (ref 10–450)
BUN SERPL-MCNC: 19 MG/DL (ref 7–20)
CALCIUM SERPL-MCNC: 8.3 MG/DL (ref 8.4–10.2)
CHLORIDE SERPL-SCNC: 110 MMOL/L (ref 98–110)
CO2 SERPL-SCNC: 24 MMOL/L (ref 21–32)
CREAT SERPL-MCNC: 0.95 MG/DL (ref 0.66–1.25)
CREAT/UREA NIT SERPL: 20 (ref 12–20)
EOSINOPHIL # BLD AUTO: 0.18 X10(3)/MCL (ref 0.04–0.36)
EOSINOPHIL NFR BLD AUTO: 4.1 % (ref 0.7–7)
ERYTHROCYTE [DISTWIDTH] IN BLOOD BY AUTOMATED COUNT: 16.6 % (ref 11–14.5)
GFR SERPLBLD CREATININE-BSD FMLA CKD-EPI: 56 MLS/MIN/1.73/M2
GLOBULIN SER-MCNC: 3 GM/DL (ref 2–3.9)
GLUCOSE SERPL-MCNC: 106 MG/DL (ref 70–115)
HCT VFR BLD AUTO: 25.9 % (ref 36–48)
HGB BLD-MCNC: 8.6 G/DL (ref 11.8–16)
IMM GRANULOCYTES # BLD AUTO: 0.01 X10(3)/MCL (ref 0–0.03)
IMM GRANULOCYTES NFR BLD AUTO: 0.2 % (ref 0–0.5)
LYMPHOCYTES # BLD AUTO: 1.25 X10(3)/MCL (ref 1.16–3.74)
LYMPHOCYTES NFR BLD AUTO: 28.2 % (ref 20–55)
MCH RBC QN AUTO: 27.8 PG (ref 27–34)
MCV RBC AUTO: 83.8 FL (ref 79–99)
MEAN CELL HEMOGLOBIN CONCENTRATION (OHS) G/DL: 33.2 G/DL (ref 31–37)
MONOCYTES # BLD AUTO: 0.57 X10(3)/MCL (ref 0.24–0.36)
MONOCYTES NFR BLD AUTO: 12.9 % (ref 4.7–12.5)
NEUTROPHILS # BLD AUTO: 2.36 X10(3)/MCL (ref 1.56–6.13)
NEUTROPHILS NFR BLD AUTO: 53.2 % (ref 37–73)
NRBC BLD AUTO-RTO: 0 % (ref 0–1)
PLATELET # BLD AUTO: 236 X10(3)/MCL (ref 140–371)
PMV BLD AUTO: 11.7 FL (ref 9.4–12.4)
POTASSIUM SERPL-SCNC: 4.2 MMOL/L (ref 3.5–5.1)
PROT SERPL-MCNC: 6.6 GM/DL (ref 6.3–8.2)
RBC # BLD AUTO: 3.09 X10(6)/MCL (ref 4–5.1)
SODIUM SERPL-SCNC: 140 MMOL/L (ref 135–145)
TROPONIN I SERPL-MCNC: <0.012 NG/ML (ref 0–0.03)
WBC # SPEC AUTO: 4.4 X10(3)/MCL (ref 4–11.5)

## 2023-02-27 PROCEDURE — 93010 ELECTROCARDIOGRAM REPORT: CPT | Mod: ,,, | Performed by: INTERNAL MEDICINE

## 2023-02-27 PROCEDURE — 84484 ASSAY OF TROPONIN QUANT: CPT

## 2023-02-27 PROCEDURE — 83880 ASSAY OF NATRIURETIC PEPTIDE: CPT

## 2023-02-27 PROCEDURE — 99285 EMERGENCY DEPT VISIT HI MDM: CPT | Mod: 25

## 2023-02-27 PROCEDURE — 80053 COMPREHEN METABOLIC PANEL: CPT

## 2023-02-27 PROCEDURE — 36415 COLL VENOUS BLD VENIPUNCTURE: CPT

## 2023-02-27 PROCEDURE — G0378 HOSPITAL OBSERVATION PER HR: HCPCS

## 2023-02-27 PROCEDURE — 85025 COMPLETE CBC W/AUTO DIFF WBC: CPT

## 2023-02-27 PROCEDURE — 93005 ELECTROCARDIOGRAM TRACING: CPT

## 2023-02-27 PROCEDURE — 25000003 PHARM REV CODE 250

## 2023-02-27 PROCEDURE — 93010 EKG 12-LEAD: ICD-10-PCS | Mod: ,,, | Performed by: INTERNAL MEDICINE

## 2023-02-27 RX ORDER — HYDROCODONE BITARTRATE AND ACETAMINOPHEN 5; 325 MG/1; MG/1
1 TABLET ORAL
Status: COMPLETED | OUTPATIENT
Start: 2023-02-27 | End: 2023-02-27

## 2023-02-27 RX ORDER — ROSUVASTATIN CALCIUM 5 MG/1
5 TABLET, COATED ORAL DAILY
COMMUNITY

## 2023-02-27 RX ORDER — ALPRAZOLAM 0.5 MG/1
0.5 TABLET ORAL
Status: COMPLETED | OUTPATIENT
Start: 2023-02-27 | End: 2023-02-27

## 2023-02-27 RX ORDER — DICYCLOMINE HYDROCHLORIDE 20 MG/1
20 TABLET ORAL EVERY 6 HOURS
COMMUNITY

## 2023-02-27 RX ORDER — LOSARTAN POTASSIUM 50 MG/1
50 TABLET ORAL DAILY
Status: ON HOLD | COMMUNITY
End: 2023-03-08 | Stop reason: HOSPADM

## 2023-02-27 RX ORDER — PANTOPRAZOLE SODIUM 40 MG/1
40 TABLET, DELAYED RELEASE ORAL DAILY
COMMUNITY

## 2023-02-27 RX ADMIN — ALPRAZOLAM 0.5 MG: 0.5 TABLET ORAL at 11:02

## 2023-02-27 RX ADMIN — HYDROCODONE BITARTRATE AND ACETAMINOPHEN 1 TABLET: 5; 325 TABLET ORAL at 11:02

## 2023-02-28 PROBLEM — E78.5 HLD (HYPERLIPIDEMIA): Status: ACTIVE | Noted: 2023-02-28

## 2023-02-28 PROBLEM — R07.9 CHEST PAIN: Status: ACTIVE | Noted: 2019-06-25

## 2023-02-28 LAB
ABO AND RH: NORMAL
ABORH RETYPE: NORMAL
ANTIBODY SCREEN: NORMAL
AORTIC VALVE CUSP SEPERATION: 0.64 CM
ASCENDING AORTA: 2.23 CM
AV INDEX (PROSTH): 0.49
AV MEAN GRADIENT: 13 MMHG
AV PEAK GRADIENT: 29 MMHG
AV VALVE AREA: 1.09 CM2
AV VELOCITY RATIO: 0.41
BSA FOR ECHO PROCEDURE: 1.69 M2
CK MB SERPL-MCNC: 0.97 NG/ML (ref 0–3.38)
CK SERPL-CCNC: 107 U/L (ref 30–135)
CV ECHO LV RWT: 0.41 CM
DOP CALC AO PEAK VEL: 2.68 M/S
DOP CALC AO VTI: 65.2 CM
DOP CALC LVOT AREA: 2.2 CM2
DOP CALC LVOT DIAMETER: 1.68 CM
DOP CALC LVOT PEAK VEL: 1.11 M/S
DOP CALC LVOT STROKE VOLUME: 71.12 CM3
DOP CALC MV VTI: 45.1 CM
DOP CALCLVOT PEAK VEL VTI: 32.1 CM
E WAVE DECELERATION TIME: 298.5 MSEC
E/A RATIO: 1.51
E/E' RATIO: 22.35 M/S
ECHO LV POSTERIOR WALL: 0.93 CM (ref 0.6–1.1)
EJECTION FRACTION: 55 %
ERYTHROCYTE [DISTWIDTH] IN BLOOD BY AUTOMATED COUNT: 16.8 % (ref 11–14.5)
FRACTIONAL SHORTENING: 8 % (ref 28–44)
HCT VFR BLD AUTO: 28.2 % (ref 36–48)
HGB BLD-MCNC: 9.5 G/DL (ref 11.8–16)
INTERVENTRICULAR SEPTUM: 0.93 CM (ref 0.6–1.1)
IVC DIAMETER: 2.23 CM
LEFT ATRIUM SIZE: 3.74 CM
LEFT ATRIUM VOLUME INDEX MOD: 30.2 ML/M2
LEFT ATRIUM VOLUME MOD: 51.1 CM3
LEFT INTERNAL DIMENSION IN SYSTOLE: 4.13 CM (ref 2.1–4)
LEFT VENTRICLE DIASTOLIC VOLUME INDEX: 54.97 ML/M2
LEFT VENTRICLE DIASTOLIC VOLUME: 92.9 ML
LEFT VENTRICLE MASS INDEX: 82 G/M2
LEFT VENTRICLE SYSTOLIC VOLUME INDEX: 24.4 ML/M2
LEFT VENTRICLE SYSTOLIC VOLUME: 41.3 ML
LEFT VENTRICULAR INTERNAL DIMENSION IN DIASTOLE: 4.51 CM (ref 3.5–6)
LEFT VENTRICULAR MASS: 139.33 G
LV LATERAL E/E' RATIO: 19 M/S
LV SEPTAL E/E' RATIO: 27.14 M/S
LVOT MG: 3.1 MMHG
LVOT MV: 0.82 CM/S
MCH RBC QN AUTO: 28.4 PG (ref 27–34)
MCV RBC AUTO: 84.2 FL (ref 79–99)
MEAN CELL HEMOGLOBIN CONCENTRATION (OHS) G/DL: 33.7 G/DL (ref 31–37)
MV MEAN GRADIENT: 4 MMHG
MV PEAK A VEL: 1.26 M/S
MV PEAK E VEL: 1.9 M/S
MV PEAK GRADIENT: 12 MMHG
MV STENOSIS PRESSURE HALF TIME: 63 MS
MV VALVE AREA BY CONTINUITY EQUATION: 1.58 CM2
MV VALVE AREA P 1/2 METHOD: 3.49 CM2
NRBC BLD AUTO-RTO: 0 % (ref 0–1)
PISA TR MAX VEL: 3.02 M/S
PLATELET # BLD AUTO: 236 X10(3)/MCL (ref 140–371)
PMV BLD AUTO: 11.3 FL (ref 9.4–12.4)
RA PRESSURE: 15 MMHG
RBC # BLD AUTO: 3.35 X10(6)/MCL (ref 4–5.1)
TDI LATERAL: 0.1 M/S
TDI SEPTAL: 0.07 M/S
TDI: 0.09 M/S
TR MAX PG: 36 MMHG
TRICUSPID ANNULAR PLANE SYSTOLIC EXCURSION: 2.05 CM
TROPONIN I SERPL-MCNC: <0.012 NG/ML (ref 0–0.03)
TV REST PULMONARY ARTERY PRESSURE: 51 MMHG
WBC # SPEC AUTO: 3.9 X10(3)/MCL (ref 4–11.5)

## 2023-02-28 PROCEDURE — 86900 BLOOD TYPING SEROLOGIC ABO: CPT | Performed by: NURSE PRACTITIONER

## 2023-02-28 PROCEDURE — 94761 N-INVAS EAR/PLS OXIMETRY MLT: CPT

## 2023-02-28 PROCEDURE — 96372 THER/PROPH/DIAG INJ SC/IM: CPT | Performed by: INTERNAL MEDICINE

## 2023-02-28 PROCEDURE — 63600175 PHARM REV CODE 636 W HCPCS: Performed by: NURSE PRACTITIONER

## 2023-02-28 PROCEDURE — 25000003 PHARM REV CODE 250: Performed by: NURSE PRACTITIONER

## 2023-02-28 PROCEDURE — 63600175 PHARM REV CODE 636 W HCPCS: Performed by: INTERNAL MEDICINE

## 2023-02-28 PROCEDURE — 82550 ASSAY OF CK (CPK): CPT | Performed by: FAMILY MEDICINE

## 2023-02-28 PROCEDURE — 36415 COLL VENOUS BLD VENIPUNCTURE: CPT | Performed by: NURSE PRACTITIONER

## 2023-02-28 PROCEDURE — 84484 ASSAY OF TROPONIN QUANT: CPT | Performed by: FAMILY MEDICINE

## 2023-02-28 PROCEDURE — 25000003 PHARM REV CODE 250

## 2023-02-28 PROCEDURE — 36415 COLL VENOUS BLD VENIPUNCTURE: CPT | Performed by: INTERNAL MEDICINE

## 2023-02-28 PROCEDURE — 21400001 HC TELEMETRY ROOM

## 2023-02-28 PROCEDURE — 25000242 PHARM REV CODE 250 ALT 637 W/ HCPCS

## 2023-02-28 PROCEDURE — 82553 CREATINE MB FRACTION: CPT | Performed by: FAMILY MEDICINE

## 2023-02-28 PROCEDURE — 25000003 PHARM REV CODE 250: Performed by: FAMILY MEDICINE

## 2023-02-28 PROCEDURE — 85027 COMPLETE CBC AUTOMATED: CPT | Performed by: INTERNAL MEDICINE

## 2023-02-28 RX ORDER — ATORVASTATIN CALCIUM 10 MG/1
10 TABLET, FILM COATED ORAL DAILY
Status: DISCONTINUED | OUTPATIENT
Start: 2023-02-28 | End: 2023-03-08 | Stop reason: HOSPADM

## 2023-02-28 RX ORDER — NITROGLYCERIN 0.4 MG/1
0.4 TABLET SUBLINGUAL EVERY 5 MIN PRN
Status: DISCONTINUED | OUTPATIENT
Start: 2023-02-28 | End: 2023-03-08 | Stop reason: HOSPADM

## 2023-02-28 RX ORDER — ONDANSETRON 2 MG/ML
4 INJECTION INTRAMUSCULAR; INTRAVENOUS EVERY 8 HOURS PRN
Status: DISCONTINUED | OUTPATIENT
Start: 2023-02-28 | End: 2023-03-08 | Stop reason: HOSPADM

## 2023-02-28 RX ORDER — IRON,CARBONYL/ASCORBIC ACID 100-250 MG
1 TABLET ORAL DAILY
Status: DISCONTINUED | OUTPATIENT
Start: 2023-02-28 | End: 2023-03-08 | Stop reason: HOSPADM

## 2023-02-28 RX ORDER — RANOLAZINE 500 MG/1
500 TABLET, EXTENDED RELEASE ORAL 2 TIMES DAILY
Status: DISCONTINUED | OUTPATIENT
Start: 2023-02-28 | End: 2023-02-28

## 2023-02-28 RX ORDER — ENOXAPARIN SODIUM 100 MG/ML
60 INJECTION SUBCUTANEOUS EVERY 12 HOURS
Status: DISCONTINUED | OUTPATIENT
Start: 2023-02-28 | End: 2023-03-01

## 2023-02-28 RX ORDER — LABETALOL 100 MG/1
100 TABLET, FILM COATED ORAL 2 TIMES DAILY
Status: DISCONTINUED | OUTPATIENT
Start: 2023-02-28 | End: 2023-03-04

## 2023-02-28 RX ORDER — ENOXAPARIN SODIUM 100 MG/ML
40 INJECTION SUBCUTANEOUS EVERY 24 HOURS
Status: DISCONTINUED | OUTPATIENT
Start: 2023-02-28 | End: 2023-02-28

## 2023-02-28 RX ORDER — HYDROCODONE BITARTRATE AND ACETAMINOPHEN 5; 325 MG/1; MG/1
1 TABLET ORAL EVERY 6 HOURS PRN
Status: DISCONTINUED | OUTPATIENT
Start: 2023-02-28 | End: 2023-03-08 | Stop reason: HOSPADM

## 2023-02-28 RX ORDER — TALC
6 POWDER (GRAM) TOPICAL NIGHTLY PRN
Status: DISCONTINUED | OUTPATIENT
Start: 2023-02-28 | End: 2023-03-08 | Stop reason: HOSPADM

## 2023-02-28 RX ORDER — GABAPENTIN 300 MG/1
300 CAPSULE ORAL 3 TIMES DAILY
Status: DISCONTINUED | OUTPATIENT
Start: 2023-02-28 | End: 2023-03-07

## 2023-02-28 RX ORDER — FLUTICASONE PROPIONATE 50 MCG
1 SPRAY, SUSPENSION (ML) NASAL DAILY
Status: DISCONTINUED | OUTPATIENT
Start: 2023-02-28 | End: 2023-03-08 | Stop reason: HOSPADM

## 2023-02-28 RX ORDER — ASPIRIN 81 MG/1
81 TABLET ORAL DAILY
Status: DISCONTINUED | OUTPATIENT
Start: 2023-02-28 | End: 2023-03-08 | Stop reason: HOSPADM

## 2023-02-28 RX ORDER — RANOLAZINE 500 MG/1
1000 TABLET, EXTENDED RELEASE ORAL 2 TIMES DAILY
Status: DISCONTINUED | OUTPATIENT
Start: 2023-02-28 | End: 2023-03-08 | Stop reason: HOSPADM

## 2023-02-28 RX ORDER — AMLODIPINE BESYLATE 5 MG/1
5 TABLET ORAL DAILY
Status: DISCONTINUED | OUTPATIENT
Start: 2023-02-28 | End: 2023-03-03

## 2023-02-28 RX ORDER — HYDRALAZINE HYDROCHLORIDE 20 MG/ML
10 INJECTION INTRAMUSCULAR; INTRAVENOUS EVERY 6 HOURS PRN
Status: DISCONTINUED | OUTPATIENT
Start: 2023-02-28 | End: 2023-03-08 | Stop reason: HOSPADM

## 2023-02-28 RX ORDER — SODIUM CHLORIDE 0.9 % (FLUSH) 0.9 %
10 SYRINGE (ML) INJECTION
Status: DISCONTINUED | OUTPATIENT
Start: 2023-02-28 | End: 2023-03-08 | Stop reason: HOSPADM

## 2023-02-28 RX ORDER — DICYCLOMINE HYDROCHLORIDE 20 MG/1
20 TABLET ORAL EVERY 6 HOURS
Status: DISCONTINUED | OUTPATIENT
Start: 2023-02-28 | End: 2023-03-08 | Stop reason: HOSPADM

## 2023-02-28 RX ORDER — PANTOPRAZOLE SODIUM 40 MG/1
40 TABLET, DELAYED RELEASE ORAL DAILY
Status: DISCONTINUED | OUTPATIENT
Start: 2023-02-28 | End: 2023-03-08 | Stop reason: HOSPADM

## 2023-02-28 RX ORDER — ISOSORBIDE MONONITRATE 30 MG/1
60 TABLET, EXTENDED RELEASE ORAL DAILY
Status: DISCONTINUED | OUTPATIENT
Start: 2023-02-28 | End: 2023-03-08 | Stop reason: HOSPADM

## 2023-02-28 RX ORDER — LOSARTAN POTASSIUM 50 MG/1
50 TABLET ORAL DAILY
Status: DISCONTINUED | OUTPATIENT
Start: 2023-02-28 | End: 2023-03-03

## 2023-02-28 RX ORDER — ENOXAPARIN SODIUM 100 MG/ML
40 INJECTION SUBCUTANEOUS DAILY
Status: DISCONTINUED | OUTPATIENT
Start: 2023-02-28 | End: 2023-02-28

## 2023-02-28 RX ADMIN — FLUTICASONE PROPIONATE 50 MCG: 50 SPRAY, METERED NASAL at 09:02

## 2023-02-28 RX ADMIN — Medication 1 TABLET: at 09:02

## 2023-02-28 RX ADMIN — PANTOPRAZOLE SODIUM 40 MG: 40 TABLET, DELAYED RELEASE ORAL at 09:02

## 2023-02-28 RX ADMIN — ISOSORBIDE MONONITRATE 60 MG: 30 TABLET, EXTENDED RELEASE ORAL at 09:02

## 2023-02-28 RX ADMIN — AMLODIPINE BESYLATE 5 MG: 5 TABLET ORAL at 01:02

## 2023-02-28 RX ADMIN — Medication 2 DROP: at 09:02

## 2023-02-28 RX ADMIN — GABAPENTIN 300 MG: 300 CAPSULE ORAL at 02:02

## 2023-02-28 RX ADMIN — Medication 2 DROP: at 05:02

## 2023-02-28 RX ADMIN — ATORVASTATIN CALCIUM 10 MG: 10 TABLET, FILM COATED ORAL at 09:02

## 2023-02-28 RX ADMIN — DICYCLOMINE HYDROCHLORIDE 20 MG: 20 TABLET ORAL at 05:02

## 2023-02-28 RX ADMIN — GABAPENTIN 300 MG: 300 CAPSULE ORAL at 09:02

## 2023-02-28 RX ADMIN — GABAPENTIN 300 MG: 300 CAPSULE ORAL at 08:02

## 2023-02-28 RX ADMIN — DICYCLOMINE HYDROCHLORIDE 20 MG: 20 TABLET ORAL at 11:02

## 2023-02-28 RX ADMIN — LOSARTAN POTASSIUM 50 MG: 50 TABLET, FILM COATED ORAL at 09:02

## 2023-02-28 RX ADMIN — ASPIRIN 81 MG: 81 TABLET, COATED ORAL at 09:02

## 2023-02-28 RX ADMIN — LABETALOL HYDROCHLORIDE 100 MG: 100 TABLET, FILM COATED ORAL at 08:02

## 2023-02-28 RX ADMIN — LABETALOL HYDROCHLORIDE 100 MG: 100 TABLET, FILM COATED ORAL at 09:02

## 2023-02-28 RX ADMIN — ENOXAPARIN SODIUM 40 MG: 100 INJECTION SUBCUTANEOUS at 09:02

## 2023-02-28 RX ADMIN — ENOXAPARIN SODIUM 60 MG: 100 INJECTION SUBCUTANEOUS at 08:02

## 2023-02-28 RX ADMIN — HYDROCODONE BITARTRATE AND ACETAMINOPHEN 1 TABLET: 5; 325 TABLET ORAL at 01:02

## 2023-02-28 RX ADMIN — RANOLAZINE 1000 MG: 500 TABLET, EXTENDED RELEASE ORAL at 08:02

## 2023-02-28 RX ADMIN — Medication 2 DROP: at 01:02

## 2023-02-28 RX ADMIN — RANOLAZINE 500 MG: 500 TABLET, EXTENDED RELEASE ORAL at 09:02

## 2023-02-28 NOTE — SUBJECTIVE & OBJECTIVE
Interval History:      Review of Systems   Constitutional:  Negative for activity change, appetite change and fever.   Respiratory:  Positive for shortness of breath. Negative for chest tightness and wheezing.    Cardiovascular:  Positive for chest pain.   Gastrointestinal:  Negative for abdominal pain, constipation, diarrhea, nausea and vomiting.   Genitourinary:  Negative for dysuria.   Skin:  Negative for rash and wound.   Neurological:  Negative for tremors and headaches.   Objective:     Vital Signs (Most Recent):  Temp: 97.5 °F (36.4 °C) (02/28/23 1330)  Pulse: 67 (02/28/23 1330)  Resp: 18 (02/28/23 1330)  BP: 105/70 (02/28/23 1330)  SpO2: 98 % (02/28/23 1330) Vital Signs (24h Range):  Temp:  [96.5 °F (35.8 °C)-98 °F (36.7 °C)] 97.5 °F (36.4 °C)  Pulse:  [60-80] 67  Resp:  [14-20] 18  SpO2:  [97 %-100 %] 98 %  BP: (105-179)/(50-84) 105/70     Weight: 61.2 kg (135 lb)  Body mass index is 21.79 kg/m².    Intake/Output Summary (Last 24 hours) at 2/28/2023 1617  Last data filed at 2/28/2023 1309  Gross per 24 hour   Intake 120 ml   Output 2 ml   Net 118 ml      Physical Exam  Constitutional:       General: She is not in acute distress.     Appearance: Normal appearance. She is normal weight. She is not ill-appearing.   HENT:      Head: Normocephalic and atraumatic.   Eyes:      General: No scleral icterus.     Extraocular Movements: Extraocular movements intact.   Cardiovascular:      Rate and Rhythm: Normal rate and regular rhythm.      Pulses: Normal pulses.      Heart sounds: Normal heart sounds.   Pulmonary:      Effort: Pulmonary effort is normal.      Breath sounds: Normal breath sounds.   Abdominal:      General: Abdomen is flat. Bowel sounds are normal.      Palpations: Abdomen is soft.   Skin:     General: Skin is warm and dry.      Capillary Refill: Capillary refill takes less than 2 seconds.      Findings: No erythema, lesion or rash.   Neurological:      General: No focal deficit present.      Mental  Status: She is alert and oriented to person, place, and time.   Psychiatric:         Mood and Affect: Mood normal.         Behavior: Behavior normal.         Thought Content: Thought content normal.       Significant Labs: All pertinent labs within the past 24 hours have been reviewed.  CBC:   Recent Labs   Lab 02/27/23 2148 02/28/23  0637   WBC 4.4 3.9*   HGB 8.6* 9.5*   HCT 25.9* 28.2*    236     CMP:   Recent Labs   Lab 02/27/23 2148      K 4.2   CO2 24   BUN 19.0   CREATININE 0.95   CALCIUM 8.3*   ALBUMIN 3.6   BILITOT 0.2   ALKPHOS 43*   AST 23   ALT <4       Significant Imaging: I have reviewed all pertinent imaging results/findings within the past 24 hours.

## 2023-02-28 NOTE — PLAN OF CARE
02/28/23 1259   SO Message   Medicare Outpatient and Observation Notification regarding financial responsibility Given to patient/caregiver   Date SO was signed 02/28/23   Time SO was signed 1031

## 2023-02-28 NOTE — PLAN OF CARE
02/28/23 1028   Discharge Assessment   Assessment Type Discharge Planning Assessment   Confirmed/corrected address, phone number and insurance Yes   Confirmed Demographics Correct on Facesheet   Source of Information patient   When was your last doctors appointment? 02/22/23   Does patient/caregiver understand observation status Yes   Reason For Admission Aortic Stenosis   People in Home alone   Do you expect to return to your current living situation? Yes   Do you have help at home or someone to help you manage your care at home? Yes   Who are your caregiver(s) and their phone number(s)? Family and Sitter   Prior to hospitilization cognitive status: Alert/Oriented   Current cognitive status: Alert/Oriented   Walking or Climbing Stairs ambulation difficulty, requires equipment;stair climbing difficulty, assistance 1 person;transferring difficulty, assistance 1 person   Mobility Management Walker, Cane   Dressing/Bathing bathing difficulty, assistance 1 person;dressing difficulty, assistance 1 person   Dressing/Bathing Management Sitter to assist everyday   Equipment Currently Used at Home walker, rolling;walker, standard;cane, straight   Readmission within 30 days? No   Patient currently being followed by outpatient case management? No   Do you currently have service(s) that help you manage your care at home? No   Do you take prescription medications? Yes   Do you have prescription coverage? Yes   Coverage Medicare-Medicaid   Do you have any problems affording any of your prescribed medications? No   Is the patient taking medications as prescribed? yes   How do you get to doctors appointments? family or friend will provide   Are you on dialysis? No   Do you take coumadin? No   Discharge Plan A Home Health   Discharge Plan B Rehab   DME Needed Upon Discharge  none   Discharge Plan discussed with: Patient   Discharge Barriers Identified None   Physical Activity   On average, how many days per week do you engage in  moderate to strenuous exercise (like a brisk walk)? 0 days   On average, how many minutes do you engage in exercise at this level? 0 min   Financial Resource Strain   How hard is it for you to pay for the very basics like food, housing, medical care, and heating? Somewhat   Housing Stability   In the last 12 months, was there a time when you were not able to pay the mortgage or rent on time? N   In the last 12 months, how many places have you lived? 1   In the last 12 months, was there a time when you did not have a steady place to sleep or slept in a shelter (including now)? N   Transportation Needs   In the past 12 months, has lack of transportation kept you from medical appointments or from getting medications? no   In the past 12 months, has lack of transportation kept you from meetings, work, or from getting things needed for daily living? No   Food Insecurity   Within the past 12 months, you worried that your food would run out before you got the money to buy more. Sometimes   Within the past 12 months, the food you bought just didn't last and you didn't have money to get more. Sometimes   Stress   Do you feel stress - tense, restless, nervous, or anxious, or unable to sleep at night because your mind is troubled all the time - these days? To some exte   Social Connections   In a typical week, how many times do you talk on the phone with family, friends, or neighbors? More than 3   How often do you get together with friends or relatives? More than 3   How often do you attend Islam or Temple services? More than 4   Do you belong to any clubs or organizations such as Islam groups, unions, fraternal or athletic groups, or school groups? No   How often do you attend meetings of the clubs or organizations you belong to? Never   Are you , , , , never , or living with a partner?    Alcohol Use   Q1: How often do you have a drink containing alcohol? Never   Q2: How  many drinks containing alcohol do you have on a typical day when you are drinking? None   Q3: How often do you have six or more drinks on one occasion? Never

## 2023-02-28 NOTE — PROGRESS NOTES
[facility]/Surg  [facility] Medicine  Progress Note    Patient Name: Martha Caraballo  MRN: [id number]  Patient Class: OP- Observation   Admission Date: 2/27/2023  Length of Stay: 0 days  Attending Physician: Juan Khan MD  Primary Care Provider: Jordan Sanchez MD        Subjective:     Principal Problem:Chest pain        HPI:  Patient is a 89 y.o. female pt of Dr. Sanchez in ,  with a past medical history of moderate aortic stenosis, hypertension, hyperlipidemia, TIA, gastroesophageal reflux disease, osteoarthritis and coronary artery disease, unclear if she had any intervention in the past presents to the ER on account of left-sided chest pain over the past few days.    Patient has been at her usual state of health until about 6 days ago when she developed left-sided chest pain, pressure-like, about 5/10 in intensity, radiating to the left upper extremities with no aggravating or relieving factor.  She admits occasional shortness of breath.  No dizziness or loss of consciousness.  She decided to come to the ER for further evaluation.    At the ER, cardiac enzymes not remarkable.  Chest x-ray shows no acute findings      Overview/Hospital Course:  02/28/2023 spoke with Dr. Silva, wants therapeutic dose lovenox x 48 hrs, add amlodipine and increased ranexa.  Current cardiac enzymes are negative.  Pt does admit to some 2-3/10 left sided chest pain.      Interval History:      Review of Systems   Constitutional:  Negative for activity change, appetite change and fever.   Respiratory:  Positive for shortness of breath. Negative for chest tightness and wheezing.    Cardiovascular:  Positive for chest pain.   Gastrointestinal:  Negative for abdominal pain, constipation, diarrhea, nausea and vomiting.   Genitourinary:  Negative for dysuria.   Skin:  Negative for rash and wound.   Neurological:  Negative for tremors and headaches.   Objective:     Vital Signs (Most Recent):  Temp: 97.5 °F (36.4 °C)  (02/28/23 1330)  Pulse: 67 (02/28/23 1330)  Resp: 18 (02/28/23 1330)  BP: 105/70 (02/28/23 1330)  SpO2: 98 % (02/28/23 1330) Vital Signs (24h Range):  Temp:  [96.5 °F (35.8 °C)-98 °F (36.7 °C)] 97.5 °F (36.4 °C)  Pulse:  [60-80] 67  Resp:  [14-20] 18  SpO2:  [97 %-100 %] 98 %  BP: (105-179)/(50-84) 105/70     Weight: 61.2 kg (135 lb)  Body mass index is 21.79 kg/m².    Intake/Output Summary (Last 24 hours) at 2/28/2023 1617  Last data filed at 2/28/2023 1309  Gross per 24 hour   Intake 120 ml   Output 2 ml   Net 118 ml      Physical Exam  Constitutional:       General: She is not in acute distress.     Appearance: Normal appearance. She is normal weight. She is not ill-appearing.   HENT:      Head: Normocephalic and atraumatic.   Eyes:      General: No scleral icterus.     Extraocular Movements: Extraocular movements intact.   Cardiovascular:      Rate and Rhythm: Normal rate and regular rhythm.      Pulses: Normal pulses.      Heart sounds: Normal heart sounds.   Pulmonary:      Effort: Pulmonary effort is normal.      Breath sounds: Normal breath sounds.   Abdominal:      General: Abdomen is flat. Bowel sounds are normal.      Palpations: Abdomen is soft.   Skin:     General: Skin is warm and dry.      Capillary Refill: Capillary refill takes less than 2 seconds.      Findings: No erythema, lesion or rash.   Neurological:      General: No focal deficit present.      Mental Status: She is alert and oriented to person, place, and time.   Psychiatric:         Mood and Affect: Mood normal.         Behavior: Behavior normal.         Thought Content: Thought content normal.       Significant Labs: All pertinent labs within the past 24 hours have been reviewed.  CBC:   Recent Labs   Lab 02/27/23 2148 02/28/23  0637   WBC 4.4 3.9*   HGB 8.6* 9.5*   HCT 25.9* 28.2*    236     CMP:   Recent Labs   Lab 02/27/23 2148      K 4.2   CO2 24   BUN 19.0   CREATININE 0.95   CALCIUM 8.3*   ALBUMIN 3.6   BILITOT 0.2    ALKPHOS 43*   AST 23   ALT <4       Significant Imaging: I have reviewed all pertinent imaging results/findings within the past 24 hours.      Assessment/Plan:      * Chest pain  Trend cardiac enzymes      Coronary artery disease  CIS consulted and trend enzymes  Increase ranexa  Continue statin  lovenox  Continue amlodipine      Aortic valve stenosis  Trend cardiac enxymes  CIS following      Iron deficiency anemia   Will check iron studies, monitor H&H on high dose lovenox      HTN (hypertension)  Chronic, controlled.  Latest blood pressure and vitals reviewed-   Temp:  [96.5 °F (35.8 °C)-98 °F (36.7 °C)]   Pulse:  [60-80]   Resp:  [14-20]   BP: (105-179)/(50-84)   SpO2:  [97 %-100 %] .   Home meds for hypertension were reviewed and noted below.   Hypertension Medications             isosorbide mononitrate (IMDUR) 60 MG 24 hr tablet TAKE 1 TABLET BY MOUTH ONCE DAILY    labetaloL (NORMODYNE) 100 MG tablet TAKE 1 TABLET BY MOUTH TWICE A DAY    losartan (COZAAR) 50 MG tablet Take 50 mg by mouth once daily.    nitroGLYCERIN (NITROSTAT) 0.4 MG SL tablet Place 1 tablet (0.4 mg total) under the tongue every 5 (five) minutes as needed for Chest pain.          While in the hospital, will manage blood pressure as follows; Continue home antihypertensive regimen    Will utilize p.r.n. blood pressure medication only if patient's blood pressure greater than  180/110 and she develops symptoms such as worsening chest pain or shortness of breath.        HLD (hyperlipidemia)   Patient is chronically on statin.will continue for now. Monitor clinically. Last LDL was   Lab Results   Component Value Date    LDLCALC 66.8 11/09/2020              VTE Risk Mitigation (From admission, onward)         Ordered     enoxaparin injection 60 mg  Every 12 hours         02/28/23 1226     IP VTE HIGH RISK PATIENT  Once         02/28/23 0152     Place sequential compression device  Until discontinued         02/28/23 0152                Discharge  Planning   JAN: 3/3/2023     Code Status: Full Code   Is the patient medically ready for discharge?:     Reason for patient still in hospital (select all that apply): Patient trending condition and Treatment  Discharge Plan A: Home Health                  Maria Del Rosario Reagan MD  Department of Hospital Medicine   [facility]/Surg

## 2023-02-28 NOTE — CONSULTS
[facility]  Cardiology  Consult Note    Patient Name: Martha Caraballo  MRN: [id number]  Admission Date: 2/27/2023  Hospital Length of Stay: 0 days  Code Status: Full Code   Attending Provider: Juan Khan MD   Consulting Provider: Kali Silva MD  Primary Care Physician: Jordan Sanchez MD      Patient information was obtained from patient, ER records, and primary team.     Inpatient consult to Cardiology  Consult performed by: CLIFF Muller  Consult ordered by: Jason Dejesus MD      Subjective:     Chief Complaint:    Telecardiology Consult  Location: [facility] ER  Reason for consultation: Chest pain shortness of breath  Consulting provider: Dr Jason Mccann  Duration: Greater than 30 minutes including review of epic and OMS records, patient and provider interview    HPI:   89-year-old female with a history of coronary artery disease, aortic stenosis, hypertension, GERD, dyslipidemia.  She has a prior history of an LAD stent as well as a chronically occluded right coronary artery.  She had a hospitalization in September 2022 with chest pain.  Her last coronary angiogram was performed in June 2022 showing in-stent restenosis of her LAD at 30%, circumflex was normal providing collaterals to the RCA territory, and the RCA was chronically occluded at the ostium.  Her last clinic visit was a few days ago February 22.  There was an echocardiogram ordered to reevaluate her aortic stenosis.  She has had a history of GI bleeding in the past.    Telecardiology consult 2/27: She presents to the ER tonight with shortness of breath and chest discomfort she is unsure whether she may have passed out earlier today.  He states that her symptoms have become worse, she gets chest pain and shortness of breath with any type of activity.  Her EKG upon arrival showed a normal sinus rhythm with no acute changes.  Lab work shows she is mildly anemic at 8 and 25.  Her  troponin was less than 0.012.  BNP was mildly elevated at 515.    2/28: BP elevated, NAD, laying flat in bed, CE neg x 2. No EKG today. Tele SR 64. Echo results pending    Previous cardiac testing:  Results for orders placed in visit on 09/20/22    Echo    Narrative  Ht: 66in        Wt: 130lbs      BSA: 1.66m2      BMI: 20.98  HR: 62/min      BP: /           T:               Rhy:    Summary  * Normal LV size with normal function. The ejection fraction is 55-60%. There is no left ventricular  hypertrophy.  * Normal right ventricular size with normal function.  * Moderate AS present. CAMILA 1.04 cm2, DI 0.37, PV 2.34 m/s, MG 13 mmHg  * There is moderate (2-3+) mitral regurgitation.  * There is mild-moderate (1-2+) tricuspid regurgitation.  * PASP = 47 mmHg.    Interpretation Detail  Gen: Fair study quality.    LV: The left ventricle is normal in size with normal systolic function. The ejection fraction is 55-60%. The  LV global longitudinal strain is -18%. There is no left ventricular hypertrophy.    RV: The right ventricle is normal in size with normal function.    MV: The mitral valve is normal. There is moderate mitral regurgitation (). The mitral valve area is normal,  Valve area= 3.49 cm  and Mean grad= 3 mmHg. There is mild mitral annular calcification.    AV: The aortic valve is trileaflet. The aortic valve is moderately calcified. There is, Valve area(C.Eq.)= and  Mean Grad=12mmHg.    TV: The tricuspid valve is normal. There is mild to moderate tricuspid regurgitation.    Macie: The pericardium is normal.    PA: (PASP=47mmHg).    Ao: The aorta is normal.    Cava: There is less than 50% inspiratory collapse of the IVC.    Measurements  LV Walls                  LV Diastolic Function  IVSd:    0.86cm      ( 0.9)      E/A:       1.00     (Variable)  ILWd:    0.94cm      ( 0.9)      E/e' Med:  10.82    ( 15)  E/e' Lat:  9.05     ( 13)  DT:        199ms    (200 40)  LV Mass  Linear:  139g        ( 162)                 LV Outflow  LVMI:    84g/m2      ( 95)  Palmira:       1.80cm   ( 3.4)  Peak Todd:  86cm/s   ( 110)  LV Chamber             Peak Grad: 3mmHg    (<30)  Mean Grad: 1.80mmHg  LVIDd:   4.62cm      ( 5.22)     LVOT TVI:  23.6cm   (>18)  LVIDdI:  2.78cm/m2   ( 3.2)  LVIDs:   3.40cm      ( 3.48)  LVIDsI:  2.05cm/m2                        Aortic Valve  RWT:     0.41cm      ( 0.42)  Peak Todd:  236cm/s  ( 170)  Mean Todd:  160cm/s  LV Volumes             Peak Grad: 23mmHg  Mean Grad: 12mmHg   (<20)  EDV:     69mL        ( 106)      TVI:       60.4cm  EDVI:    41.6mL/m2   ( 61)       Todd Ratio: 0.36     (>0.50)  ESV:     35mL        ( 42)  ESVI:    21.1mL/m2   ( 24)  Mitral Valve  LV Systolic Function        Peak Todd:  165cm/s  (<30)  Peak Grad: 11mmHg  Mean Grad: 3mmHg    (<5)  SV:      34mL        ()    MV P 1/2:  63ms     (40-70)  CO:      2.1L/min    (4.0-8.0)   MVA:       3.49cm2  (>2.2)  CI:      1.3L/min/m2 (2.5-4)  FS:      26%         ( 45%)  EF:      55%         ( 54%)             Tricuspid Valve  GLS:     *18%  TR Grad:   32mmHg   (31)    RV Function  Pulmonary Pressure  TAPSE:   1.88cm      ( 1.7)  RAP:       15mmHg   (3-5)  PASP:      47mmHg   ( 30)  Left Atrium  AP:      3.7cm       ( 3.8)  Vol:     46.3mL      (22-52)  Vol Ind: 27.9mL/m2   ( 34)    Aorta  Root:    2.89cm      ( 3.3)    Cavae  IVC Palmira: 2.15cm      (<2.1)  Kali Silva MD  Electronically signed by Dr. Kali Silva on 09/19/2022 at 8:27 PM     [facility] ([facility] 6/2022)   MVCAD with patent stent in LAD and  of RCA    Past Medical History:   Diagnosis Date    Acid reflux     Arthritis     Carpal tunnel syndrome 9/25/2018    Cataract     Coronary artery disease 3/15/2019    Dry eyes     Heart murmur     Hyperlipidemia     Hypertension     Transient ischemic attack 5/20/2020       Past Surgical History:   Procedure Laterality Date    ADENOIDECTOMY      APPENDECTOMY      CHOLECYSTECTOMY      EYE SURGERY      HYSTERECTOMY      SPLENECTOMY, TOTAL       TONSILLECTOMY         Review of patient's allergies indicates:   Allergen Reactions    Alendronate sodium Anaphylaxis    Pentazocine Anaphylaxis    Risedronate sodium Anaphylaxis    Codeine      Nausea/vomiting    Iodinated contrast media     Iodine     Oxycodone hcl-oxycodone-asa      Nausea/vomiting    Sulfa (sulfonamide antibiotics)        No current facility-administered medications on file prior to encounter.     Current Outpatient Medications on File Prior to Encounter   Medication Sig    aspirin (ECOTRIN) 81 MG EC tablet Take 81 mg by mouth once daily. OTC    ferrous fumarate-folic acid 324 mg, 106mg iron,-1mg (HEMATINIC/FOLIC ACID) Tab TAKE 1 TABLET BY MOUTH EVERY DAY    gabapentin (NEURONTIN) 300 MG capsule Take 1 capsule (300 mg total) by mouth 3 (three) times daily.    isosorbide mononitrate (IMDUR) 60 MG 24 hr tablet TAKE 1 TABLET BY MOUTH ONCE DAILY    labetaloL (NORMODYNE) 100 MG tablet TAKE 1 TABLET BY MOUTH TWICE A DAY    linaCLOtide (LINZESS) 72 mcg Cap capsule Take 72 mcg by mouth before breakfast.    losartan (COZAAR) 50 MG tablet Take 50 mg by mouth once daily.    pantoprazole (PROTONIX) 40 MG tablet Take 40 mg by mouth once daily.    ranolazine (RANEXA) 500 MG Tb12 Take 1 tablet (500 mg total) by mouth 2 (two) times daily.    cycloSPORINE (RESTASIS) 0.05 % ophthalmic emulsion Restasis 0.05 % eye drops in a dropperette    dicyclomine (BENTYL) 20 mg tablet Take 20 mg by mouth every 6 (six) hours.    fluticasone propionate (FLONASE) 50 mcg/actuation nasal spray 1 SPRAY INTO EACH NOSTRIL ONCE DAILY.    HYDROcodone-acetaminophen (NORCO) 5-325 mg per tablet Take 1 tablet by mouth every 6 (six) hours as needed for Pain.    nitroGLYCERIN (NITROSTAT) 0.4 MG SL tablet Place 1 tablet (0.4 mg total) under the tongue every 5 (five) minutes as needed for Chest pain.    rosuvastatin (CRESTOR) 5 MG tablet Take 5 mg by mouth once daily.     Family History       Family history is unknown by patient.           Tobacco Use    Smoking status: Never    Smokeless tobacco: Never   Substance and Sexual Activity    Alcohol use: No    Drug use: No    Sexual activity: Not on file     Review of Systems   Constitutional: Positive for malaise/fatigue.   HENT: Negative.     Eyes: Negative.    Cardiovascular:  Positive for chest pain, dyspnea on exertion and syncope.   Respiratory:  Positive for shortness of breath.    Endocrine: Negative.    Musculoskeletal:  Positive for joint pain.   Gastrointestinal: Negative.    Genitourinary: Negative.    Psychiatric/Behavioral: Negative.     Objective:     Vital Signs (Most Recent):  Temp: 97.5 °F (36.4 °C) (02/28/23 1330)  Pulse: 67 (02/28/23 1330)  Resp: 18 (02/28/23 1330)  BP: 105/70 (02/28/23 1330)  SpO2: 98 % (02/28/23 1330)   Vital Signs (24h Range):  Temp:  [96.5 °F (35.8 °C)-98 °F (36.7 °C)] 97.5 °F (36.4 °C)  Pulse:  [60-80] 67  Resp:  [14-20] 18  SpO2:  [97 %-100 %] 98 %  BP: (105-179)/(50-84) 105/70     Weight: 61.2 kg (135 lb)  Body mass index is 21.79 kg/m².    SpO2: 98 %         Intake/Output Summary (Last 24 hours) at 2/28/2023 1541  Last data filed at 2/28/2023 1309  Gross per 24 hour   Intake 120 ml   Output 2 ml   Net 118 ml       Lines/Drains/Airways       Peripheral Intravenous Line  Duration                  Peripheral IV - Single Lumen 20 G Anterior;Left Forearm -- days                    Physical Exam  Constitutional:       Appearance: Normal appearance. She is normal weight.   HENT:      Head: Normocephalic.      Nose: Nose normal.      Mouth/Throat:      Mouth: Mucous membranes are moist.   Eyes:      Pupils: Pupils are equal, round, and reactive to light.   Cardiovascular:      Rate and Rhythm: Normal rate and regular rhythm.      Heart sounds: Murmur heard.   Pulmonary:      Effort: Pulmonary effort is normal.      Breath sounds: Normal breath sounds.   Abdominal:      General: Abdomen is flat. Bowel sounds are normal.   Musculoskeletal:         General: Normal  range of motion.      Cervical back: Normal range of motion.   Skin:     General: Skin is warm and dry.   Neurological:      General: No focal deficit present.      Mental Status: She is alert. Mental status is at baseline.   Psychiatric:         Mood and Affect: Mood normal.       Significant Labs:   Recent Lab Results         02/28/23  1152   02/28/23  0637   02/28/23  0021   02/27/23  2148        Albumin/Globulin Ratio       1.2       ABO and RH     A POS   A POS       Albumin       3.6       Alkaline Phosphatase       43       ALT       <4       Anion Gap       6.0       Antibody Screen     NEG         AST       23       Baso #       0.06       Basophil %       1.4       BILIRUBIN TOTAL       0.2       BUN       19.0       BUN/CREAT RATIO       20       Calcium       8.3       Chloride       110       CO2       24       CPK   107           CPK MB   0.97           Creatinine       0.95       eGFR       56  Comment:                      EGFR INTERPRETATION    Beginning 8/15/22 we are reporting the eGFRcr calculation as recommended by the National Kid[employer]. The eGFRcr equation has similar overall performance characteristics to the older equation, but the values may differ by more than 10% particularly at higher values of eGFRcr and younger adult ages.    NKF stages of chronic kidney disease (CKD)  Stage 1: Kidney damage with normal or increased eGFR (>90 mL/min/1.73 m^2)  Stage 2: Mild reduction in GFR (60-89 mL/min/1.73 m^2)  Stage 3a: Moderate reduction in GFR (45-59 mL/min/1.73 m^2)  Stage 3b: Moderate reduction in GFR (30-44 mL/min/1.73 m^2)  Stage 4: Severe reduction in GFR (15-29 mL/min/1.73 m^2)  Stage 5: Kidney failure (GFR <15 mL/min/1.73 m^2)           Eos #       0.18       Eosinophil %       4.1       Globulin, Total       3.0       Glucose       106       Hematocrit   28.2     25.9       Hemoglobin   9.5     8.6       Immature Grans (Abs)       0.01       Immature Granulocytes       0.2        Lymph #       1.25       LYMPH %       28.2       MCH   28.4     27.8       MCHC   33.7     33.2       MCV   84.2     83.8       Mono #       0.57       Mono %       12.9       MPV   11.3     11.7       Neut #       2.36       Neut %       53.2       nRBC   0.0     0.0       Platelets   236     236       Potassium       4.2       ProBNP       515       PROTEIN TOTAL       6.6       RBC   3.35     3.09       RDW   16.8     16.6       Sodium       140       Troponin I <0.012       <0.012       WBC   3.9     4.4               Significant Imaging:     Assessment and Plan:     Impression:  Chest pain with shortness of breath  -Negative EKG and cardiac biomarkers    Coronary artery disease  -Previous stenting of the LAD and known chronically occluded RCA  - escalate GDMT    Valvular heart disease with aortic stenosis  -echo results pending    Hypertension  Dyslipidemia  - LDL at goal on Simvastatin 20    History of GI bleed  -  8/25    Plan:  Increase Lovenox to 60 mg bid for 48 hours  Increase Ranexa to 1000 mg bid  Start Norvasc 5 mg daily  Continue aspirin, isosorbide and labetalol  Monitor Bp; if decreased can then consider stopping Losartan if needed  If remains pain free and CE negative can follow up in clinic and consider OP Nuclear to assess for inferior ischemia given  of RCA but intervention would be high risk especially given age.        Active Diagnoses:    Diagnosis Date Noted POA    Aortic valve stenosis [I35.0] 02/27/2023 Unknown      Problems Resolved During this Admission:         VTE Risk Mitigation (From admission, onward)           Ordered     enoxaparin injection 60 mg  Every 12 hours         02/28/23 1226     IP VTE HIGH RISK PATIENT  Once         02/28/23 0152     Place sequential compression device  Until discontinued         02/28/23 0152                Patient was independently seen by me and MDM was performed by me, Kali Silva MD     Thank you for your consult.     Kali Silva,  MD  Cardiology   [facility]gency [facility]

## 2023-02-28 NOTE — ED NOTES
BIBA with CP since Wed; seen in cards clinic same day. Associated l upper back pain w/LUE numbness and tingling. Denies N/V/SOB.

## 2023-02-28 NOTE — ASSESSMENT & PLAN NOTE
Chronic, controlled.  Latest blood pressure and vitals reviewed-   Temp:  [96.5 °F (35.8 °C)-98 °F (36.7 °C)]   Pulse:  [60-80]   Resp:  [14-20]   BP: (105-179)/(50-84)   SpO2:  [97 %-100 %] .   Home meds for hypertension were reviewed and noted below.   Hypertension Medications             isosorbide mononitrate (IMDUR) 60 MG 24 hr tablet TAKE 1 TABLET BY MOUTH ONCE DAILY    labetaloL (NORMODYNE) 100 MG tablet TAKE 1 TABLET BY MOUTH TWICE A DAY    losartan (COZAAR) 50 MG tablet Take 50 mg by mouth once daily.    nitroGLYCERIN (NITROSTAT) 0.4 MG SL tablet Place 1 tablet (0.4 mg total) under the tongue every 5 (five) minutes as needed for Chest pain.          While in the hospital, will manage blood pressure as follows; Continue home antihypertensive regimen    Will utilize p.r.n. blood pressure medication only if patient's blood pressure greater than  180/110 and she develops symptoms such as worsening chest pain or shortness of breath.

## 2023-02-28 NOTE — HPI
Patient is a 89 y.o. female pt of Dr. Sanchez in ,  with a past medical history of moderate aortic stenosis, hypertension, hyperlipidemia, TIA, gastroesophageal reflux disease, osteoarthritis and coronary artery disease, unclear if she had any intervention in the past presents to the ER on account of left-sided chest pain over the past few days.    Patient has been at her usual state of health until about 6 days ago when she developed left-sided chest pain, pressure-like, about 5/10 in intensity, radiating to the left upper extremities with no aggravating or relieving factor.  She admits occasional shortness of breath.  No dizziness or loss of consciousness.  She decided to come to the ER for further evaluation.    At the ER, cardiac enzymes not remarkable.  Chest x-ray shows no acute findings

## 2023-02-28 NOTE — ASSESSMENT & PLAN NOTE
Patient is chronically on statin.will continue for now. Monitor clinically. Last LDL was   Lab Results   Component Value Date    LDLCALC 66.8 11/09/2020

## 2023-02-28 NOTE — ED PROVIDER NOTES
Encounter Date: 2/27/2023       History     Chief Complaint   Patient presents with    Chest Pain     Started Wed. Seen by cards on Wed.     89-year-old female complains of chest pain for the past couple of weeks.  She is known to have significant coronary artery disease, including an obstructed stent, as well as AS.  No intervention has been done due to patient's age and comorbidity of recurrent GI bleeds.  She saw Dr. Silva recently, and he ordered a transthoracic echo.  She presents because the pain and dyspnea on exertion have become unbearable.  She is unable to move around the house due to the pain and shortness of breath.  And, she thinks she suffered a syncopal episode earlier today.    The history is provided by the patient and a relative.   Review of patient's allergies indicates:   Allergen Reactions    Alendronate sodium Anaphylaxis    Pentazocine Anaphylaxis    Risedronate sodium Anaphylaxis    Codeine      Nausea/vomiting    Iodinated contrast media     Iodine     Oxycodone hcl-oxycodone-asa      Nausea/vomiting    Sulfa (sulfonamide antibiotics)      Past Medical History:   Diagnosis Date    Acid reflux     Arthritis     Carpal tunnel syndrome 9/25/2018    Cataract     Coronary artery disease 3/15/2019    Dry eyes     Heart murmur     Hyperlipidemia     Hypertension     Transient ischemic attack 5/20/2020     Past Surgical History:   Procedure Laterality Date    ADENOIDECTOMY      APPENDECTOMY      CHOLECYSTECTOMY      EYE SURGERY      HYSTERECTOMY      SPLENECTOMY, TOTAL      TONSILLECTOMY       Family History   Family history unknown: Yes     Social History     Tobacco Use    Smoking status: Never    Smokeless tobacco: Never   Substance Use Topics    Alcohol use: No    Drug use: No     Review of Systems   Respiratory:  Positive for shortness of breath.    Cardiovascular:  Positive for chest pain.   All other systems reviewed and are negative.    Physical Exam     Initial Vitals [02/27/23 2125]   BP  Pulse Resp Temp SpO2   (!) 165/84 67 14 98 °F (36.7 °C) 98 %      MAP       --         Physical Exam    Nursing note and vitals reviewed.  Constitutional: Vital signs are normal. She appears well-developed and well-nourished. She is cooperative.   Elderly female in no apparent distress.  Looks well.  Looks younger than stated age.   HENT:   Head: Normocephalic and atraumatic.   Eyes: Conjunctivae, EOM and lids are normal. Pupils are equal, round, and reactive to light.   Neck: Trachea normal. Neck supple.   Normal range of motion.  Cardiovascular:  Normal rate, regular rhythm, normal heart sounds and intact distal pulses.           Pulmonary/Chest: Breath sounds normal.   Abdominal: Abdomen is soft. Bowel sounds are normal.   Musculoskeletal:         General: Normal range of motion.      Cervical back: Normal, normal range of motion and neck supple.      Lumbar back: Normal.     Neurological: She is alert and oriented to person, place, and time. She has normal strength. Coordination normal.   Skin: Skin is warm, dry and intact. Capillary refill takes less than 2 seconds.   Psychiatric: She has a normal mood and affect. Her speech is normal and behavior is normal. Judgment and thought content normal. Cognition and memory are normal.       ED Course   Procedures  Labs Reviewed   COMPREHENSIVE METABOLIC PANEL - Abnormal; Notable for the following components:       Result Value    Calcium Level Total 8.3 (*)     Alkaline Phosphatase 43 (*)     All other components within normal limits   NT-PRO NATRIURETIC PEPTIDE - Abnormal; Notable for the following components:    ProBNP 515 (*)     All other components within normal limits   CBC WITH DIFFERENTIAL - Abnormal; Notable for the following components:    RBC 3.09 (*)     Hgb 8.6 (*)     Hct 25.9 (*)     RDW 16.6 (*)     Mono % 12.9 (*)     Basophil % 1.4 (*)     Mono # 0.57 (*)     All other components within normal limits   TROPONIN I - Normal   CBC W/ AUTO DIFFERENTIAL     Narrative:     The following orders were created for panel order CBC auto differential.  Procedure                               Abnormality         Status                     ---------                               -----------         ------                     CBC with Differential[[id number]]        Abnormal            Final result                 Please view results for these tests on the individual orders.   TYPE & SCREEN        ECG Results              EKG 12-lead (Preliminary result)  Result time 02/27/23 22:23:47      Wet Read by Jason Dejesus MD (02/27/23 22:23:47, [facility], Emergency Medicine)    I had to read this EKG off the monitor, as they are having difficulty uploading it to the chart.  Normal sinus rhythm, heart rate 69, normal intervals, normal axis, normal P waves, normal QRS, normal ST segments, normal T-waves.                                  Imaging Results              X-Ray Chest AP Portable (Preliminary result)  Result time 02/27/23 21:55:56      Wet Read by Jason Dejesus MD (02/27/23 21:55:56, [facility], Emergency Medicine)    Chest x-ray appears normal for a 89-year-old.                                     Medications   HYDROcodone-acetaminophen 5-325 mg per tablet 1 tablet (1 tablet Oral Given 2/27/23 2348)   ALPRAZolam tablet 0.5 mg (0.5 mg Oral Given 2/27/23 2347)     Medical Decision Making:   Initial Assessment:   Worsening angina.  Clinical Tests:   Lab Tests: Ordered  Radiological Study: Ordered  Medical Tests: Ordered  ED Management:  Cardiac workup  CIS consult  Pt recently ran out of her pain meds.   In ED, she has c/o pain in several locations (hand, back, shoulders).    CIS recommends keeping pt overnight, and consult Dr. Silva tomorrow.                        Clinical Impression:   Final diagnoses:  [R07.9] Chest pain  [R07.9] Chest pain, unspecified type (Primary)  [I35.0] Aortic valve stenosis, etiology of  cardiac valve disease unspecified  [I25.10] Coronary artery disease, unspecified vessel or lesion type, unspecified whether angina present, unspecified whether native or transplanted heart  [I35.0] Aortic stenosis        ED Disposition Condition    Observation Stable                Jason Dejesus MD  02/28/23 0054

## 2023-02-28 NOTE — HOSPITAL COURSE
2023 spoke with Dr. Silva, wants therapeutic dose lovenox x 48 hrs, add amlodipine and increased ranexa.  Current cardiac enzymes are negative.  Pt does admit to some 2-3/10 left sided chest pain.  2023 troponin have been negative, no chest pain reproducible component left shoulder pain  2023 troponin negative, no further chest pain, pt had episode of dizziness and confusion associated with transient low BP when she got up at 0300 am overnight to ambulate to the bathroom, called on call telemed and gave 500cc bolus of ivf and her symtpoms have imrpoved. No symptoms at present and she is back to her baseline mental status.  2023 pt still experiencing some fluctuations in BP.  Orthostatic vitals   Llyin/58  Sittin/57  Standin/49  spoke with DR. Silva recommends continue imdur and labetolol d/c losartan and amlodipine  2023 patient still with orthostatic hypotension despite discontinuing the losartan and amlodipine.  He is very weak having a hard time even going to the bathroom and doing activities of daily living.  I will put labetalol on hold consult Physical therapy consult for [facility] rehab placement  2023 since discontinuing the labetalol her orthostatic hypotension has resolved.  She is tolerating physical therapy but remains very weak.  Plans are for her to go to rehab tomorrow.  2023 pt awake and alert, c/o pain on feet on the bottom very tender and hypersensitive, difficulty walking due to severe burning pain, s tarted yesterday.  2023 feel better today, BP improved this AM

## 2023-02-28 NOTE — CONSULTS
[facility]  Cardiology  Consult Note    Patient Name: Martha Caraballo  MRN: [id number]  Admission Date: 2/27/2023  Hospital Length of Stay: 0 days  Code Status: No Order   Attending Provider: Jason Dejesus MD   Consulting Provider: Rowdy Fraser NP  Primary Care Physician: Jordan Sanchez MD  Principal Problem:<principal problem not specified>    Patient information was obtained from patient, ER records, and primary team.     Consults  Subjective:     Chief Complaint:    Telecardiology Consult  Location: [facility] ER  Reason for consultation: Chest pain shortness of breath  Consulting provider: Dr Jason Mccann  Duration: Greater than 30 minutes including review of epic and OMS records, patient and provider interview    HPI:   89-year-old female with a history of coronary artery disease, aortic stenosis, hypertension, GERD, dyslipidemia.  She has a prior history of an LAD stent as well as a chronically occluded right coronary artery.  She had a hospitalization in September 2022 with chest pain.  Her last coronary angiogram was performed in June 2022 showing in-stent restenosis of her LAD at 30%, circumflex was normal providing collaterals to the RCA territory, and the RCA was chronically occluded at the ostium.  Her last clinic visit was a few days ago February 22.  There was an echocardiogram ordered to reevaluate her aortic stenosis.  She has had a history of GI bleeding in the past.    She presents to the ER tonight with shortness of breath and chest discomfort she is unsure whether she may have passed out earlier today.  He states that her symptoms have become worse, she gets chest pain and shortness of breath with any type of activity.  Her EKG upon arrival showed a normal sinus rhythm with no acute changes.  Lab work shows she is mildly anemic at 8 and 25.  Her troponin was less than 0.012.  BNP was mildly elevated at 515.    Past Medical History:    Diagnosis Date    Acid reflux     Arthritis     Carpal tunnel syndrome 9/25/2018    Cataract     Coronary artery disease 3/15/2019    Dry eyes     Heart murmur     Hyperlipidemia     Hypertension     Transient ischemic attack 5/20/2020       Past Surgical History:   Procedure Laterality Date    ADENOIDECTOMY      APPENDECTOMY      CHOLECYSTECTOMY      EYE SURGERY      HYSTERECTOMY      SPLENECTOMY, TOTAL      TONSILLECTOMY         Review of patient's allergies indicates:   Allergen Reactions    Alendronate sodium Anaphylaxis    Pentazocine Anaphylaxis    Risedronate sodium Anaphylaxis    Codeine      Nausea/vomiting    Iodinated contrast media     Iodine     Oxycodone hcl-oxycodone-asa      Nausea/vomiting    Sulfa (sulfonamide antibiotics)        No current facility-administered medications on file prior to encounter.     Current Outpatient Medications on File Prior to Encounter   Medication Sig    aspirin (ECOTRIN) 81 MG EC tablet Take 81 mg by mouth once daily. OTC    ferrous fumarate-folic acid 324 mg, 106mg iron,-1mg (HEMATINIC/FOLIC ACID) Tab TAKE 1 TABLET BY MOUTH EVERY DAY    gabapentin (NEURONTIN) 300 MG capsule Take 1 capsule (300 mg total) by mouth 3 (three) times daily.    isosorbide mononitrate (IMDUR) 60 MG 24 hr tablet TAKE 1 TABLET BY MOUTH ONCE DAILY    labetaloL (NORMODYNE) 100 MG tablet TAKE 1 TABLET BY MOUTH TWICE A DAY    linaCLOtide (LINZESS) 72 mcg Cap capsule Take 72 mcg by mouth before breakfast.    losartan (COZAAR) 50 MG tablet Take 50 mg by mouth once daily.    pantoprazole (PROTONIX) 40 MG tablet Take 40 mg by mouth once daily.    ranolazine (RANEXA) 500 MG Tb12 Take 1 tablet (500 mg total) by mouth 2 (two) times daily.    cycloSPORINE (RESTASIS) 0.05 % ophthalmic emulsion Restasis 0.05 % eye drops in a dropperette    dicyclomine (BENTYL) 20 mg tablet Take 20 mg by mouth every 6 (six) hours.    fluticasone propionate (FLONASE) 50 mcg/actuation nasal spray 1 SPRAY INTO EACH NOSTRIL ONCE  DAILY.    HYDROcodone-acetaminophen (NORCO) 5-325 mg per tablet Take 1 tablet by mouth every 6 (six) hours as needed for Pain.    nitroGLYCERIN (NITROSTAT) 0.4 MG SL tablet Place 1 tablet (0.4 mg total) under the tongue every 5 (five) minutes as needed for Chest pain.    rosuvastatin (CRESTOR) 5 MG tablet Take 5 mg by mouth once daily.     Family History       Family history is unknown by patient.          Tobacco Use    Smoking status: Never    Smokeless tobacco: Never   Substance and Sexual Activity    Alcohol use: No    Drug use: No    Sexual activity: Not on file     Review of Systems   Constitutional: Positive for malaise/fatigue.   HENT: Negative.     Eyes: Negative.    Cardiovascular:  Positive for chest pain, dyspnea on exertion and syncope.   Respiratory:  Positive for shortness of breath.    Endocrine: Negative.    Musculoskeletal:  Positive for joint pain.   Gastrointestinal: Negative.    Genitourinary: Negative.    Psychiatric/Behavioral: Negative.     Objective:     Vital Signs (Most Recent):  Pulse: 67 (02/27/23 2125)  Resp: 14 (02/27/23 2125)  BP: (!) 165/84 (02/27/23 2125)  SpO2: 98 % (02/27/23 2125)   Vital Signs (24h Range):  Pulse:  [67] 67  Resp:  [14] 14  SpO2:  [98 %] 98 %  BP: (165)/(84) 165/84     Weight: 61.2 kg (135 lb)  Body mass index is 21.79 kg/m².    SpO2: 98 %       No intake or output data in the 24 hours ending 02/27/23 2335    Lines/Drains/Airways       Peripheral Intravenous Line  Duration                  Peripheral IV - Single Lumen 20 G Anterior;Left Forearm -- days                    Physical Exam  Constitutional:       Appearance: Normal appearance. She is normal weight.   HENT:      Head: Normocephalic.      Nose: Nose normal.      Mouth/Throat:      Mouth: Mucous membranes are moist.   Eyes:      Pupils: Pupils are equal, round, and reactive to light.   Cardiovascular:      Rate and Rhythm: Normal rate and regular rhythm.      Heart sounds: Murmur heard.   Pulmonary:       Effort: Pulmonary effort is normal.      Breath sounds: Normal breath sounds.   Abdominal:      General: Abdomen is flat. Bowel sounds are normal.   Musculoskeletal:         General: Normal range of motion.      Cervical back: Normal range of motion.   Skin:     General: Skin is warm and dry.   Neurological:      General: No focal deficit present.      Mental Status: She is alert. Mental status is at baseline.   Psychiatric:         Mood and Affect: Mood normal.       Significant Labs:   Recent Lab Results         02/27/23  2148        Albumin/Globulin Ratio 1.2       Albumin 3.6       Alkaline Phosphatase 43       ALT <4       Anion Gap 6.0       AST 23       Baso # 0.06       Basophil % 1.4       BILIRUBIN TOTAL 0.2       BUN 19.0       BUN/CREAT RATIO 20       Calcium 8.3       Chloride 110       CO2 24       Creatinine 0.95       eGFR 56  Comment:                      EGFR INTERPRETATION    Beginning 8/15/22 we are reporting the eGFRcr calculation as recommended by the [employer]. The eGFRcr equation has similar overall performance characteristics to the older equation, but the values may differ by more than 10% particularly at higher values of eGFRcr and younger adult ages.    NKF stages of chronic kidney disease (CKD)  Stage 1: Kidney damage with normal or increased eGFR (>90 mL/min/1.73 m^2)  Stage 2: Mild reduction in GFR (60-89 mL/min/1.73 m^2)  Stage 3a: Moderate reduction in GFR (45-59 mL/min/1.73 m^2)  Stage 3b: Moderate reduction in GFR (30-44 mL/min/1.73 m^2)  Stage 4: Severe reduction in GFR (15-29 mL/min/1.73 m^2)  Stage 5: Kidney failure (GFR <15 mL/min/1.73 m^2)           Eos # 0.18       Eosinophil % 4.1       Globulin, Total 3.0       Glucose 106       Hematocrit 25.9       Hemoglobin 8.6       Immature Grans (Abs) 0.01       Immature Granulocytes 0.2       Lymph # 1.25       LYMPH % 28.2       MCH 27.8       MCHC 33.2       MCV 83.8       Mono # 0.57       Mono % 12.9       MPV  11.7       Neut # 2.36       Neut % 53.2       nRBC 0.0       Platelets 236       Potassium 4.2       ProBNP 515       PROTEIN TOTAL 6.6       RBC 3.09       RDW 16.6       Sodium 140       Troponin I <0.012       WBC 4.4               Significant Imaging:     Assessment and Plan:     Impression:  Chest pain with shortness of breath  -Negative EKG and cardiac biomarkers  Coronary artery disease  -Previous stenting of the LAD and known chronically occluded RCA  Valvular heart disease with significant aortic stenosis  -Transthoracic echocardiogram initially scheduled for April 5  Hypertension  Dyslipidemia  - LDL at goal on Simvastatin 20  History of GI bleed  -  8/25    Plan:  She should be admitted for observation tonight  Continue to cycle her cardiac biomarkers  Telemetry monitoring  Labs in the morning including CBC  Continue aspirin, isosorbide and labetalol  Echocardiogram in the a.m. to evaluate her aortic valve  Avoid diuresis at this time due to her significant AS  Dr. Silva will see her tomorrow afternoon    Thank you for the consultation should you have any questions or concerns please do not hesitate to call        There are no hospital problems to display for this patient.      VTE Risk Mitigation (From admission, onward)      None            Thank you for your consult.     Rowdy Fraser NP  Cardiology   [facility]

## 2023-02-28 NOTE — H&P
Chief Complaint; left-sided chest pain over the past few days    HPI:   Patient is a 89 y.o. female with a medical history of moderate aortic stenosis, hypertension, hyperlipidemia, TIA, gastroesophageal reflux disease, osteoarthritis and coronary artery disease, unclear if she had any intervention in the past presents to the ER on account of left-sided chest pain over the past few days.    Patient has been at her usual state of health until about 6 days ago when she developed left-sided chest pain, pressure-like, about 5/10 in intensity, radiating to the left upper extremities with no aggravating or relieving factor.  She admits occasional shortness of breath.  No dizziness or loss of consciousness.  She decided to come to the ER for further evaluation.    At the ER, cardiac enzymes not remarkable.  Chest x-ray shows no acute findings    PCP Jordan Sanchez MD MD        Past Medical History:   Diagnosis Date    Acid reflux     Arthritis     Carpal tunnel syndrome 9/25/2018    Cataract     Coronary artery disease 3/15/2019    Dry eyes     Heart murmur     Hyperlipidemia     Hypertension     Transient ischemic attack 5/20/2020        Past Surgical History:   Procedure Laterality Date    ADENOIDECTOMY      APPENDECTOMY      CHOLECYSTECTOMY      EYE SURGERY      HYSTERECTOMY      SPLENECTOMY, TOTAL      TONSILLECTOMY          Medications Prior to Admission   Medication Sig Dispense Refill Last Dose    aspirin (ECOTRIN) 81 MG EC tablet Take 81 mg by mouth once daily. OTC   2/27/2023    ferrous fumarate-folic acid 324 mg, 106mg iron,-1mg (HEMATINIC/FOLIC ACID) Tab TAKE 1 TABLET BY MOUTH EVERY DAY 90 tablet 3 2/27/2023    gabapentin (NEURONTIN) 300 MG capsule Take 1 capsule (300 mg total) by mouth 3 (three) times daily. 90 capsule 1 2/27/2023    isosorbide mononitrate (IMDUR) 60 MG 24 hr tablet TAKE 1 TABLET BY MOUTH ONCE DAILY 90 tablet 3 2/27/2023    labetaloL (NORMODYNE) 100 MG tablet TAKE 1 TABLET BY MOUTH TWICE A   tablet 3 2/27/2023    linaCLOtide (LINZESS) 72 mcg Cap capsule Take 72 mcg by mouth before breakfast.   2/27/2023    losartan (COZAAR) 50 MG tablet Take 50 mg by mouth once daily.   2/27/2023    pantoprazole (PROTONIX) 40 MG tablet Take 40 mg by mouth once daily.   2/27/2023    ranolazine (RANEXA) 500 MG Tb12 Take 1 tablet (500 mg total) by mouth 2 (two) times daily. 180 tablet 1 2/27/2023    cycloSPORINE (RESTASIS) 0.05 % ophthalmic emulsion Restasis 0.05 % eye drops in a dropperette       dicyclomine (BENTYL) 20 mg tablet Take 20 mg by mouth every 6 (six) hours.   Unknown    fluticasone propionate (FLONASE) 50 mcg/actuation nasal spray 1 SPRAY INTO EACH NOSTRIL ONCE DAILY. 48 mL 2 Unknown    HYDROcodone-acetaminophen (NORCO) 5-325 mg per tablet Take 1 tablet by mouth every 6 (six) hours as needed for Pain. 15 tablet 0 Unknown    nitroGLYCERIN (NITROSTAT) 0.4 MG SL tablet Place 1 tablet (0.4 mg total) under the tongue every 5 (five) minutes as needed for Chest pain. 30 tablet 2 Unknown    rosuvastatin (CRESTOR) 5 MG tablet Take 5 mg by mouth once daily.   Unknown       Review of patient's allergies indicates:   Allergen Reactions    Alendronate sodium Anaphylaxis    Pentazocine Anaphylaxis    Risedronate sodium Anaphylaxis    Codeine      Nausea/vomiting    Iodinated contrast media     Iodine     Oxycodone hcl-oxycodone-asa      Nausea/vomiting    Sulfa (sulfonamide antibiotics)         Social History     Tobacco Use    Smoking status: Never    Smokeless tobacco: Never   Substance Use Topics    Alcohol use: No        Family History   Family history unknown: Yes       Review of Systems  Review of Systems   Constitutional: Negative for fever.   HEENT: Negative for drooling, ear pain, facial swelling and nosebleeds.    Eyes: Negative for discharge and visual disturbance.   Respiratory: Negative for cough, positive shortness of breath.    Cardiovascular; mild shortness of breath, chest pain Gastrointestinal:  "Negative for anal bleeding and rectal pain. Negative Nausea or Vomiting.  Genitourinary: Negative for decreased urine volume and dysuria  Musculoskeletal: Negative for neck pain.   Skin: Negative for rash.   Neurological: negative for numbness, negative for weakness,negative for seizures and facial asymmetry.              Objective:     No intake/output data recorded.    BP (!) 170/72 (BP Location: Right arm, Patient Position: Lying)   Pulse 66   Temp 97.8 °F (36.6 °C) (Temporal)   Resp 16   Ht 5' 6" (1.676 m)   Wt 61.2 kg (135 lb)   SpO2 97%   BMI 21.79 kg/m²     General Appearance:    Awake, alert, not in acute distress   HEENT:   atraumatic, PERRL, EOM intact, conjuctiva pink, sclera anicteric, oropharynx moist, no lesions noted   Neck:    Supple, no JVD, no carotid bruits, no lymphadenopathy or thyromegaly noted       Pulmonary:   Clear to auscultation bilaterally, reduced breath sounds bileterally.   Cardiovascular:    Regular rate and rhythm, S1 S2 normal, systolic murmur on left sternal border   Abdomen:     Soft, non-tender,nondistended, bowel sounds active all four quadrants,    Extremities:  no edema, no cyanosis or clubbing noted       Skin:   No bruises noted.       Neurologic: Alert, awake, oriented x 3, moves all extremities.                 Data Review :   Labs:    CBC:   Lab Results   Component Value Date    WBC 4.4 02/27/2023    WBC 4.4 08/10/2022    WBC 5.50 11/09/2020    RBC 3.09 (L) 02/27/2023    RBC 3.29 (L) 08/10/2022    HGB 8.6 (L) 02/27/2023    HGB 9.7 (L) 08/10/2022    HCT 25.9 (L) 02/27/2023    HCT 30.4 (L) 08/10/2022     02/27/2023     08/10/2022     CMP:   Lab Results   Component Value Date     02/27/2023     08/10/2022     11/09/2020    K 4.2 02/27/2023    K 5.7 (H) 08/10/2022    K 4.7 11/09/2020     08/10/2022     (H) 11/09/2020    CO2 24 02/27/2023    CO2 23 08/10/2022    CO2 24 11/09/2020    BUN 19.0 02/27/2023    BUN 21 08/10/2022    " BUN 11.7 11/09/2020    CREATININE 0.95 02/27/2023    CREATININE 1.19 (H) 08/10/2022    CREATININE 0.83 11/09/2020    CALCIUM 8.3 (L) 02/27/2023    CALCIUM 9.6 08/10/2022    CALCIUM 9.3 11/09/2020    ALKPHOS 43 (L) 02/27/2023    AST 23 02/27/2023    AST 14 08/10/2022    AST 21 11/09/2020    ALT <4 02/27/2023    ALT <3 (L) 08/10/2022    ALBUMIN 3.6 02/27/2023    ALBUMIN 4.0 08/10/2022    ALBUMIN 4.1 11/09/2020    BILITOT 0.2 02/27/2023    BILITOT 0.4 08/10/2022     Cardiac markers: No results found for: BNP, CKMB, CKTOTAL, TROPONIN, MYOGLOBIN    Radiology:  Micro:  No components found for: BLOODCX, SPUTUMCX, URINECX    Radiology:  @[facility]@        Assessment & Plan:   1. Atypical chest pain; acute coronary syndrome rule out.  May be related to moderate aortic stenosis/moderate mitral regurgitation.  Echocardiogram shows normal ejection fraction.  Chest pain had improved.  Continue analgesics.  Continue to trend cardiac enzymes and follow up EKG.  Patient's cardiologist has been consulted for evaluation in the morning.  Continue aspirin, beta-blocker and isosorbide mononitrate and ranolazine.    2. Hypertension; continue labetalol and losartan.    3. Gastroesophageal reflux disease; continue Protonix    4. Hyperlipidemia; continue statin    5. Maintain the patient on DVT prophylaxis by way of Lovenox      Disposition; for evaluation by the cardiologist in the morning  This note was completed using voice recognition software and transcription errors may occur.    This Encounter was via Telemedicine and from [place], [place].  Patient was located in [place].    Place patient on observation status.    Sherine Escobedo  2/28/2023  5:17 AM

## 2023-03-01 ENCOUNTER — PATIENT OUTREACH (OUTPATIENT)
Dept: ADMINISTRATIVE | Facility: HOSPITAL | Age: 88
End: 2023-03-01
Payer: MEDICARE

## 2023-03-01 PROBLEM — E83.42 HYPOMAGNESEMIA: Status: ACTIVE | Noted: 2023-03-01

## 2023-03-01 LAB
ALBUMIN SERPL-MCNC: 3.2 G/DL (ref 3.4–5)
ALBUMIN/GLOB SERPL: 1.1 RATIO
ALP SERPL-CCNC: 37 UNIT/L (ref 50–144)
ALT SERPL-CCNC: <4 UNIT/L (ref 1–45)
ANION GAP SERPL CALC-SCNC: 4 MEQ/L (ref 2–13)
AST SERPL-CCNC: 19 UNIT/L (ref 14–36)
BASOPHILS # BLD AUTO: 0.05 X10(3)/MCL (ref 0.01–0.08)
BASOPHILS NFR BLD AUTO: 1 % (ref 0.1–1.2)
BILIRUBIN DIRECT+TOT PNL SERPL-MCNC: 0.2 MG/DL (ref 0–1)
BUN SERPL-MCNC: 17 MG/DL (ref 7–20)
CALCIUM SERPL-MCNC: 9 MG/DL (ref 8.4–10.2)
CHLORIDE SERPL-SCNC: 107 MMOL/L (ref 98–110)
CK MB SERPL-MCNC: 0.63 NG/ML (ref 0–3.38)
CK SERPL-CCNC: 77 U/L (ref 30–135)
CO2 SERPL-SCNC: 26 MMOL/L (ref 21–32)
CREAT SERPL-MCNC: 0.8 MG/DL (ref 0.66–1.25)
CREAT/UREA NIT SERPL: 21 (ref 12–20)
EOSINOPHIL # BLD AUTO: 0.15 X10(3)/MCL (ref 0.04–0.36)
EOSINOPHIL NFR BLD AUTO: 3.1 % (ref 0.7–7)
ERYTHROCYTE [DISTWIDTH] IN BLOOD BY AUTOMATED COUNT: 16.3 % (ref 11–14.5)
FERRITIN SERPL-MCNC: 105 NG/ML (ref 6.24–264)
FOLATE SERPL-MCNC: >20 NG/ML (ref 2.8–20)
GFR SERPLBLD CREATININE-BSD FMLA CKD-EPI: 69 MLS/MIN/1.73/M2
GLOBULIN SER-MCNC: 2.9 GM/DL (ref 2–3.9)
GLUCOSE SERPL-MCNC: 87 MG/DL (ref 70–115)
HCT VFR BLD AUTO: 27 % (ref 36–48)
HGB BLD-MCNC: 9.4 G/DL (ref 11.8–16)
IMM GRANULOCYTES # BLD AUTO: 0.01 X10(3)/MCL (ref 0–0.03)
IMM GRANULOCYTES NFR BLD AUTO: 0.2 % (ref 0–0.5)
IRON SATN MFR SERPL: 19 % (ref 20–50)
IRON SERPL-MCNC: 37 UG/DL (ref 50–170)
LYMPHOCYTES # BLD AUTO: 1.09 X10(3)/MCL (ref 1.16–3.74)
LYMPHOCYTES NFR BLD AUTO: 22.2 % (ref 20–55)
MAGNESIUM SERPL-MCNC: 1.7 MG/DL (ref 1.8–2.4)
MCH RBC QN AUTO: 28.5 PG (ref 27–34)
MCV RBC AUTO: 81.8 FL (ref 79–99)
MEAN CELL HEMOGLOBIN CONCENTRATION (OHS) G/DL: 34.8 G/DL (ref 31–37)
MONOCYTES # BLD AUTO: 0.64 X10(3)/MCL (ref 0.24–0.36)
MONOCYTES NFR BLD AUTO: 13.1 % (ref 4.7–12.5)
NEUTROPHILS # BLD AUTO: 2.96 X10(3)/MCL (ref 1.56–6.13)
NEUTROPHILS NFR BLD AUTO: 60.4 % (ref 37–73)
NRBC BLD AUTO-RTO: 0 % (ref 0–1)
PLATELET # BLD AUTO: 238 X10(3)/MCL (ref 140–371)
PMV BLD AUTO: 12.5 FL (ref 9.4–12.4)
POTASSIUM SERPL-SCNC: 4.4 MMOL/L (ref 3.5–5.1)
PROT SERPL-MCNC: 6.1 GM/DL (ref 6.3–8.2)
RBC # BLD AUTO: 3.3 X10(6)/MCL (ref 4–5.1)
SODIUM SERPL-SCNC: 137 MMOL/L (ref 135–145)
TIBC SERPL-MCNC: 158 UG/DL (ref 70–310)
TIBC SERPL-MCNC: 195 UG/DL (ref 250–450)
TROPONIN I SERPL-MCNC: <0.012 NG/ML (ref 0–0.03)
VIT B12 SERPL-MCNC: 258 PG/ML (ref 211–946)
WBC # SPEC AUTO: 4.9 X10(3)/MCL (ref 4–11.5)

## 2023-03-01 PROCEDURE — 36415 COLL VENOUS BLD VENIPUNCTURE: CPT | Performed by: FAMILY MEDICINE

## 2023-03-01 PROCEDURE — 63600175 PHARM REV CODE 636 W HCPCS: Performed by: FAMILY MEDICINE

## 2023-03-01 PROCEDURE — 85025 COMPLETE CBC W/AUTO DIFF WBC: CPT | Performed by: FAMILY MEDICINE

## 2023-03-01 PROCEDURE — 82728 ASSAY OF FERRITIN: CPT | Performed by: FAMILY MEDICINE

## 2023-03-01 PROCEDURE — 21400001 HC TELEMETRY ROOM

## 2023-03-01 PROCEDURE — 82553 CREATINE MB FRACTION: CPT | Performed by: FAMILY MEDICINE

## 2023-03-01 PROCEDURE — 82607 VITAMIN B-12: CPT | Performed by: FAMILY MEDICINE

## 2023-03-01 PROCEDURE — 25000003 PHARM REV CODE 250

## 2023-03-01 PROCEDURE — 80053 COMPREHEN METABOLIC PANEL: CPT | Performed by: FAMILY MEDICINE

## 2023-03-01 PROCEDURE — 84484 ASSAY OF TROPONIN QUANT: CPT | Performed by: FAMILY MEDICINE

## 2023-03-01 PROCEDURE — 25000003 PHARM REV CODE 250: Performed by: FAMILY MEDICINE

## 2023-03-01 PROCEDURE — 25000003 PHARM REV CODE 250: Performed by: NURSE PRACTITIONER

## 2023-03-01 PROCEDURE — 82746 ASSAY OF FOLIC ACID SERUM: CPT | Performed by: FAMILY MEDICINE

## 2023-03-01 PROCEDURE — 94761 N-INVAS EAR/PLS OXIMETRY MLT: CPT

## 2023-03-01 PROCEDURE — 83550 IRON BINDING TEST: CPT | Performed by: FAMILY MEDICINE

## 2023-03-01 PROCEDURE — 25000242 PHARM REV CODE 250 ALT 637 W/ HCPCS

## 2023-03-01 PROCEDURE — 82550 ASSAY OF CK (CPK): CPT | Performed by: FAMILY MEDICINE

## 2023-03-01 PROCEDURE — 83735 ASSAY OF MAGNESIUM: CPT | Performed by: FAMILY MEDICINE

## 2023-03-01 RX ORDER — ENOXAPARIN SODIUM 100 MG/ML
60 INJECTION SUBCUTANEOUS
Status: DISCONTINUED | OUTPATIENT
Start: 2023-03-01 | End: 2023-03-08 | Stop reason: HOSPADM

## 2023-03-01 RX ORDER — MAGNESIUM SULFATE HEPTAHYDRATE 40 MG/ML
2 INJECTION, SOLUTION INTRAVENOUS ONCE
Status: COMPLETED | OUTPATIENT
Start: 2023-03-01 | End: 2023-03-01

## 2023-03-01 RX ADMIN — Medication 1 TABLET: at 09:03

## 2023-03-01 RX ADMIN — MAGNESIUM SULFATE HEPTAHYDRATE 2 G: 40 INJECTION, SOLUTION INTRAVENOUS at 02:03

## 2023-03-01 RX ADMIN — FLUTICASONE PROPIONATE 50 MCG: 50 SPRAY, METERED NASAL at 09:03

## 2023-03-01 RX ADMIN — RANOLAZINE 1000 MG: 500 TABLET, EXTENDED RELEASE ORAL at 08:03

## 2023-03-01 RX ADMIN — ENOXAPARIN SODIUM 60 MG: 60 INJECTION SUBCUTANEOUS at 08:03

## 2023-03-01 RX ADMIN — Medication 2 DROP: at 05:03

## 2023-03-01 RX ADMIN — ASPIRIN 81 MG: 81 TABLET, COATED ORAL at 09:03

## 2023-03-01 RX ADMIN — DICYCLOMINE HYDROCHLORIDE 20 MG: 20 TABLET ORAL at 12:03

## 2023-03-01 RX ADMIN — LOSARTAN POTASSIUM 50 MG: 50 TABLET, FILM COATED ORAL at 09:03

## 2023-03-01 RX ADMIN — Medication 2 DROP: at 06:03

## 2023-03-01 RX ADMIN — IRON SUCROSE 100 MG: 20 INJECTION, SOLUTION INTRAVENOUS at 02:03

## 2023-03-01 RX ADMIN — Medication 2 DROP: at 09:03

## 2023-03-01 RX ADMIN — AMLODIPINE BESYLATE 5 MG: 5 TABLET ORAL at 09:03

## 2023-03-01 RX ADMIN — RANOLAZINE 1000 MG: 500 TABLET, EXTENDED RELEASE ORAL at 09:03

## 2023-03-01 RX ADMIN — DICYCLOMINE HYDROCHLORIDE 20 MG: 20 TABLET ORAL at 05:03

## 2023-03-01 RX ADMIN — GABAPENTIN 300 MG: 300 CAPSULE ORAL at 08:03

## 2023-03-01 RX ADMIN — GABAPENTIN 300 MG: 300 CAPSULE ORAL at 09:03

## 2023-03-01 RX ADMIN — ENOXAPARIN SODIUM 60 MG: 60 INJECTION SUBCUTANEOUS at 09:03

## 2023-03-01 RX ADMIN — LABETALOL HYDROCHLORIDE 100 MG: 100 TABLET, FILM COATED ORAL at 08:03

## 2023-03-01 RX ADMIN — ISOSORBIDE MONONITRATE 60 MG: 30 TABLET, EXTENDED RELEASE ORAL at 09:03

## 2023-03-01 RX ADMIN — GABAPENTIN 300 MG: 300 CAPSULE ORAL at 02:03

## 2023-03-01 RX ADMIN — LABETALOL HYDROCHLORIDE 100 MG: 100 TABLET, FILM COATED ORAL at 09:03

## 2023-03-01 RX ADMIN — Medication 2 DROP: at 01:03

## 2023-03-01 RX ADMIN — DICYCLOMINE HYDROCHLORIDE 20 MG: 20 TABLET ORAL at 04:03

## 2023-03-01 RX ADMIN — PANTOPRAZOLE SODIUM 40 MG: 40 TABLET, DELAYED RELEASE ORAL at 09:03

## 2023-03-01 RX ADMIN — ATORVASTATIN CALCIUM 10 MG: 10 TABLET, FILM COATED ORAL at 09:03

## 2023-03-01 NOTE — ASSESSMENT & PLAN NOTE
Magnesium reviewed- Recent Labs   Lab 03/01/23  0439   MG 1.70*      Will replace magnesium and continue to monitor.  Gave another 2gm iv and will add po replacement

## 2023-03-01 NOTE — PROGRESS NOTES
[facility]/Surg  [facility] Medicine  Progress Note    Patient Name: Martha Caraballo  MRN: [id number]  Patient Class: IP- Inpatient   Admission Date: 2/27/2023  Length of Stay: 1 days  Attending Physician: Maria Del Rosario Reagan MD  Primary Care Provider: Jordan Sanchez MD        Subjective:     Principal Problem:Chest pain        HPI:  Patient is a 89 y.o. female pt of Dr. Sanchez in ,  with a past medical history of moderate aortic stenosis, hypertension, hyperlipidemia, TIA, gastroesophageal reflux disease, osteoarthritis and coronary artery disease, unclear if she had any intervention in the past presents to the ER on account of left-sided chest pain over the past few days.    Patient has been at her usual state of health until about 6 days ago when she developed left-sided chest pain, pressure-like, about 5/10 in intensity, radiating to the left upper extremities with no aggravating or relieving factor.  She admits occasional shortness of breath.  No dizziness or loss of consciousness.  She decided to come to the ER for further evaluation.    At the ER, cardiac enzymes not remarkable.  Chest x-ray shows no acute findings      Overview/Hospital Course:  02/28/2023 spoke with Dr. Silva, wants therapeutic dose lovenox x 48 hrs, add amlodipine and increased ranexa.  Current cardiac enzymes are negative.  Pt does admit to some 2-3/10 left sided chest pain.  03/01/2023 troponin have been negative, no chest pain reproducible component left shoulder pain      Interval History:      Review of Systems   Constitutional:  Negative for activity change, appetite change and fever.   Respiratory:  Negative for chest tightness, shortness of breath and wheezing.    Cardiovascular:  Negative for chest pain.   Gastrointestinal:  Negative for abdominal pain, constipation, diarrhea, nausea and vomiting.   Genitourinary:  Negative for dysuria.   Musculoskeletal:  Positive for arthralgias (left shoulder).   Skin:   Negative for rash and wound.   Neurological:  Negative for tremors and headaches.   Objective:     Vital Signs (Most Recent):  Temp: 97.7 °F (36.5 °C) (03/01/23 1549)  Pulse: 86 (03/01/23 1549)  Resp: 20 (03/01/23 1549)  BP: (!) 172/95 (03/01/23 1549)  SpO2: 96 % (03/01/23 1549) Vital Signs (24h Range):  Temp:  [97.1 °F (36.2 °C)-98.3 °F (36.8 °C)] 97.7 °F (36.5 °C)  Pulse:  [63-86] 86  Resp:  [16-20] 20  SpO2:  [96 %-99 %] 96 %  BP: (128-172)/(58-95) 172/95     Weight: 59.9 kg (132 lb 1 oz)  Body mass index is 21.32 kg/m².    Intake/Output Summary (Last 24 hours) at 3/1/2023 1634  Last data filed at 3/1/2023 1524  Gross per 24 hour   Intake 240 ml   Output 7 ml   Net 233 ml        Physical Exam  Constitutional:       General: She is not in acute distress.     Appearance: Normal appearance. She is normal weight. She is not ill-appearing.   HENT:      Head: Normocephalic and atraumatic.   Eyes:      General: No scleral icterus.     Extraocular Movements: Extraocular movements intact.   Cardiovascular:      Rate and Rhythm: Normal rate and regular rhythm.      Pulses: Normal pulses.      Heart sounds: Normal heart sounds.   Pulmonary:      Effort: Pulmonary effort is normal.      Breath sounds: Normal breath sounds.   Abdominal:      General: Abdomen is flat. Bowel sounds are normal.      Palpations: Abdomen is soft.   Skin:     General: Skin is warm and dry.      Capillary Refill: Capillary refill takes less than 2 seconds.      Findings: No erythema, lesion or rash.   Neurological:      General: No focal deficit present.      Mental Status: She is alert and oriented to person, place, and time.   Psychiatric:         Mood and Affect: Mood normal.         Behavior: Behavior normal.         Thought Content: Thought content normal.       Significant Labs: All pertinent labs within the past 24 hours have been reviewed.  CBC:   Recent Labs   Lab 02/27/23  2148 02/28/23  0637 03/01/23  0439   WBC 4.4 3.9* 4.9   HGB 8.6*  9.5* 9.4*   HCT 25.9* 28.2* 27.0*    236 238       CMP:   Recent Labs   Lab 02/27/23  2148 03/01/23  0439    137   K 4.2 4.4   CO2 24 26   BUN 19.0 17.0   CREATININE 0.95 0.80   CALCIUM 8.3* 9.0   ALBUMIN 3.6 3.2*   BILITOT 0.2 0.2   ALKPHOS 43* 37*   AST 23 19   ALT <4 <4         Significant Imaging: I have reviewed all pertinent imaging results/findings within the past 24 hours.      Assessment/Plan:      * Chest pain  Trend cardiac enzymes  troponins have been negativen      Coronary artery disease  CIS consulted and trend enzymes  Increased ranexa  Continue statin  lovenox high dose  Continue amlodipine      Aortic valve stenosis  Trend cardiac enxymes  CIS following      Iron deficiency anemia     Iron sat is 19%  Add venofer    HTN (hypertension)  Chronic, controlled.  Latest blood pressure and vitals reviewed-   Temp:  [97.1 °F (36.2 °C)-98.3 °F (36.8 °C)]   Pulse:  [63-86]   Resp:  [16-20]   BP: (128-172)/(58-95)   SpO2:  [96 %-99 %] .   Home meds for hypertension were reviewed and noted below.   Hypertension Medications             isosorbide mononitrate (IMDUR) 60 MG 24 hr tablet TAKE 1 TABLET BY MOUTH ONCE DAILY    labetaloL (NORMODYNE) 100 MG tablet TAKE 1 TABLET BY MOUTH TWICE A DAY    losartan (COZAAR) 50 MG tablet Take 50 mg by mouth once daily.    nitroGLYCERIN (NITROSTAT) 0.4 MG SL tablet Place 1 tablet (0.4 mg total) under the tongue every 5 (five) minutes as needed for Chest pain.          While in the hospital, will manage blood pressure as follows; Continue home antihypertensive regimen    Will utilize p.r.n. blood pressure medication only if patient's blood pressure greater than  180/110 and she develops symptoms such as worsening chest pain or shortness of breath.        HLD (hyperlipidemia)   Patient is chronically on statin.will continue for now. Monitor clinically. Last LDL was   Lab Results   Component Value Date    LDLCALC 59 12/21/2022              VTE Risk Mitigation (From  admission, onward)         Ordered     enoxaparin injection 60 mg  Every 12 hours (non-standard times)         03/01/23 0852     IP VTE HIGH RISK PATIENT  Once         02/28/23 0152     Place sequential compression device  Until discontinued         02/28/23 0152                Discharge Planning   JAN: 3/3/2023     Code Status: Full Code   Is the patient medically ready for discharge?:     Reason for patient still in hospital (select all that apply): Patient trending condition, Treatment and Consult recommendations  Discharge Plan A: Home Health                  Maria Del Rosario Reagan MD  Department of Hospital Medicine   [facility]/Surg

## 2023-03-01 NOTE — SUBJECTIVE & OBJECTIVE
Interval History:      Review of Systems   Constitutional:  Negative for activity change, appetite change and fever.   Respiratory:  Negative for chest tightness, shortness of breath and wheezing.    Cardiovascular:  Negative for chest pain.   Gastrointestinal:  Negative for abdominal pain, constipation, diarrhea, nausea and vomiting.   Genitourinary:  Negative for dysuria.   Musculoskeletal:  Positive for arthralgias (left shoulder).   Skin:  Negative for rash and wound.   Neurological:  Negative for tremors and headaches.   Objective:     Vital Signs (Most Recent):  Temp: 97.7 °F (36.5 °C) (03/01/23 1549)  Pulse: 86 (03/01/23 1549)  Resp: 20 (03/01/23 1549)  BP: (!) 172/95 (03/01/23 1549)  SpO2: 96 % (03/01/23 1549) Vital Signs (24h Range):  Temp:  [97.1 °F (36.2 °C)-98.3 °F (36.8 °C)] 97.7 °F (36.5 °C)  Pulse:  [63-86] 86  Resp:  [16-20] 20  SpO2:  [96 %-99 %] 96 %  BP: (128-172)/(58-95) 172/95     Weight: 59.9 kg (132 lb 1 oz)  Body mass index is 21.32 kg/m².    Intake/Output Summary (Last 24 hours) at 3/1/2023 1634  Last data filed at 3/1/2023 1524  Gross per 24 hour   Intake 240 ml   Output 7 ml   Net 233 ml        Physical Exam  Constitutional:       General: She is not in acute distress.     Appearance: Normal appearance. She is normal weight. She is not ill-appearing.   HENT:      Head: Normocephalic and atraumatic.   Eyes:      General: No scleral icterus.     Extraocular Movements: Extraocular movements intact.   Cardiovascular:      Rate and Rhythm: Normal rate and regular rhythm.      Pulses: Normal pulses.      Heart sounds: Normal heart sounds.   Pulmonary:      Effort: Pulmonary effort is normal.      Breath sounds: Normal breath sounds.   Abdominal:      General: Abdomen is flat. Bowel sounds are normal.      Palpations: Abdomen is soft.   Skin:     General: Skin is warm and dry.      Capillary Refill: Capillary refill takes less than 2 seconds.      Findings: No erythema, lesion or rash.    Neurological:      General: No focal deficit present.      Mental Status: She is alert and oriented to person, place, and time.   Psychiatric:         Mood and Affect: Mood normal.         Behavior: Behavior normal.         Thought Content: Thought content normal.       Significant Labs: All pertinent labs within the past 24 hours have been reviewed.  CBC:   Recent Labs   Lab 02/27/23 2148 02/28/23  0637 03/01/23  0439   WBC 4.4 3.9* 4.9   HGB 8.6* 9.5* 9.4*   HCT 25.9* 28.2* 27.0*    236 238       CMP:   Recent Labs   Lab 02/27/23 2148 03/01/23  0439    137   K 4.2 4.4   CO2 24 26   BUN 19.0 17.0   CREATININE 0.95 0.80   CALCIUM 8.3* 9.0   ALBUMIN 3.6 3.2*   BILITOT 0.2 0.2   ALKPHOS 43* 37*   AST 23 19   ALT <4 <4         Significant Imaging: I have reviewed all pertinent imaging results/findings within the past 24 hours.

## 2023-03-01 NOTE — PROGRESS NOTES
[facility]-Emergency Dept  Cardiology  Progress Note    Patient Name: Martha Caraballo  MRN: [id number]  Admission Date: 2/27/2023  Hospital Length of Stay: 1 days  Code Status: Full Code   Attending Provider: Maria Del Rosario Reagan MD   Consulting Provider: CLIFF Muller  Primary Care Physician: Jordan Sanchez MD      Patient information was obtained from patient, ER records, and primary team.     Consults  Subjective:     Chief Complaint:    Telecardiology Consult  Location: [facility] ER  Reason for consultation: Chest pain shortness of breath  Consulting provider: Dr Jason Mccann  Duration: Greater than 30 minutes including review of epic and OMS records, patient and provider interview    HPI:   89-year-old female with a history of coronary artery disease, aortic stenosis, hypertension, GERD, dyslipidemia.  She has a prior history of an LAD stent as well as a chronically occluded right coronary artery.  She had a hospitalization in September 2022 with chest pain.  Her last coronary angiogram was performed in June 2022 showing in-stent restenosis of her LAD at 30%, circumflex was normal providing collaterals to the RCA territory, and the RCA was chronically occluded at the ostium.  Her last clinic visit was a few days ago February 22.  There was an echocardiogram ordered to reevaluate her aortic stenosis.  She has had a history of GI bleeding in the past.    Telecardiology consult 2/27: She presents to the ER tonight with shortness of breath and chest discomfort she is unsure whether she may have passed out earlier today.  He states that her symptoms have become worse, she gets chest pain and shortness of breath with any type of activity.  Her EKG upon arrival showed a normal sinus rhythm with no acute changes.  Lab work shows she is mildly anemic at 8 and 25.  Her troponin was less than 0.012.  BNP was mildly elevated at 515.    2/28: BP elevated, NAD, laying flat in bed, CE  neg x 2. No EKG today. Tele SR 64. Echo results pending    3/1: BP still elevated, NAD, lying flat in bed, CE neg x 3, Echo EF 55-60%    Previous cardiac testing:  Results for orders placed in visit on 09/20/22    Echo    Narrative  Ht: 66in        Wt: 130lbs      BSA: 1.66m2      BMI: 20.98  HR: 62/min      BP: /           T:               Rhy:    Summary  * Normal LV size with normal function. The ejection fraction is 55-60%. There is no left ventricular  hypertrophy.  * Normal right ventricular size with normal function.  * Moderate AS present. CAMILA 1.04 cm2, DI 0.37, PV 2.34 m/s, MG 13 mmHg  * There is moderate (2-3+) mitral regurgitation.  * There is mild-moderate (1-2+) tricuspid regurgitation.  * PASP = 47 mmHg.    Interpretation Detail  Gen: Fair study quality.    LV: The left ventricle is normal in size with normal systolic function. The ejection fraction is 55-60%. The  LV global longitudinal strain is -18%. There is no left ventricular hypertrophy.    RV: The right ventricle is normal in size with normal function.    MV: The mitral valve is normal. There is moderate mitral regurgitation (). The mitral valve area is normal,  Valve area= 3.49 cm  and Mean grad= 3 mmHg. There is mild mitral annular calcification.    AV: The aortic valve is trileaflet. The aortic valve is moderately calcified. There is, Valve area(C.Eq.)= and  Mean Grad=12mmHg.    TV: The tricuspid valve is normal. There is mild to moderate tricuspid regurgitation.    Macie: The pericardium is normal.    PA: (PASP=47mmHg).    Ao: The aorta is normal.    Cava: There is less than 50% inspiratory collapse of the IVC.    Measurements  LV Walls                  LV Diastolic Function  IVSd:    0.86cm      ( 0.9)      E/A:       1.00     (Variable)  ILWd:    0.94cm      ( 0.9)      E/e' Med:  10.82    ( 15)  E/e' Lat:  9.05     ( 13)  DT:        199ms    (200 40)  LV Mass  Linear:  139g        ( 162)                LV Outflow  LVMI:    84g/m2      (  95)  Palmira:       1.80cm   ( 3.4)  Peak Todd:  86cm/s   ( 110)  LV Chamber             Peak Grad: 3mmHg    (<30)  Mean Grad: 1.80mmHg  LVIDd:   4.62cm      ( 5.22)     LVOT TVI:  23.6cm   (>18)  LVIDdI:  2.78cm/m2   ( 3.2)  LVIDs:   3.40cm      ( 3.48)  LVIDsI:  2.05cm/m2                        Aortic Valve  RWT:     0.41cm      ( 0.42)  Peak Todd:  236cm/s  ( 170)  Mean Todd:  160cm/s  LV Volumes             Peak Grad: 23mmHg  Mean Grad: 12mmHg   (<20)  EDV:     69mL        ( 106)      TVI:       60.4cm  EDVI:    41.6mL/m2   ( 61)       Todd Ratio: 0.36     (>0.50)  ESV:     35mL        ( 42)  ESVI:    21.1mL/m2   ( 24)  Mitral Valve  LV Systolic Function        Peak Todd:  165cm/s  (<30)  Peak Grad: 11mmHg  Mean Grad: 3mmHg    (<5)  SV:      34mL        ()    MV P 1/2:  63ms     (40-70)  CO:      2.1L/min    (4.0-8.0)   MVA:       3.49cm2  (>2.2)  CI:      1.3L/min/m2 (2.5-4)  FS:      26%         ( 45%)  EF:      55%         ( 54%)             Tricuspid Valve  GLS:     *18%  TR Grad:   32mmHg   (31)    RV Function  Pulmonary Pressure  TAPSE:   1.88cm      ( 1.7)  RAP:       15mmHg   (3-5)  PASP:      47mmHg   ( 30)  Left Atrium  AP:      3.7cm       ( 3.8)  Vol:     46.3mL      (22-52)  Vol Ind: 27.9mL/m2   ( 34)    Aorta  Root:    2.89cm      ( 3.3)    Cavae  IVC Palmira: 2.15cm      (<2.1)  Kali Silva MD  Electronically signed by Dr. Kali Silva on 09/19/2022 at 8:27 PM     [facility] ([facility] 6/2022)   MVCAD with patent stent in LAD and  of RCA    Past Medical History:   Diagnosis Date    Acid reflux     Arthritis     Carpal tunnel syndrome 9/25/2018    Cataract     Coronary artery disease 3/15/2019    Dry eyes     Heart murmur     Hyperlipidemia     Hypertension     Transient ischemic attack 5/20/2020       Past Surgical History:   Procedure Laterality Date    ADENOIDECTOMY      APPENDECTOMY      CHOLECYSTECTOMY      EYE SURGERY      HYSTERECTOMY      SPLENECTOMY, TOTAL      TONSILLECTOMY         Review of patient's  allergies indicates:   Allergen Reactions    Alendronate sodium Anaphylaxis    Pentazocine Anaphylaxis    Risedronate sodium Anaphylaxis    Codeine      Nausea/vomiting    Iodinated contrast media     Iodine     Oxycodone [id number]-asa      Nausea/vomiting    Sulfa (sulfonamide antibiotics)        No current facility-administered medications on file prior to encounter.     Current Outpatient Medications on File Prior to Encounter   Medication Sig    aspirin (ECOTRIN) 81 MG EC tablet Take 81 mg by mouth once daily. OTC    ferrous fumarate-folic acid 324 mg, 106mg iron,-1mg (HEMATINIC/FOLIC ACID) Tab TAKE 1 TABLET BY MOUTH EVERY DAY    gabapentin (NEURONTIN) 300 MG capsule Take 1 capsule (300 mg total) by mouth 3 (three) times daily.    isosorbide mononitrate (IMDUR) 60 MG 24 hr tablet TAKE 1 TABLET BY MOUTH ONCE DAILY    labetaloL (NORMODYNE) 100 MG tablet TAKE 1 TABLET BY MOUTH TWICE A DAY    linaCLOtide (LINZESS) 72 mcg Cap capsule Take 72 mcg by mouth before breakfast.    losartan (COZAAR) 50 MG tablet Take 50 mg by mouth once daily.    pantoprazole (PROTONIX) 40 MG tablet Take 40 mg by mouth once daily.    ranolazine (RANEXA) 500 MG Tb12 Take 1 tablet (500 mg total) by mouth 2 (two) times daily.    cycloSPORINE (RESTASIS) 0.05 % ophthalmic emulsion Restasis 0.05 % eye drops in a dropperette    dicyclomine (BENTYL) 20 mg tablet Take 20 mg by mouth every 6 (six) hours.    fluticasone propionate (FLONASE) 50 mcg/actuation nasal spray 1 SPRAY INTO EACH NOSTRIL ONCE DAILY.    HYDROcodone-acetaminophen (NORCO) 5-325 mg per tablet Take 1 tablet by mouth every 6 (six) hours as needed for Pain.    nitroGLYCERIN (NITROSTAT) 0.4 MG SL tablet Place 1 tablet (0.4 mg total) under the tongue every 5 (five) minutes as needed for Chest pain.    rosuvastatin (CRESTOR) 5 MG tablet Take 5 mg by mouth once daily.     Family History       Family history is unknown by patient.          Tobacco Use    Smoking status: Never     Smokeless tobacco: Never   Substance and Sexual Activity    Alcohol use: No    Drug use: No    Sexual activity: Not on file     Review of Systems   Constitutional: Positive for malaise/fatigue.   HENT: Negative.     Eyes: Negative.    Cardiovascular:  Positive for chest pain. Negative for dyspnea on exertion and syncope.   Respiratory:  Negative for shortness of breath.    Endocrine: Negative.    Musculoskeletal:  Positive for joint pain.   Gastrointestinal: Negative.    Genitourinary: Negative.    Psychiatric/Behavioral: Negative.     Objective:     Vital Signs (Most Recent):  Temp: 97.7 °F (36.5 °C) (03/01/23 1549)  Pulse: 86 (03/01/23 1549)  Resp: 20 (03/01/23 1549)  BP: (!) 172/95 (03/01/23 1549)  SpO2: 96 % (03/01/23 1549)   Vital Signs (24h Range):  Temp:  [97.1 °F (36.2 °C)-98.3 °F (36.8 °C)] 97.7 °F (36.5 °C)  Pulse:  [63-86] 86  Resp:  [16-20] 20  SpO2:  [96 %-99 %] 96 %  BP: (128-172)/(58-95) 172/95     Weight: 59.9 kg (132 lb 1 oz)  Body mass index is 21.32 kg/m².    SpO2: 96 %         Intake/Output Summary (Last 24 hours) at 3/1/2023 1626  Last data filed at 3/1/2023 1524  Gross per 24 hour   Intake 240 ml   Output 7 ml   Net 233 ml         Lines/Drains/Airways       Peripheral Intravenous Line  Duration                  Peripheral IV - Single Lumen 03/01/23 0520 22 G Distal;Left;Posterior Forearm <1 day                    Physical Exam  Constitutional:       Appearance: Normal appearance. She is normal weight.   HENT:      Head: Normocephalic.      Nose: Nose normal.      Mouth/Throat:      Mouth: Mucous membranes are moist.   Eyes:      Pupils: Pupils are equal, round, and reactive to light.   Cardiovascular:      Rate and Rhythm: Normal rate and regular rhythm.      Heart sounds: Murmur heard.   Pulmonary:      Effort: Pulmonary effort is normal.      Breath sounds: Normal breath sounds.   Abdominal:      General: Abdomen is flat. Bowel sounds are normal.   Musculoskeletal:         General: Normal  range of motion.      Cervical back: Normal range of motion.      Comments: Pain to Left side of chest and back with palpation   Skin:     General: Skin is warm and dry.   Neurological:      General: No focal deficit present.      Mental Status: She is alert. Mental status is at baseline.   Psychiatric:         Mood and Affect: Mood normal.       Significant Labs:   Recent Lab Results         03/01/23  0439        Albumin/Globulin Ratio 1.1       Albumin 3.2       Alkaline Phosphatase 37       ALT <4       Anion Gap 4.0       AST 19       Baso # 0.05       Basophil % 1.0       BILIRUBIN TOTAL 0.2       BUN 17.0       BUN/CREAT RATIO 21       Calcium 9.0       Chloride 107       CO2 26       CPK 77       CPK MB 0.63       Creatinine 0.80       eGFR 69  Comment:                      EGFR INTERPRETATION    Beginning 8/15/22 we are reporting the eGFRcr calculation as recommended by the National Kid[employer]. The eGFRcr equation has similar overall performance characteristics to the older equation, but the values may differ by more than 10% particularly at higher values of eGFRcr and younger adult ages.    NKF stages of chronic kidney disease (CKD)  Stage 1: Kidney damage with normal or increased eGFR (>90 mL/min/1.73 m^2)  Stage 2: Mild reduction in GFR (60-89 mL/min/1.73 m^2)  Stage 3a: Moderate reduction in GFR (45-59 mL/min/1.73 m^2)  Stage 3b: Moderate reduction in GFR (30-44 mL/min/1.73 m^2)  Stage 4: Severe reduction in GFR (15-29 mL/min/1.73 m^2)  Stage 5: Kidney failure (GFR <15 mL/min/1.73 m^2)           Eos # 0.15       Eosinophil % 3.1       Ferritin 105       Folate >20.0       Globulin, Total 2.9       Glucose 87       Hematocrit 27.0       Hemoglobin 9.4       Immature Grans (Abs) 0.01       Immature Granulocytes 0.2       Iron 37       Iron Binding Capacity Unsaturated 158       Iron Saturation 19       Lymph # 1.09       LYMPH % 22.2       Magnesium 1.70       MCH 28.5       MCHC 34.8       MCV  81.8       Mono # 0.64       Mono % 13.1       MPV 12.5       Neut # 2.96       Neut % 60.4       nRBC 0.0       Platelets 238       Potassium 4.4       PROTEIN TOTAL 6.1       RBC 3.30       RDW 16.3       Sodium 137       TIBC 195       Troponin I <0.012       Vitamin B-12 258       WBC 4.9               Significant Imaging:     Assessment and Plan:     Impression:  Atypical CP that is reproducible with palpation  -Negative EKG and cardiac biomarkers    Coronary artery disease  -Previous stenting of the LAD and known chronically occluded RCA      Valvular heart disease with aortic stenosis  -echo results pending    Hypertension  Dyslipidemia  - LDL at goal on Simvastatin 20    History of GI bleed  -  8/25    Plan:  Lovenox to 60 mg bid x 48 hours  Continue Ranexa to 1000 mg bid  Continue Norvasc 5 mg daily  Continue aspirin, isosorbide and labetalol  Monitor Bp; if decreased can then consider stopping Losartan if needed  If remains pain free and CE negative can follow up in clinic and consider OP Nuclear to assess for inferior ischemia given  of RCA but intervention would be high risk especially given age.        Active Diagnoses:    Diagnosis Date Noted POA    PRINCIPAL PROBLEM:  Chest pain [R07.9] 06/25/2019 Yes    HLD (hyperlipidemia) [E78.5] 02/28/2023 Yes    Aortic valve stenosis [I35.0] 02/27/2023 Yes    HTN (hypertension) [I10] 08/12/2019 Yes    Coronary artery disease [I25.10] 03/15/2019 Yes    Iron deficiency anemia [D50.9] 03/15/2019 Yes      Problems Resolved During this Admission:         VTE Risk Mitigation (From admission, onward)           Ordered     enoxaparin injection 60 mg  Every 12 hours (non-standard times)         03/01/23 0852     IP VTE HIGH RISK PATIENT  Once         02/28/23 0152     Place sequential compression device  Until discontinued         02/28/23 0152                     CLIFF Muller  Cardiology   [facility]-Emergency Dept

## 2023-03-01 NOTE — ASSESSMENT & PLAN NOTE
Chronic, controlled.  Latest blood pressure and vitals reviewed-   Temp:  [97.1 °F (36.2 °C)-98.3 °F (36.8 °C)]   Pulse:  [63-86]   Resp:  [16-20]   BP: (128-172)/(58-95)   SpO2:  [96 %-99 %] .   Home meds for hypertension were reviewed and noted below.   Hypertension Medications             isosorbide mononitrate (IMDUR) 60 MG 24 hr tablet TAKE 1 TABLET BY MOUTH ONCE DAILY    labetaloL (NORMODYNE) 100 MG tablet TAKE 1 TABLET BY MOUTH TWICE A DAY    losartan (COZAAR) 50 MG tablet Take 50 mg by mouth once daily.    nitroGLYCERIN (NITROSTAT) 0.4 MG SL tablet Place 1 tablet (0.4 mg total) under the tongue every 5 (five) minutes as needed for Chest pain.          While in the hospital, will manage blood pressure as follows; Continue home antihypertensive regimen    Will utilize p.r.n. blood pressure medication only if patient's blood pressure greater than  180/110 and she develops symptoms such as worsening chest pain or shortness of breath.

## 2023-03-01 NOTE — ASSESSMENT & PLAN NOTE
Patient is chronically on statin.will continue for now. Monitor clinically. Last LDL was   Lab Results   Component Value Date    LDLCALC 59 12/21/2022

## 2023-03-01 NOTE — ASSESSMENT & PLAN NOTE
[facility] consulted and trend enzymes  Increased ranexa  Continue statin  lovenox high dose  Continue amlodipine

## 2023-03-02 PROBLEM — I95.1 ORTHOSTATIC HYPOTENSION: Status: ACTIVE | Noted: 2023-03-02

## 2023-03-02 LAB
ALBUMIN SERPL-MCNC: 3.2 G/DL (ref 3.4–5)
ALBUMIN/GLOB SERPL: 1.2 RATIO
ALP SERPL-CCNC: 39 UNIT/L (ref 50–144)
ALT SERPL-CCNC: <4 UNIT/L (ref 1–45)
ANION GAP SERPL CALC-SCNC: 5 MEQ/L (ref 2–13)
AST SERPL-CCNC: 17 UNIT/L (ref 14–36)
BASOPHILS # BLD AUTO: 0.04 X10(3)/MCL (ref 0.01–0.08)
BASOPHILS NFR BLD AUTO: 1 % (ref 0.1–1.2)
BILIRUBIN DIRECT+TOT PNL SERPL-MCNC: 0.5 MG/DL (ref 0–1)
BUN SERPL-MCNC: 18 MG/DL (ref 7–20)
CALCIUM SERPL-MCNC: 8.5 MG/DL (ref 8.4–10.2)
CHLORIDE SERPL-SCNC: 106 MMOL/L (ref 98–110)
CO2 SERPL-SCNC: 26 MMOL/L (ref 21–32)
CREAT SERPL-MCNC: 0.9 MG/DL (ref 0.66–1.25)
CREAT/UREA NIT SERPL: 20 (ref 12–20)
EOSINOPHIL # BLD AUTO: 0.14 X10(3)/MCL (ref 0.04–0.36)
EOSINOPHIL NFR BLD AUTO: 3.3 % (ref 0.7–7)
ERYTHROCYTE [DISTWIDTH] IN BLOOD BY AUTOMATED COUNT: 16 % (ref 11–14.5)
GFR SERPLBLD CREATININE-BSD FMLA CKD-EPI: 60 MLS/MIN/1.73/M2
GLOBULIN SER-MCNC: 2.7 GM/DL (ref 2–3.9)
GLUCOSE SERPL-MCNC: 101 MG/DL (ref 70–115)
HCT VFR BLD AUTO: 25.5 % (ref 36–48)
HGB BLD-MCNC: 9.1 G/DL (ref 11.8–16)
IMM GRANULOCYTES # BLD AUTO: 0.01 X10(3)/MCL (ref 0–0.03)
IMM GRANULOCYTES NFR BLD AUTO: 0.2 % (ref 0–0.5)
LYMPHOCYTES # BLD AUTO: 1.06 X10(3)/MCL (ref 1.16–3.74)
LYMPHOCYTES NFR BLD AUTO: 25.4 % (ref 20–55)
MAGNESIUM SERPL-MCNC: 1.6 MG/DL (ref 1.8–2.4)
MCH RBC QN AUTO: 28.8 PG (ref 27–34)
MCV RBC AUTO: 80.7 FL (ref 79–99)
MEAN CELL HEMOGLOBIN CONCENTRATION (OHS) G/DL: 35.7 G/DL (ref 31–37)
MONOCYTES # BLD AUTO: 0.61 X10(3)/MCL (ref 0.24–0.36)
MONOCYTES NFR BLD AUTO: 14.6 % (ref 4.7–12.5)
NEUTROPHILS # BLD AUTO: 2.32 X10(3)/MCL (ref 1.56–6.13)
NEUTROPHILS NFR BLD AUTO: 55.5 % (ref 37–73)
NRBC BLD AUTO-RTO: 0 % (ref 0–1)
PLATELET # BLD AUTO: 245 X10(3)/MCL (ref 140–371)
PMV BLD AUTO: 12.1 FL (ref 9.4–12.4)
POCT GLUCOSE: 108 MG/DL (ref 70–110)
POTASSIUM SERPL-SCNC: 4.5 MMOL/L (ref 3.5–5.1)
PROT SERPL-MCNC: 5.9 GM/DL (ref 6.3–8.2)
RBC # BLD AUTO: 3.16 X10(6)/MCL (ref 4–5.1)
SODIUM SERPL-SCNC: 137 MMOL/L (ref 135–145)
WBC # SPEC AUTO: 4.2 X10(3)/MCL (ref 4–11.5)

## 2023-03-02 PROCEDURE — 83735 ASSAY OF MAGNESIUM: CPT | Performed by: FAMILY MEDICINE

## 2023-03-02 PROCEDURE — 25000003 PHARM REV CODE 250: Performed by: INTERNAL MEDICINE

## 2023-03-02 PROCEDURE — 85025 COMPLETE CBC W/AUTO DIFF WBC: CPT | Performed by: FAMILY MEDICINE

## 2023-03-02 PROCEDURE — 94761 N-INVAS EAR/PLS OXIMETRY MLT: CPT

## 2023-03-02 PROCEDURE — 25000003 PHARM REV CODE 250: Performed by: FAMILY MEDICINE

## 2023-03-02 PROCEDURE — 25000003 PHARM REV CODE 250: Performed by: NURSE PRACTITIONER

## 2023-03-02 PROCEDURE — 25000003 PHARM REV CODE 250

## 2023-03-02 PROCEDURE — 63600175 PHARM REV CODE 636 W HCPCS: Performed by: FAMILY MEDICINE

## 2023-03-02 PROCEDURE — 36415 COLL VENOUS BLD VENIPUNCTURE: CPT | Performed by: FAMILY MEDICINE

## 2023-03-02 PROCEDURE — 21400001 HC TELEMETRY ROOM

## 2023-03-02 PROCEDURE — 25000242 PHARM REV CODE 250 ALT 637 W/ HCPCS

## 2023-03-02 PROCEDURE — 80053 COMPREHEN METABOLIC PANEL: CPT | Performed by: FAMILY MEDICINE

## 2023-03-02 PROCEDURE — 63600175 PHARM REV CODE 636 W HCPCS

## 2023-03-02 RX ORDER — MAGNESIUM SULFATE HEPTAHYDRATE 40 MG/ML
2 INJECTION, SOLUTION INTRAVENOUS ONCE
Status: COMPLETED | OUTPATIENT
Start: 2023-03-02 | End: 2023-03-02

## 2023-03-02 RX ORDER — LANOLIN ALCOHOL/MO/W.PET/CERES
400 CREAM (GRAM) TOPICAL 2 TIMES DAILY
Status: DISCONTINUED | OUTPATIENT
Start: 2023-03-02 | End: 2023-03-08 | Stop reason: HOSPADM

## 2023-03-02 RX ADMIN — SODIUM CHLORIDE 500 ML: 9 INJECTION, SOLUTION INTRAVENOUS at 05:03

## 2023-03-02 RX ADMIN — LOSARTAN POTASSIUM 50 MG: 50 TABLET, FILM COATED ORAL at 10:03

## 2023-03-02 RX ADMIN — DICYCLOMINE HYDROCHLORIDE 20 MG: 20 TABLET ORAL at 05:03

## 2023-03-02 RX ADMIN — GABAPENTIN 300 MG: 300 CAPSULE ORAL at 10:03

## 2023-03-02 RX ADMIN — ISOSORBIDE MONONITRATE 60 MG: 30 TABLET, EXTENDED RELEASE ORAL at 10:03

## 2023-03-02 RX ADMIN — Medication 2 DROP: at 05:03

## 2023-03-02 RX ADMIN — ASPIRIN 81 MG: 81 TABLET, COATED ORAL at 10:03

## 2023-03-02 RX ADMIN — RANOLAZINE 1000 MG: 500 TABLET, EXTENDED RELEASE ORAL at 10:03

## 2023-03-02 RX ADMIN — ENOXAPARIN SODIUM 60 MG: 60 INJECTION SUBCUTANEOUS at 10:03

## 2023-03-02 RX ADMIN — HYDROCODONE BITARTRATE AND ACETAMINOPHEN 1 TABLET: 5; 325 TABLET ORAL at 03:03

## 2023-03-02 RX ADMIN — FLUTICASONE PROPIONATE 50 MCG: 50 SPRAY, METERED NASAL at 10:03

## 2023-03-02 RX ADMIN — ENOXAPARIN SODIUM 60 MG: 60 INJECTION SUBCUTANEOUS at 09:03

## 2023-03-02 RX ADMIN — GABAPENTIN 300 MG: 300 CAPSULE ORAL at 03:03

## 2023-03-02 RX ADMIN — ONDANSETRON 4 MG: 2 INJECTION INTRAMUSCULAR; INTRAVENOUS at 02:03

## 2023-03-02 RX ADMIN — RANOLAZINE 1000 MG: 500 TABLET, EXTENDED RELEASE ORAL at 09:03

## 2023-03-02 RX ADMIN — Medication 1 TABLET: at 10:03

## 2023-03-02 RX ADMIN — Medication 2 DROP: at 01:03

## 2023-03-02 RX ADMIN — MAGNESIUM SULFATE HEPTAHYDRATE 2 G: 40 INJECTION, SOLUTION INTRAVENOUS at 01:03

## 2023-03-02 RX ADMIN — DICYCLOMINE HYDROCHLORIDE 20 MG: 20 TABLET ORAL at 11:03

## 2023-03-02 RX ADMIN — Medication 2 DROP: at 10:03

## 2023-03-02 RX ADMIN — LINACLOTIDE 72 MCG: 72 CAPSULE, GELATIN COATED ORAL at 10:03

## 2023-03-02 RX ADMIN — GABAPENTIN 300 MG: 300 CAPSULE ORAL at 09:03

## 2023-03-02 RX ADMIN — LABETALOL HYDROCHLORIDE 100 MG: 100 TABLET, FILM COATED ORAL at 10:03

## 2023-03-02 RX ADMIN — PANTOPRAZOLE SODIUM 40 MG: 40 TABLET, DELAYED RELEASE ORAL at 10:03

## 2023-03-02 RX ADMIN — MAGNESIUM OXIDE 400 MG: 400 TABLET ORAL at 05:03

## 2023-03-02 RX ADMIN — ATORVASTATIN CALCIUM 10 MG: 10 TABLET, FILM COATED ORAL at 10:03

## 2023-03-02 RX ADMIN — Medication 2 DROP: at 09:03

## 2023-03-02 NOTE — ASSESSMENT & PLAN NOTE
Pt had episode of severe hypotension associated with getting up in the middle of the night  Will discuss with [facility]/Dr. Silva to adjust medications for her heart  Get orthostatic vital signs q shift

## 2023-03-02 NOTE — PROGRESS NOTES
[facility]/Surg  [facility] Medicine  Progress Note    Patient Name: Martha Caraballo  MRN: [id number]  Patient Class: IP- Inpatient   Admission Date: 2/27/2023  Length of Stay: 2 days  Attending Physician: Maria Del Rosario Reagan MD  Primary Care Provider: Jordan Sanchez MD        Subjective:     Principal Problem:Chest pain        HPI:  Patient is a 89 y.o. female pt of Dr. Sanchez in ,  with a past medical history of moderate aortic stenosis, hypertension, hyperlipidemia, TIA, gastroesophageal reflux disease, osteoarthritis and coronary artery disease, unclear if she had any intervention in the past presents to the ER on account of left-sided chest pain over the past few days.    Patient has been at her usual state of health until about 6 days ago when she developed left-sided chest pain, pressure-like, about 5/10 in intensity, radiating to the left upper extremities with no aggravating or relieving factor.  She admits occasional shortness of breath.  No dizziness or loss of consciousness.  She decided to come to the ER for further evaluation.    At the ER, cardiac enzymes not remarkable.  Chest x-ray shows no acute findings      Overview/Hospital Course:   02/28/2023 spoke with Dr. Silva, wants therapeutic dose lovenox x 48 hrs, add amlodipine and increased ranexa.  Current cardiac enzymes are negative.  Pt does admit to some 2-3/10 left sided chest pain.  03/01/2023 troponin have been negative, no chest pain reproducible component left shoulder pain  03/02/2023 troponin negative, no further chest pain, pt had episode of dizziness and confusion associated with transient low BP when she got up at 0300 am overnight to ambulate to the bathroom, called on call telemed and gave 500cc bolus of ivf and her symtpoms have imrpoved. No symptoms at present and she is back to her baseline mental status.      Interval History:      Review of Systems   Constitutional:  Negative for activity change, appetite change  and fever.   Respiratory:  Negative for chest tightness, shortness of breath and wheezing.    Cardiovascular:  Negative for chest pain.   Gastrointestinal:  Negative for abdominal pain, constipation, diarrhea, nausea and vomiting.   Genitourinary:  Negative for dysuria.   Musculoskeletal:  Positive for arthralgias (left shoulder).   Skin:  Negative for rash and wound.   Neurological:  Positive for dizziness, speech difficulty and light-headedness. Negative for tremors and headaches.        Had episode associated with lower blood pressure 0300 am   Psychiatric/Behavioral:  Positive for confusion.    Objective:     Vital Signs (Most Recent):  Temp: 99 °F (37.2 °C) (03/02/23 1416)  Pulse: 65 (03/02/23 1416)  Resp: 18 (03/02/23 1554)  BP: 139/67 (03/02/23 1416)  SpO2: 98 % (03/02/23 1416) Vital Signs (24h Range):  Temp:  [97.4 °F (36.3 °C)-99.1 °F (37.3 °C)] 99 °F (37.2 °C)  Pulse:  [65-76] 65  Resp:  [16-20] 18  SpO2:  [93 %-100 %] 98 %  BP: ()/(38-70) 139/67     Weight: 59.9 kg (132 lb 1 oz)  Body mass index is 21.32 kg/m².    Intake/Output Summary (Last 24 hours) at 3/2/2023 1644  Last data filed at 3/2/2023 1351  Gross per 24 hour   Intake 720 ml   Output 100 ml   Net 620 ml        Physical Exam  Constitutional:       General: She is not in acute distress.     Appearance: Normal appearance. She is normal weight. She is not ill-appearing.   HENT:      Head: Normocephalic and atraumatic.   Eyes:      General: No scleral icterus.     Extraocular Movements: Extraocular movements intact.   Cardiovascular:      Rate and Rhythm: Normal rate and regular rhythm.      Pulses: Normal pulses.      Heart sounds: Normal heart sounds.   Pulmonary:      Effort: Pulmonary effort is normal.      Breath sounds: Normal breath sounds.   Abdominal:      General: Abdomen is flat. Bowel sounds are normal.      Palpations: Abdomen is soft.   Skin:     General: Skin is warm and dry.      Capillary Refill: Capillary refill takes less  than 2 seconds.      Findings: No erythema, lesion or rash.   Neurological:      General: No focal deficit present.      Mental Status: She is alert and oriented to person, place, and time.   Psychiatric:         Mood and Affect: Mood normal.         Behavior: Behavior normal.         Thought Content: Thought content normal.       Significant Labs: All pertinent labs within the past 24 hours have been reviewed.  CBC:   Recent Labs   Lab 03/01/23 0439 03/02/23  0444   WBC 4.9 4.2   HGB 9.4* 9.1*   HCT 27.0* 25.5*    245       CMP:   Recent Labs   Lab 03/01/23 0439 03/02/23 0444    137   K 4.4 4.5   CO2 26 26   BUN 17.0 18.0   CREATININE 0.80 0.90   CALCIUM 9.0 8.5   ALBUMIN 3.2* 3.2*   BILITOT 0.2 0.5   ALKPHOS 37* 39*   AST 19 17   ALT <4 <4         Significant Imaging: I have reviewed all pertinent imaging results/findings within the past 24 hours.      Assessment/Plan:      * Chest pain  Trend cardiac enzymes  troponins have been negative  No chest pain      Orthostatic hypotension  Pt had episode of severe hypotension associated with getting up in the middle of the night  Will discuss with [facility]/Dr. Silva to adjust medications for her heart  Get orthostatic vital signs q shift      Coronary artery disease  [facility] consulted and trend enzymes  Increased ranexa  Continue statin  lovenox high dose  Continue amlodipine      Hypomagnesemia  Magnesium reviewed- Recent Labs   Lab 03/01/23 0439   MG 1.70*      Will replace magnesium and continue to monitor.  Gave another 2gm iv and will add po replacement           Aortic valve stenosis  Trend cardiac enxymes  [facility] following      Iron deficiency anemia     Iron sat is 19%  V enofer daily    HTN (hypertension)  Chronic, controlled.  Latest blood pressure and vitals reviewed-   Temp:  [97.1 °F (36.2 °C)-98.3 °F (36.8 °C)]   Pulse:  [63-86]   Resp:  [16-20]   BP: (128-172)/(58-95)   SpO2:  [96 %-99 %] .   Home meds for hypertension were reviewed and noted below.    Hypertension Medications             isosorbide mononitrate (IMDUR) 60 MG 24 hr tablet TAKE 1 TABLET BY MOUTH ONCE DAILY    labetaloL (NORMODYNE) 100 MG tablet TAKE 1 TABLET BY MOUTH TWICE A DAY    losartan (COZAAR) 50 MG tablet Take 50 mg by mouth once daily.    nitroGLYCERIN (NITROSTAT) 0.4 MG SL tablet Place 1 tablet (0.4 mg total) under the tongue every 5 (five) minutes as needed for Chest pain.          While in the hospital, will manage blood pressure as follows; Continue home antihypertensive regimen    Will utilize p.r.n. blood pressure medication only if patient's blood pressure greater than  180/110 and she develops symptoms such as worsening chest pain or shortness of breath.        HLD (hyperlipidemia)   Patient is chronically on statin.will continue for now. Monitor clinically. Last LDL was   Lab Results   Component Value Date    LDLCALC 59 12/21/2022              VTE Risk Mitigation (From admission, onward)         Ordered     enoxaparin injection 60 mg  Every 12 hours (non-standard times)         03/01/23 0852     IP VTE HIGH RISK PATIENT  Once         02/28/23 0152     Place sequential compression device  Until discontinued         02/28/23 0152                Discharge Planning   JAN: 3/3/2023     Code Status: Full Code   Is the patient medically ready for discharge?:     Reason for patient still in hospital (select all that apply): Patient trending condition, Treatment and Consult recommendations  Discharge Plan A: Home Health                  Maria Del Rosario Reagan MD  Department of Hospital Medicine   [facility]

## 2023-03-02 NOTE — SUBJECTIVE & OBJECTIVE
Interval History:      Review of Systems   Constitutional:  Negative for activity change, appetite change and fever.   Respiratory:  Negative for chest tightness, shortness of breath and wheezing.    Cardiovascular:  Negative for chest pain.   Gastrointestinal:  Negative for abdominal pain, constipation, diarrhea, nausea and vomiting.   Genitourinary:  Negative for dysuria.   Musculoskeletal:  Positive for arthralgias (left shoulder).   Skin:  Negative for rash and wound.   Neurological:  Positive for dizziness, speech difficulty and light-headedness. Negative for tremors and headaches.        Had episode associated with lower blood pressure 0300 am   Psychiatric/Behavioral:  Positive for confusion.    Objective:     Vital Signs (Most Recent):  Temp: 99 °F (37.2 °C) (03/02/23 1416)  Pulse: 65 (03/02/23 1416)  Resp: 18 (03/02/23 1554)  BP: 139/67 (03/02/23 1416)  SpO2: 98 % (03/02/23 1416) Vital Signs (24h Range):  Temp:  [97.4 °F (36.3 °C)-99.1 °F (37.3 °C)] 99 °F (37.2 °C)  Pulse:  [65-76] 65  Resp:  [16-20] 18  SpO2:  [93 %-100 %] 98 %  BP: ()/(38-70) 139/67     Weight: 59.9 kg (132 lb 1 oz)  Body mass index is 21.32 kg/m².    Intake/Output Summary (Last 24 hours) at 3/2/2023 1644  Last data filed at 3/2/2023 1351  Gross per 24 hour   Intake 720 ml   Output 100 ml   Net 620 ml        Physical Exam  Constitutional:       General: She is not in acute distress.     Appearance: Normal appearance. She is normal weight. She is not ill-appearing.   HENT:      Head: Normocephalic and atraumatic.   Eyes:      General: No scleral icterus.     Extraocular Movements: Extraocular movements intact.   Cardiovascular:      Rate and Rhythm: Normal rate and regular rhythm.      Pulses: Normal pulses.      Heart sounds: Normal heart sounds.   Pulmonary:      Effort: Pulmonary effort is normal.      Breath sounds: Normal breath sounds.   Abdominal:      General: Abdomen is flat. Bowel sounds are normal.      Palpations: Abdomen  is soft.   Skin:     General: Skin is warm and dry.      Capillary Refill: Capillary refill takes less than 2 seconds.      Findings: No erythema, lesion or rash.   Neurological:      General: No focal deficit present.      Mental Status: She is alert and oriented to person, place, and time.   Psychiatric:         Mood and Affect: Mood normal.         Behavior: Behavior normal.         Thought Content: Thought content normal.       Significant Labs: All pertinent labs within the past 24 hours have been reviewed.  CBC:   Recent Labs   Lab 03/01/23  0439 03/02/23  0444   WBC 4.9 4.2   HGB 9.4* 9.1*   HCT 27.0* 25.5*    245       CMP:   Recent Labs   Lab 03/01/23  0439 03/02/23  0444    137   K 4.4 4.5   CO2 26 26   BUN 17.0 18.0   CREATININE 0.80 0.90   CALCIUM 9.0 8.5   ALBUMIN 3.2* 3.2*   BILITOT 0.2 0.5   ALKPHOS 37* 39*   AST 19 17   ALT <4 <4         Significant Imaging: I have reviewed all pertinent imaging results/findings within the past 24 hours.

## 2023-03-02 NOTE — PLAN OF CARE
Problem: Adult Inpatient Plan of Care  Goal: Plan of Care Review  Outcome: Ongoing, Progressing  Goal: Patient-Specific Goal (Individualized)  Outcome: Ongoing, Progressing  Goal: Absence of Hospital-Acquired Illness or Injury  Outcome: Ongoing, Progressing  Goal: Optimal Comfort and Wellbeing  Outcome: Ongoing, Progressing  Goal: Readiness for Transition of Care  Outcome: Ongoing, Progressing     Problem: Fall Injury Risk  Goal: Absence of Fall and Fall-Related Injury  Outcome: Ongoing, Progressing   Reviewed with patient

## 2023-03-02 NOTE — NURSING
Dr. Carson called;notified of patient's issue with her bp dropping earlier & that now she is having trouble expressing herself, not that she had slurred speech but that she was having trouble finding her words;new order noted

## 2023-03-02 NOTE — NURSING
"Called desk to go to the bathroom;states that she is feeling weak;feels hot all over like she wants to pass out;assisted oob to walk with walker to bathroom but too weak;got bsc & assisted to bsc;urinated, then assisted back to bed;checked ycd=909;checked bp=77/38;once back in bed rechecked pr=816/57;still weak-rechecked xg=297/58;states she is beginning to feel better now but states "this is not me";denied cp or any pain anywhere;will continue to monitor  "

## 2023-03-03 LAB
ALBUMIN SERPL-MCNC: 3.3 G/DL (ref 3.4–5)
ALBUMIN/GLOB SERPL: 1.2 RATIO
ALP SERPL-CCNC: 42 UNIT/L (ref 50–144)
ALT SERPL-CCNC: <4 UNIT/L (ref 1–45)
ANION GAP SERPL CALC-SCNC: 4 MEQ/L (ref 2–13)
AST SERPL-CCNC: 18 UNIT/L (ref 14–36)
BASOPHILS # BLD AUTO: 0.05 X10(3)/MCL (ref 0.01–0.08)
BASOPHILS NFR BLD AUTO: 1.1 % (ref 0.1–1.2)
BILIRUBIN DIRECT+TOT PNL SERPL-MCNC: 0.1 MG/DL (ref 0–1)
BUN SERPL-MCNC: 17 MG/DL (ref 7–20)
CALCIUM SERPL-MCNC: 9 MG/DL (ref 8.4–10.2)
CHLORIDE SERPL-SCNC: 104 MMOL/L (ref 98–110)
CO2 SERPL-SCNC: 27 MMOL/L (ref 21–32)
CREAT SERPL-MCNC: 0.87 MG/DL (ref 0.66–1.25)
CREAT/UREA NIT SERPL: 20 (ref 12–20)
EOSINOPHIL # BLD AUTO: 0.13 X10(3)/MCL (ref 0.04–0.36)
EOSINOPHIL NFR BLD AUTO: 2.8 % (ref 0.7–7)
ERYTHROCYTE [DISTWIDTH] IN BLOOD BY AUTOMATED COUNT: 16.2 % (ref 11–14.5)
GFR SERPLBLD CREATININE-BSD FMLA CKD-EPI: 63 MLS/MIN/1.73/M2
GLOBULIN SER-MCNC: 2.7 GM/DL (ref 2–3.9)
GLUCOSE SERPL-MCNC: 97 MG/DL (ref 70–115)
HCT VFR BLD AUTO: 26.8 % (ref 36–48)
HGB BLD-MCNC: 9.2 G/DL (ref 11.8–16)
IMM GRANULOCYTES # BLD AUTO: 0.01 X10(3)/MCL (ref 0–0.03)
IMM GRANULOCYTES NFR BLD AUTO: 0.2 % (ref 0–0.5)
LYMPHOCYTES # BLD AUTO: 1.04 X10(3)/MCL (ref 1.16–3.74)
LYMPHOCYTES NFR BLD AUTO: 22.3 % (ref 20–55)
MAGNESIUM SERPL-MCNC: 2.1 MG/DL (ref 1.8–2.4)
MCH RBC QN AUTO: 28 PG (ref 27–34)
MCV RBC AUTO: 81.7 FL (ref 79–99)
MEAN CELL HEMOGLOBIN CONCENTRATION (OHS) G/DL: 34.3 G/DL (ref 31–37)
MONOCYTES # BLD AUTO: 0.66 X10(3)/MCL (ref 0.24–0.36)
MONOCYTES NFR BLD AUTO: 14.1 % (ref 4.7–12.5)
NEUTROPHILS # BLD AUTO: 2.78 X10(3)/MCL (ref 1.56–6.13)
NEUTROPHILS NFR BLD AUTO: 59.5 % (ref 37–73)
NRBC BLD AUTO-RTO: 0 % (ref 0–1)
PLATELET # BLD AUTO: 227 X10(3)/MCL (ref 140–371)
PMV BLD AUTO: 12.1 FL (ref 9.4–12.4)
POTASSIUM SERPL-SCNC: 4.8 MMOL/L (ref 3.5–5.1)
PROT SERPL-MCNC: 6 GM/DL (ref 6.3–8.2)
RBC # BLD AUTO: 3.28 X10(6)/MCL (ref 4–5.1)
SODIUM SERPL-SCNC: 135 MMOL/L (ref 135–145)
WBC # SPEC AUTO: 4.7 X10(3)/MCL (ref 4–11.5)

## 2023-03-03 PROCEDURE — 25000003 PHARM REV CODE 250

## 2023-03-03 PROCEDURE — 25000003 PHARM REV CODE 250: Performed by: NURSE PRACTITIONER

## 2023-03-03 PROCEDURE — 21400001 HC TELEMETRY ROOM

## 2023-03-03 PROCEDURE — 36415 COLL VENOUS BLD VENIPUNCTURE: CPT | Performed by: FAMILY MEDICINE

## 2023-03-03 PROCEDURE — 94761 N-INVAS EAR/PLS OXIMETRY MLT: CPT

## 2023-03-03 PROCEDURE — 80053 COMPREHEN METABOLIC PANEL: CPT | Performed by: FAMILY MEDICINE

## 2023-03-03 PROCEDURE — 85025 COMPLETE CBC W/AUTO DIFF WBC: CPT | Performed by: FAMILY MEDICINE

## 2023-03-03 PROCEDURE — 83735 ASSAY OF MAGNESIUM: CPT | Performed by: FAMILY MEDICINE

## 2023-03-03 PROCEDURE — 25000003 PHARM REV CODE 250: Performed by: FAMILY MEDICINE

## 2023-03-03 PROCEDURE — 25000242 PHARM REV CODE 250 ALT 637 W/ HCPCS

## 2023-03-03 PROCEDURE — 63600175 PHARM REV CODE 636 W HCPCS: Performed by: FAMILY MEDICINE

## 2023-03-03 RX ADMIN — ASPIRIN 81 MG: 81 TABLET, COATED ORAL at 09:03

## 2023-03-03 RX ADMIN — Medication 2 DROP: at 09:03

## 2023-03-03 RX ADMIN — LINACLOTIDE 72 MCG: 72 CAPSULE, GELATIN COATED ORAL at 09:03

## 2023-03-03 RX ADMIN — ISOSORBIDE MONONITRATE 60 MG: 30 TABLET, EXTENDED RELEASE ORAL at 09:03

## 2023-03-03 RX ADMIN — GABAPENTIN 300 MG: 300 CAPSULE ORAL at 03:03

## 2023-03-03 RX ADMIN — MAGNESIUM OXIDE 400 MG: 400 TABLET ORAL at 09:03

## 2023-03-03 RX ADMIN — Medication 2 DROP: at 05:03

## 2023-03-03 RX ADMIN — HYDROCODONE BITARTRATE AND ACETAMINOPHEN 1 TABLET: 5; 325 TABLET ORAL at 09:03

## 2023-03-03 RX ADMIN — ENOXAPARIN SODIUM 60 MG: 60 INJECTION SUBCUTANEOUS at 09:03

## 2023-03-03 RX ADMIN — GABAPENTIN 300 MG: 300 CAPSULE ORAL at 09:03

## 2023-03-03 RX ADMIN — DICYCLOMINE HYDROCHLORIDE 20 MG: 20 TABLET ORAL at 06:03

## 2023-03-03 RX ADMIN — Medication 1 TABLET: at 09:03

## 2023-03-03 RX ADMIN — RANOLAZINE 1000 MG: 500 TABLET, EXTENDED RELEASE ORAL at 09:03

## 2023-03-03 RX ADMIN — PANTOPRAZOLE SODIUM 40 MG: 40 TABLET, DELAYED RELEASE ORAL at 09:03

## 2023-03-03 RX ADMIN — LABETALOL HYDROCHLORIDE 100 MG: 100 TABLET, FILM COATED ORAL at 09:03

## 2023-03-03 RX ADMIN — ATORVASTATIN CALCIUM 10 MG: 10 TABLET, FILM COATED ORAL at 09:03

## 2023-03-03 RX ADMIN — FLUTICASONE PROPIONATE 50 MCG: 50 SPRAY, METERED NASAL at 09:03

## 2023-03-03 RX ADMIN — Medication 2 DROP: at 06:03

## 2023-03-03 NOTE — ASSESSMENT & PLAN NOTE
Definitely orthostatic on BP, HR 70-80 no significant change  Dr. Silva recommends continue imdur and labetolol hold losartan and amlodipine

## 2023-03-03 NOTE — SUBJECTIVE & OBJECTIVE
Interval History:      Review of Systems   Constitutional:  Negative for activity change, appetite change and fever.   Respiratory:  Negative for chest tightness, shortness of breath and wheezing.    Cardiovascular:  Negative for chest pain.   Gastrointestinal:  Negative for abdominal pain, constipation, diarrhea, nausea and vomiting.   Genitourinary:  Negative for dysuria.   Musculoskeletal:  Positive for arthralgias (left shoulder).   Skin:  Negative for rash and wound.   Neurological:  Positive for dizziness, speech difficulty and light-headedness. Negative for tremors and headaches.        Had episode associated with lower blood pressure 0300 am   Psychiatric/Behavioral:  Positive for confusion.    Objective:     Vital Signs (Most Recent):  Temp: 97.8 °F (36.6 °C) (03/03/23 1104)  Pulse: 87 (03/03/23 1221)  Resp: 18 (03/03/23 1221)  BP: (!) 95/49 (03/03/23 1221)  SpO2: 99 % (03/03/23 1221) Vital Signs (24h Range):  Temp:  [97.8 °F (36.6 °C)-99 °F (37.2 °C)] 97.8 °F (36.6 °C)  Pulse:  [65-87] 87  Resp:  [16-18] 18  SpO2:  [92 %-99 %] 99 %  BP: ()/(49-67) 95/49     Weight: 132 kg (291 lb 0.1 oz)  Body mass index is 46.97 kg/m².    Intake/Output Summary (Last 24 hours) at 3/3/2023 1408  Last data filed at 3/3/2023 1103  Gross per 24 hour   Intake 240 ml   Output 1150 ml   Net -910 ml        Physical Exam  Constitutional:       General: She is not in acute distress.     Appearance: Normal appearance. She is normal weight. She is not ill-appearing.   HENT:      Head: Normocephalic and atraumatic.   Eyes:      General: No scleral icterus.     Extraocular Movements: Extraocular movements intact.   Cardiovascular:      Rate and Rhythm: Normal rate and regular rhythm.      Pulses: Normal pulses.      Heart sounds: Normal heart sounds.   Pulmonary:      Effort: Pulmonary effort is normal.      Breath sounds: Normal breath sounds.   Abdominal:      General: Abdomen is flat. Bowel sounds are normal.      Palpations:  Abdomen is soft.   Skin:     General: Skin is warm and dry.      Capillary Refill: Capillary refill takes less than 2 seconds.      Findings: No erythema, lesion or rash.   Neurological:      General: No focal deficit present.      Mental Status: She is alert and oriented to person, place, and time.   Psychiatric:         Mood and Affect: Mood normal.         Behavior: Behavior normal.         Thought Content: Thought content normal.       Significant Labs: All pertinent labs within the past 24 hours have been reviewed.  CBC:   Recent Labs   Lab 03/02/23 0444 03/03/23  0527   WBC 4.2 4.7   HGB 9.1* 9.2*   HCT 25.5* 26.8*    227       CMP:   Recent Labs   Lab 03/02/23 0444 03/03/23  0526    135   K 4.5 4.8   CO2 26 27   BUN 18.0 17.0   CREATININE 0.90 0.87   CALCIUM 8.5 9.0   ALBUMIN 3.2* 3.3*   BILITOT 0.5 0.1   ALKPHOS 39* 42*   AST 17 18   ALT <4 <4         Significant Imaging: I have reviewed all pertinent imaging results/findings within the past 24 hours.

## 2023-03-03 NOTE — PROGRESS NOTES
[facility]/Surg  [facility] Medicine  Progress Note    Patient Name: Martha Caraballo  MRN: [id number]  Patient Class: IP- Inpatient   Admission Date: 2023  Length of Stay: 3 days  Attending Physician: Maria Del Rosario Reagan MD  Primary Care Provider: Jordan Sanchez MD        Subjective:     Principal Problem:Chest pain        HPI:  Patient is a 89 y.o. female pt of Dr. Sanchez in ,  with a past medical history of moderate aortic stenosis, hypertension, hyperlipidemia, TIA, gastroesophageal reflux disease, osteoarthritis and coronary artery disease, unclear if she had any intervention in the past presents to the ER on account of left-sided chest pain over the past few days.    Patient has been at her usual state of health until about 6 days ago when she developed left-sided chest pain, pressure-like, about 5/10 in intensity, radiating to the left upper extremities with no aggravating or relieving factor.  She admits occasional shortness of breath.  No dizziness or loss of consciousness.  She decided to come to the ER for further evaluation.    At the ER, cardiac enzymes not remarkable.  Chest x-ray shows no acute findings      Overview/Hospital Course:   2023 spoke with Dr. Silva, wants therapeutic dose lovenox x 48 hrs, add amlodipine and increased ranexa.  Current cardiac enzymes are negative.  Pt does admit to some 2-3/10 left sided chest pain.  2023 troponin have been negative, no chest pain reproducible component left shoulder pain  2023 troponin negative, no further chest pain, pt had episode of dizziness and confusion associated with transient low BP when she got up at 0300 am overnight to ambulate to the bathroom, called on call telemed and gave 500cc bolus of ivf and her symtpoms have imrpoved. No symptoms at present and she is back to her baseline mental status.  2023 pt still experiencing some fluctuations in BP.  Orthostatic vitals   Llyin/58  Sitting:  118/57  Standin/49  spoke with DR. Silva recommends continue imdur and labetolol d/c losartan and amlodipine      Interval History:      Review of Systems   Constitutional:  Negative for activity change, appetite change and fever.   Respiratory:  Negative for chest tightness, shortness of breath and wheezing.    Cardiovascular:  Negative for chest pain.   Gastrointestinal:  Negative for abdominal pain, constipation, diarrhea, nausea and vomiting.   Genitourinary:  Negative for dysuria.   Musculoskeletal:  Positive for arthralgias (left shoulder).   Skin:  Negative for rash and wound.   Neurological:  Positive for dizziness, speech difficulty and light-headedness. Negative for tremors and headaches.        Had episode associated with lower blood pressure 0300 am   Psychiatric/Behavioral:  Positive for confusion.    Objective:     Vital Signs (Most Recent):  Temp: 97.8 °F (36.6 °C) (23 1104)  Pulse: 87 (23 1221)  Resp: 18 (23 1221)  BP: (!) 95/49 (23 1221)  SpO2: 99 % (23 1221) Vital Signs (24h Range):  Temp:  [97.8 °F (36.6 °C)-99 °F (37.2 °C)] 97.8 °F (36.6 °C)  Pulse:  [65-87] 87  Resp:  [16-18] 18  SpO2:  [92 %-99 %] 99 %  BP: ()/(49-67) 95/49     Weight: 132 kg (291 lb 0.1 oz)  Body mass index is 46.97 kg/m².    Intake/Output Summary (Last 24 hours) at 3/3/2023 1408  Last data filed at 3/3/2023 1103  Gross per 24 hour   Intake 240 ml   Output 1150 ml   Net -910 ml        Physical Exam  Constitutional:       General: She is not in acute distress.     Appearance: Normal appearance. She is normal weight. She is not ill-appearing.   HENT:      Head: Normocephalic and atraumatic.   Eyes:      General: No scleral icterus.     Extraocular Movements: Extraocular movements intact.   Cardiovascular:      Rate and Rhythm: Normal rate and regular rhythm.      Pulses: Normal pulses.      Heart sounds: Normal heart sounds.   Pulmonary:      Effort: Pulmonary effort is normal.       Breath sounds: Normal breath sounds.   Abdominal:      General: Abdomen is flat. Bowel sounds are normal.      Palpations: Abdomen is soft.   Skin:     General: Skin is warm and dry.      Capillary Refill: Capillary refill takes less than 2 seconds.      Findings: No erythema, lesion or rash.   Neurological:      General: No focal deficit present.      Mental Status: She is alert and oriented to person, place, and time.   Psychiatric:         Mood and Affect: Mood normal.         Behavior: Behavior normal.         Thought Content: Thought content normal.       Significant Labs: All pertinent labs within the past 24 hours have been reviewed.  CBC:   Recent Labs   Lab 03/02/23 0444 03/03/23 0527   WBC 4.2 4.7   HGB 9.1* 9.2*   HCT 25.5* 26.8*    227       CMP:   Recent Labs   Lab 03/02/23 0444 03/03/23  0526    135   K 4.5 4.8   CO2 26 27   BUN 18.0 17.0   CREATININE 0.90 0.87   CALCIUM 8.5 9.0   ALBUMIN 3.2* 3.3*   BILITOT 0.5 0.1   ALKPHOS 39* 42*   AST 17 18   ALT <4 <4         Significant Imaging: I have reviewed all pertinent imaging results/findings within the past 24 hours.      Assessment/Plan:      * Chest pain  Trend cardiac enzymes  troponins have been negative  No chest pain      Orthostatic hypotension  Definitely orthostatic on BP, HR 70-80 no significant change  Dr. Silva recommends continue imdur and labetolol hold losartan and amlodipine      Coronary artery disease  CIS consulted and trend enzymes  Increased ranexa  Continue statin  lovenox high dose  Continue amlodipine      Hypomagnesemia  Magnesium reviewed- Recent Labs   Lab 03/01/23  0439   MG 1.70*      Will replace magnesium and continue to monitor.  Gave another 2gm iv and will add po replacement           Aortic valve stenosis  Trend cardiac enxymes  CIS following      Iron deficiency anemia     Iron sat is 19%  V enofer daily    HTN (hypertension)  Chronic, controlled.  Latest blood pressure and vitals reviewed-   Temp:   [97.1 °F (36.2 °C)-98.3 °F (36.8 °C)]   Pulse:  [63-86]   Resp:  [16-20]   BP: (128-172)/(58-95)   SpO2:  [96 %-99 %] .   Home meds for hypertension were reviewed and noted below.   Hypertension Medications             isosorbide mononitrate (IMDUR) 60 MG 24 hr tablet TAKE 1 TABLET BY MOUTH ONCE DAILY    labetaloL (NORMODYNE) 100 MG tablet TAKE 1 TABLET BY MOUTH TWICE A DAY    losartan (COZAAR) 50 MG tablet Take 50 mg by mouth once daily.    nitroGLYCERIN (NITROSTAT) 0.4 MG SL tablet Place 1 tablet (0.4 mg total) under the tongue every 5 (five) minutes as needed for Chest pain.          While in the hospital, will manage blood pressure as follows; Continue home antihypertensive regimen    Will utilize p.r.n. blood pressure medication only if patient's blood pressure greater than  180/110 and she develops symptoms such as worsening chest pain or shortness of breath.        HLD (hyperlipidemia)   Patient is chronically on statin.will continue for now. Monitor clinically. Last LDL was   Lab Results   Component Value Date    LDLCALC 59 12/21/2022              VTE Risk Mitigation (From admission, onward)         Ordered     enoxaparin injection 60 mg  Every 12 hours (non-standard times)         03/01/23 0852     IP VTE HIGH RISK PATIENT  Once         02/28/23 0152     Place sequential compression device  Until discontinued         02/28/23 0152                Discharge Planning   JAN: 3/3/2023     Code Status: Full Code   Is the patient medically ready for discharge?:     Reason for patient still in hospital (select all that apply): Patient trending condition, Treatment and Consult recommendations  Discharge Plan A: Home Health                  Maria Del Rosario Reagan MD  Department of Hospital Medicine   [facility]

## 2023-03-04 PROCEDURE — 94761 N-INVAS EAR/PLS OXIMETRY MLT: CPT

## 2023-03-04 PROCEDURE — 97530 THERAPEUTIC ACTIVITIES: CPT

## 2023-03-04 PROCEDURE — 25000003 PHARM REV CODE 250: Performed by: NURSE PRACTITIONER

## 2023-03-04 PROCEDURE — 25000003 PHARM REV CODE 250: Performed by: FAMILY MEDICINE

## 2023-03-04 PROCEDURE — 25000242 PHARM REV CODE 250 ALT 637 W/ HCPCS

## 2023-03-04 PROCEDURE — 63600175 PHARM REV CODE 636 W HCPCS: Performed by: FAMILY MEDICINE

## 2023-03-04 PROCEDURE — 21400001 HC TELEMETRY ROOM

## 2023-03-04 PROCEDURE — 97161 PT EVAL LOW COMPLEX 20 MIN: CPT

## 2023-03-04 PROCEDURE — 25000003 PHARM REV CODE 250

## 2023-03-04 RX ADMIN — ATORVASTATIN CALCIUM 10 MG: 10 TABLET, FILM COATED ORAL at 08:03

## 2023-03-04 RX ADMIN — Medication 2 DROP: at 10:03

## 2023-03-04 RX ADMIN — DICYCLOMINE HYDROCHLORIDE 20 MG: 20 TABLET ORAL at 05:03

## 2023-03-04 RX ADMIN — MAGNESIUM OXIDE 400 MG: 400 TABLET ORAL at 08:03

## 2023-03-04 RX ADMIN — LINACLOTIDE 72 MCG: 72 CAPSULE, GELATIN COATED ORAL at 08:03

## 2023-03-04 RX ADMIN — Medication 2 DROP: at 06:03

## 2023-03-04 RX ADMIN — ENOXAPARIN SODIUM 60 MG: 60 INJECTION SUBCUTANEOUS at 08:03

## 2023-03-04 RX ADMIN — GABAPENTIN 300 MG: 300 CAPSULE ORAL at 08:03

## 2023-03-04 RX ADMIN — FLUTICASONE PROPIONATE 50 MCG: 50 SPRAY, METERED NASAL at 08:03

## 2023-03-04 RX ADMIN — Medication 2 DROP: at 02:03

## 2023-03-04 RX ADMIN — LABETALOL HYDROCHLORIDE 100 MG: 100 TABLET, FILM COATED ORAL at 08:03

## 2023-03-04 RX ADMIN — ASPIRIN 81 MG: 81 TABLET, COATED ORAL at 08:03

## 2023-03-04 RX ADMIN — GABAPENTIN 300 MG: 300 CAPSULE ORAL at 02:03

## 2023-03-04 RX ADMIN — Medication 2 DROP: at 05:03

## 2023-03-04 RX ADMIN — Medication 1 TABLET: at 08:03

## 2023-03-04 RX ADMIN — Medication 2 DROP: at 09:03

## 2023-03-04 RX ADMIN — PANTOPRAZOLE SODIUM 40 MG: 40 TABLET, DELAYED RELEASE ORAL at 08:03

## 2023-03-04 RX ADMIN — RANOLAZINE 1000 MG: 500 TABLET, EXTENDED RELEASE ORAL at 08:03

## 2023-03-04 RX ADMIN — ISOSORBIDE MONONITRATE 60 MG: 30 TABLET, EXTENDED RELEASE ORAL at 08:03

## 2023-03-04 NOTE — SUBJECTIVE & OBJECTIVE
Interval History:      Review of Systems   Constitutional:  Negative for activity change, appetite change and fever.   Respiratory:  Negative for chest tightness, shortness of breath and wheezing.    Cardiovascular:  Negative for chest pain.   Gastrointestinal:  Negative for abdominal pain, constipation, diarrhea, nausea and vomiting.   Genitourinary:  Negative for dysuria.   Musculoskeletal:  Positive for arthralgias (left shoulder).   Skin:  Negative for rash and wound.   Neurological:  Positive for dizziness, speech difficulty and light-headedness. Negative for tremors and headaches.        Had episode associated with lower blood pressure 0300 am   Psychiatric/Behavioral:  Positive for confusion.    Objective:     Vital Signs (Most Recent):  Temp: 97.6 °F (36.4 °C) (03/04/23 1040)  Pulse: 70 (03/04/23 1040)  Resp: 16 (03/04/23 1040)  BP: (!) 72/49 (03/04/23 1042)  SpO2: 99 % (03/04/23 1040) Vital Signs (24h Range):  Temp:  [97.6 °F (36.4 °C)-98.7 °F (37.1 °C)] 97.6 °F (36.4 °C)  Pulse:  [68-87] 70  Resp:  [16-19] 16  SpO2:  [94 %-99 %] 99 %  BP: ()/(40-92) 72/49     Weight: 133 kg (293 lb 3.4 oz)  Body mass index is 47.33 kg/m².    Intake/Output Summary (Last 24 hours) at 3/4/2023 1120  Last data filed at 3/3/2023 2219  Gross per 24 hour   Intake 220 ml   Output --   Net 220 ml        Physical Exam  Constitutional:       General: She is not in acute distress.     Appearance: Normal appearance. She is normal weight. She is not ill-appearing.   HENT:      Head: Normocephalic and atraumatic.   Eyes:      General: No scleral icterus.     Extraocular Movements: Extraocular movements intact.   Cardiovascular:      Rate and Rhythm: Normal rate and regular rhythm.      Pulses: Normal pulses.      Heart sounds: Normal heart sounds.   Pulmonary:      Effort: Pulmonary effort is normal.      Breath sounds: Normal breath sounds.   Abdominal:      General: Abdomen is flat. Bowel sounds are normal.      Palpations:  Abdomen is soft.   Skin:     General: Skin is warm and dry.      Capillary Refill: Capillary refill takes less than 2 seconds.      Findings: No erythema, lesion or rash.   Neurological:      General: No focal deficit present.      Mental Status: She is alert and oriented to person, place, and time.   Psychiatric:         Mood and Affect: Mood normal.         Behavior: Behavior normal.         Thought Content: Thought content normal.       Significant Labs: All pertinent labs within the past 24 hours have been reviewed.  CBC:   Recent Labs   Lab 03/03/23  0527   WBC 4.7   HGB 9.2*   HCT 26.8*          CMP:   Recent Labs   Lab 03/03/23  0526      K 4.8   CO2 27   BUN 17.0   CREATININE 0.87   CALCIUM 9.0   ALBUMIN 3.3*   BILITOT 0.1   ALKPHOS 42*   AST 18   ALT <4         Significant Imaging: I have reviewed all pertinent imaging results/findings within the past 24 hours.

## 2023-03-04 NOTE — NURSING
PATIENT CALLED ON THE CALL BELL STATING SHE WAS READY TO GO BACK TO BED. UPON ENTERING ROOM PATIENT STATES THAT SHE NEEDS TO USE THE RESTROOM. PATIENT AMBULATED WITH ASSISTANCE TO RESTROOM WITH WALKER, TOLERATED WELL. UPON RISING FROM THE TOILET PATIENT BECAME WEAK AND WAS THEN LOWERED BACK ONTO THE TOILET. HELP CALLED. PATIENT PUT BACK IN BED WITH 2 PERSON ASSIST. WEAKNESS STILL APPARENT. BP: 106/92. WILL CONTINUE TO MONITOR.

## 2023-03-04 NOTE — PROGRESS NOTES
[facility]/Surg  [facility]  Progress Note    Patient Name: Martha Caraballo  MRN: [id number]  Patient Class: IP- Inpatient   Admission Date: 2023  Length of Stay: 4 days  Attending Physician: Maria Del Rosario Reagan MD  Primary Care Provider: Jordan Sanchez MD        Subjective:     Principal Problem:Chest pain        HPI:  Patient is a 89 y.o. female pt of Dr. Sanchez in ,  with a past medical history of moderate aortic stenosis, hypertension, hyperlipidemia, TIA, gastroesophageal reflux disease, osteoarthritis and coronary artery disease, unclear if she had any intervention in the past presents to the ER on account of left-sided chest pain over the past few days.    Patient has been at her usual state of health until about 6 days ago when she developed left-sided chest pain, pressure-like, about 5/10 in intensity, radiating to the left upper extremities with no aggravating or relieving factor.  She admits occasional shortness of breath.  No dizziness or loss of consciousness.  She decided to come to the ER for further evaluation.    At the ER, cardiac enzymes not remarkable.  Chest x-ray shows no acute findings      Overview/Hospital Course:   2023 spoke with Dr. Silva, wants therapeutic dose lovenox x 48 hrs, add amlodipine and increased ranexa.  Current cardiac enzymes are negative.  Pt does admit to some 2-3/10 left sided chest pain.  2023 troponin have been negative, no chest pain reproducible component left shoulder pain  2023 troponin negative, no further chest pain, pt had episode of dizziness and confusion associated with transient low BP when she got up at 0300 am overnight to ambulate to the bathroom, called on call telemed and gave 500cc bolus of ivf and her symtpoms have imrpoved. No symptoms at present and she is back to her baseline mental status.  2023 pt still experiencing some fluctuations in BP.  Orthostatic vitals   Llyin/58  Sitting:  118/57  Standin/49  spoke with DR. Silva recommends continue imdur and labetolol d/c losartan and amlodipine  2023 patient still with orthostatic hypotension despite discontinuing the losartan and amlodipine.  He is very weak having a hard time even going to the bathroom and doing activities of daily living.  I will put labetalol on hold consult Physical therapy consult for [facility] re[facility] placement      Interval History:      Review of Systems   Constitutional:  Negative for activity change, appetite change and fever.   Respiratory:  Negative for chest tightness, shortness of breath and wheezing.    Cardiovascular:  Negative for chest pain.   Gastrointestinal:  Negative for abdominal pain, constipation, diarrhea, nausea and vomiting.   Genitourinary:  Negative for dysuria.   Musculoskeletal:  Positive for arthralgias (left shoulder).   Skin:  Negative for rash and wound.   Neurological:  Positive for dizziness, speech difficulty and light-headedness. Negative for tremors and headaches.        Had episode associated with lower blood pressure 0300 am   Psychiatric/Behavioral:  Positive for confusion.    Objective:     Vital Signs (Most Recent):  Temp: 97.6 °F (36.4 °C) (23 1040)  Pulse: 70 (23 1040)  Resp: 16 (23 1040)  BP: (!) 72/49 (23 1042)  SpO2: 99 % (23 1040) Vital Signs (24h Range):  Temp:  [97.6 °F (36.4 °C)-98.7 °F (37.1 °C)] 97.6 °F (36.4 °C)  Pulse:  [68-87] 70  Resp:  [16-19] 16  SpO2:  [94 %-99 %] 99 %  BP: ()/(40-92) 72/49     Weight: 133 kg (293 lb 3.4 oz)  Body mass index is 47.33 kg/m².    Intake/Output Summary (Last 24 hours) at 3/4/2023 1120  Last data filed at 3/3/2023 2219  Gross per 24 hour   Intake 220 ml   Output --   Net 220 ml        Physical Exam  Constitutional:       General: She is not in acute distress.     Appearance: Normal appearance. She is normal weight. She is not ill-appearing.   HENT:      Head: Normocephalic and atraumatic.   Eyes:       General: No scleral icterus.     Extraocular Movements: Extraocular movements intact.   Cardiovascular:      Rate and Rhythm: Normal rate and regular rhythm.      Pulses: Normal pulses.      Heart sounds: Normal heart sounds.   Pulmonary:      Effort: Pulmonary effort is normal.      Breath sounds: Normal breath sounds.   Abdominal:      General: Abdomen is flat. Bowel sounds are normal.      Palpations: Abdomen is soft.   Skin:     General: Skin is warm and dry.      Capillary Refill: Capillary refill takes less than 2 seconds.      Findings: No erythema, lesion or rash.   Neurological:      General: No focal deficit present.      Mental Status: She is alert and oriented to person, place, and time.   Psychiatric:         Mood and Affect: Mood normal.         Behavior: Behavior normal.         Thought Content: Thought content normal.       Significant Labs: All pertinent labs within the past 24 hours have been reviewed.  CBC:   Recent Labs   Lab 03/03/23  0527   WBC 4.7   HGB 9.2*   HCT 26.8*          CMP:   Recent Labs   Lab 03/03/23  0526      K 4.8   CO2 27   BUN 17.0   CREATININE 0.87   CALCIUM 9.0   ALBUMIN 3.3*   BILITOT 0.1   ALKPHOS 42*   AST 18   ALT <4         Significant Imaging: I have reviewed all pertinent imaging results/findings within the past 24 hours.      Assessment/Plan:      * Chest pain  Trend cardiac enzymes  troponins have been negative  No chest pain      Orthostatic hypotension  Definitely orthostatic on BP, HR 70-80 no significant change  Dr. Silva recommends continue imdur and labetolol hold losartan and amlodipine  Hold labetalol.  Recheck orthostatics in a.m..    Hypomagnesemia  Magnesium reviewed-   Recent Labs   Lab 03/03/23  0526   MG 2.10      Will replace magnesium and continue to monitor.  Gave another 2gm iv and will add po replacement           HLD (hyperlipidemia)   Patient is chronically on statin.will continue for now. Monitor clinically. Last LDL was    Lab Results   Component Value Date    LDLCALC 59 12/21/2022            Aortic valve stenosis  Trend cardiac enxymes  CIS following      HTN (hypertension)  .  Latest blood pressure and vitals reviewed-   Temp:  [97.6 °F (36.4 °C)-98.7 °F (37.1 °C)]   Pulse:  [68-87]   Resp:  [16-19]   BP: ()/(40-92)   SpO2:  [94 %-99 %] .   Home meds for hypertension were reviewed and noted below.   Hypertension Medications             isosorbide mononitrate (IMDUR) 60 MG 24 hr tablet TAKE 1 TABLET BY MOUTH ONCE DAILY    labetaloL (NORMODYNE) 100 MG tablet TAKE 1 TABLET BY MOUTH TWICE A DAY    losartan (COZAAR) 50 MG tablet Take 50 mg by mouth once daily.    nitroGLYCERIN (NITROSTAT) 0.4 MG SL tablet Place 1 tablet (0.4 mg total) under the tongue every 5 (five) minutes as needed for Chest pain.          While in the hospital, will manage blood pressure as follows; Hold lebetalol  Will utilize p.r.n. blood pressure medication only if patient's blood pressure greater than  180/110 and she develops symptoms such as worsening chest pain or shortness of breath.        Coronary artery disease  CIS consulted and trend enzymes  Increased ranexa  Continue statin  lovenox high dose  Continue amlodipine      Iron deficiency anemia     Iron sat is 19%  V enofer daily      VTE Risk Mitigation (From admission, onward)         Ordered     enoxaparin injection 60 mg  Every 12 hours (non-standard times)         03/01/23 0852     IP VTE HIGH RISK PATIENT  Once         02/28/23 0152     Place sequential compression device  Until discontinued         02/28/23 0152                Discharge Planning   JAN: 3/3/2023     Code Status: Full Code   Is the patient medically ready for discharge?:     Reason for patient still in hospital (select all that apply): Patient unstable  Discharge Plan A: Home Health                  Felix Mckeon MD  Department of Hospital Medicine   [facility]

## 2023-03-04 NOTE — ASSESSMENT & PLAN NOTE
Magnesium reviewed-   Recent Labs   Lab 03/03/23  0523   MG 2.10      Will replace magnesium and continue to monitor.  Gave another 2gm iv and will add po replacement

## 2023-03-04 NOTE — PT/OT/SLP EVAL
"Physical Therapy Evaluation    Patient Name:  Martha Caraballo   MRN:  [id number]    Recommendations:     Discharge Recommendations: home with home health   Discharge Equipment Recommendations: none   Barriers to discharge: None    Assessment:     Martha Caraballo is a 89 y.o. female admitted with a medical diagnosis of Chest pain.  She presents with the following impairments/functional limitations: weakness, impaired functional mobility, impaired balance, decreased lower extremity function Patient with noted dizziness and feeling of "passing out" upon standing. Unable to stand for more than 1-2 minutes at a time. .    Rehab Prognosis: Good; patient would benefit from acute skilled PT services to address these deficits and reach maximum level of function.    Recent Surgery: * No surgery found *      Plan:     During this hospitalization, patient to be seen daily to address the identified rehab impairments via gait training, therapeutic activities, therapeutic exercises and progress toward the following goals:    Plan of Care Expires:  04/04/23    Subjective     Chief Complaint: weakness, dizziness  Patient/Family Comments/goals: to get stronger to go home  Pain/Comfort:  Pain Rating 1: 0/10    Patients cultural, spiritual, Rastafarian conflicts given the current situation:      Living Environment:  Lives alone with occasional caregivers to assist with ADLs    Prior to admission, patients level of function was Independent with most ADLs.  Equipment used at home: walker, standard, walker, rolling, rollator, cane, straight.  DME owned (not currently used): none.  Upon discharge, patient will have assistance from family, caregivers.    Objective:     Communicated with nursing prior to session.  Patient found HOB elevated with peripheral IV  upon PT entry to room.    General Precautions: Standard, fall  Orthopedic Precautions:N/A   Braces: N/A  Respiratory Status: Room air    Exams:  RLE Strength: grossly 3+/5  LLE Strength: " grossly 3+/5    Functional Mobility:  Bed Mobility:     Supine to Sit: contact guard assistance  Transfers:     Sit to Stand:  minimum assistance with standard walker      AM-PAC 6 CLICK MOBILITY  Total Score:15       Treatment & Education:  See above plus seated BLE therex while sitting EOB    Patient left supine with all lines intact, call button in reach, and family present.    GOALS:   Multidisciplinary Problems       Physical Therapy Goals          Problem: Physical Therapy    Goal Priority Disciplines Outcome Goal Variances Interventions   Physical Therapy Goal     PT, PT/OT Ongoing, Progressing     Description: Goals to be met by: discharge     Patient will increase functional independence with mobility by performin. Sit to stand transfer with Supervision and Stand-by Assistance  2. Gait  x 50 feet with Stand-by Assistance using Standard Walker.                          History:     Past Medical History:   Diagnosis Date    Acid reflux     Arthritis     Carpal tunnel syndrome 2018    Cataract     Coronary artery disease 3/15/2019    Dry eyes     Heart murmur     Hyperlipidemia     Hypertension     Transient ischemic attack 2020       Past Surgical History:   Procedure Laterality Date    ADENOIDECTOMY      APPENDECTOMY      CHOLECYSTECTOMY      EYE SURGERY      HYSTERECTOMY      SPLENECTOMY, TOTAL      TONSILLECTOMY         Time Tracking:     PT Received On: 23  PT Start Time: 1158     PT Stop Time: 1221  PT Total Time (min): 23 min     Billable Minutes: Evaluation 15 and Therapeutic Activity 8      2023

## 2023-03-04 NOTE — ASSESSMENT & PLAN NOTE
Definitely orthostatic on BP, HR 70-80 no significant change  Dr. Silva recommends continue imdur and labetolol hold losartan and amlodipine  Hold labetalol.  Recheck orthostatics in a.m..

## 2023-03-05 LAB
ALBUMIN SERPL-MCNC: 3.3 G/DL (ref 3.4–5)
ALBUMIN/GLOB SERPL: 1.2 RATIO
ALP SERPL-CCNC: 40 UNIT/L (ref 50–144)
ALT SERPL-CCNC: <4 UNIT/L (ref 1–45)
ANION GAP SERPL CALC-SCNC: 2 MEQ/L (ref 2–13)
AST SERPL-CCNC: 27 UNIT/L (ref 14–36)
BASOPHILS # BLD AUTO: 0.05 X10(3)/MCL (ref 0.01–0.08)
BASOPHILS NFR BLD AUTO: 0.8 % (ref 0.1–1.2)
BILIRUBIN DIRECT+TOT PNL SERPL-MCNC: 0.4 MG/DL (ref 0–1)
BUN SERPL-MCNC: 22 MG/DL (ref 7–20)
CALCIUM SERPL-MCNC: 8.3 MG/DL (ref 8.4–10.2)
CHLORIDE SERPL-SCNC: 106 MMOL/L (ref 98–110)
CO2 SERPL-SCNC: 27 MMOL/L (ref 21–32)
CREAT SERPL-MCNC: 0.9 MG/DL (ref 0.66–1.25)
CREAT/UREA NIT SERPL: 24 (ref 12–20)
EOSINOPHIL # BLD AUTO: 0.15 X10(3)/MCL (ref 0.04–0.36)
EOSINOPHIL NFR BLD AUTO: 2.4 % (ref 0.7–7)
ERYTHROCYTE [DISTWIDTH] IN BLOOD BY AUTOMATED COUNT: 16.3 % (ref 11–14.5)
GFR SERPLBLD CREATININE-BSD FMLA CKD-EPI: 60 MLS/MIN/1.73/M2
GLOBULIN SER-MCNC: 2.7 GM/DL (ref 2–3.9)
GLUCOSE SERPL-MCNC: 93 MG/DL (ref 70–115)
HCT VFR BLD AUTO: 25.3 % (ref 36–48)
HGB BLD-MCNC: 8.7 G/DL (ref 11.8–16)
IMM GRANULOCYTES # BLD AUTO: 0.01 X10(3)/MCL (ref 0–0.03)
IMM GRANULOCYTES NFR BLD AUTO: 0.2 % (ref 0–0.5)
LYMPHOCYTES # BLD AUTO: 1.28 X10(3)/MCL (ref 1.16–3.74)
LYMPHOCYTES NFR BLD AUTO: 20.3 % (ref 20–55)
MCH RBC QN AUTO: 28.1 PG (ref 27–34)
MCV RBC AUTO: 81.6 FL (ref 79–99)
MEAN CELL HEMOGLOBIN CONCENTRATION (OHS) G/DL: 34.4 G/DL (ref 31–37)
MONOCYTES # BLD AUTO: 0.87 X10(3)/MCL (ref 0.24–0.36)
MONOCYTES NFR BLD AUTO: 13.8 % (ref 4.7–12.5)
NEUTROPHILS # BLD AUTO: 3.96 X10(3)/MCL (ref 1.56–6.13)
NEUTROPHILS NFR BLD AUTO: 62.5 % (ref 37–73)
NRBC BLD AUTO-RTO: 0 % (ref 0–1)
PLATELET # BLD AUTO: 250 X10(3)/MCL (ref 140–371)
PMV BLD AUTO: 11.2 FL (ref 9.4–12.4)
POTASSIUM SERPL-SCNC: 5 MMOL/L (ref 3.5–5.1)
PROT SERPL-MCNC: 6 GM/DL (ref 6.3–8.2)
RBC # BLD AUTO: 3.1 X10(6)/MCL (ref 4–5.1)
SODIUM SERPL-SCNC: 135 MMOL/L (ref 135–145)
WBC # SPEC AUTO: 6.3 X10(3)/MCL (ref 4–11.5)

## 2023-03-05 PROCEDURE — 25000242 PHARM REV CODE 250 ALT 637 W/ HCPCS

## 2023-03-05 PROCEDURE — 80053 COMPREHEN METABOLIC PANEL: CPT | Performed by: INTERNAL MEDICINE

## 2023-03-05 PROCEDURE — 85025 COMPLETE CBC W/AUTO DIFF WBC: CPT | Performed by: INTERNAL MEDICINE

## 2023-03-05 PROCEDURE — 94761 N-INVAS EAR/PLS OXIMETRY MLT: CPT

## 2023-03-05 PROCEDURE — 63600175 PHARM REV CODE 636 W HCPCS: Performed by: FAMILY MEDICINE

## 2023-03-05 PROCEDURE — 21400001 HC TELEMETRY ROOM

## 2023-03-05 PROCEDURE — 25000003 PHARM REV CODE 250: Performed by: FAMILY MEDICINE

## 2023-03-05 PROCEDURE — 25000003 PHARM REV CODE 250

## 2023-03-05 PROCEDURE — 25000003 PHARM REV CODE 250: Performed by: NURSE PRACTITIONER

## 2023-03-05 PROCEDURE — 36415 COLL VENOUS BLD VENIPUNCTURE: CPT | Performed by: INTERNAL MEDICINE

## 2023-03-05 PROCEDURE — 97530 THERAPEUTIC ACTIVITIES: CPT

## 2023-03-05 RX ADMIN — ENOXAPARIN SODIUM 60 MG: 60 INJECTION SUBCUTANEOUS at 08:03

## 2023-03-05 RX ADMIN — FLUTICASONE PROPIONATE 50 MCG: 50 SPRAY, METERED NASAL at 08:03

## 2023-03-05 RX ADMIN — ASPIRIN 81 MG: 81 TABLET, COATED ORAL at 08:03

## 2023-03-05 RX ADMIN — HYDROCODONE BITARTRATE AND ACETAMINOPHEN 1 TABLET: 5; 325 TABLET ORAL at 05:03

## 2023-03-05 RX ADMIN — GABAPENTIN 300 MG: 300 CAPSULE ORAL at 08:03

## 2023-03-05 RX ADMIN — DICYCLOMINE HYDROCHLORIDE 20 MG: 20 TABLET ORAL at 05:03

## 2023-03-05 RX ADMIN — Medication 1 TABLET: at 08:03

## 2023-03-05 RX ADMIN — LINACLOTIDE 72 MCG: 72 CAPSULE, GELATIN COATED ORAL at 08:03

## 2023-03-05 RX ADMIN — Medication 2 DROP: at 09:03

## 2023-03-05 RX ADMIN — ATORVASTATIN CALCIUM 10 MG: 10 TABLET, FILM COATED ORAL at 08:03

## 2023-03-05 RX ADMIN — HYDROCODONE BITARTRATE AND ACETAMINOPHEN 1 TABLET: 5; 325 TABLET ORAL at 08:03

## 2023-03-05 RX ADMIN — RANOLAZINE 1000 MG: 500 TABLET, EXTENDED RELEASE ORAL at 08:03

## 2023-03-05 RX ADMIN — Medication 2 DROP: at 10:03

## 2023-03-05 RX ADMIN — GABAPENTIN 300 MG: 300 CAPSULE ORAL at 02:03

## 2023-03-05 RX ADMIN — MAGNESIUM OXIDE 400 MG: 400 TABLET ORAL at 08:03

## 2023-03-05 RX ADMIN — ISOSORBIDE MONONITRATE 60 MG: 30 TABLET, EXTENDED RELEASE ORAL at 08:03

## 2023-03-05 RX ADMIN — PANTOPRAZOLE SODIUM 40 MG: 40 TABLET, DELAYED RELEASE ORAL at 08:03

## 2023-03-05 RX ADMIN — Medication 2 DROP: at 02:03

## 2023-03-05 RX ADMIN — Medication 2 DROP: at 05:03

## 2023-03-05 NOTE — PROGRESS NOTES
[facility]/Surg  [facility] Medicine  Progress Note    Patient Name: Martha Caraballo  MRN: [id number]  Patient Class: IP- Inpatient   Admission Date: 2023  Length of Stay: 5 days  Attending Physician: Maria Del Rosario Reagan MD  Primary Care Provider: Jordan Sanchez MD        Subjective:     Principal Problem:Chest pain        HPI:  Patient is a 89 y.o. female pt of Dr. Sanchez in ,  with a past medical history of moderate aortic stenosis, hypertension, hyperlipidemia, TIA, gastroesophageal reflux disease, osteoarthritis and coronary artery disease, unclear if she had any intervention in the past presents to the ER on account of left-sided chest pain over the past few days.    Patient has been at her usual state of health until about 6 days ago when she developed left-sided chest pain, pressure-like, about 5/10 in intensity, radiating to the left upper extremities with no aggravating or relieving factor.  She admits occasional shortness of breath.  No dizziness or loss of consciousness.  She decided to come to the ER for further evaluation.    At the ER, cardiac enzymes not remarkable.  Chest x-ray shows no acute findings      Overview/Hospital Course:   2023 spoke with Dr. Silva, wants therapeutic dose lovenox x 48 hrs, add amlodipine and increased ranexa.  Current cardiac enzymes are negative.  Pt does admit to some 2-3/10 left sided chest pain.  2023 troponin have been negative, no chest pain reproducible component left shoulder pain  2023 troponin negative, no further chest pain, pt had episode of dizziness and confusion associated with transient low BP when she got up at 0300 am overnight to ambulate to the bathroom, called on call telemed and gave 500cc bolus of ivf and her symtpoms have imrpoved. No symptoms at present and she is back to her baseline mental status.  2023 pt still experiencing some fluctuations in BP.  Orthostatic vitals   Llyin/58  Sitting:  118/57  Standin/49  spoke with DR. Silva recommends continue imdur and labetolol d/c losartan and amlodipine  2023 patient still with orthostatic hypotension despite discontinuing the losartan and amlodipine.  He is very weak having a hard time even going to the bathroom and doing activities of daily living.  I will put labetalol on hold consult Physical therapy consult for [facility] placement  2023 since discontinuing the labetalol her orthostatic hypotension has resolved.  She is tolerating physical therapy but remains very weak.  Plans are for her to go to rehab tomorrow.      Interval History:      Review of Systems   Constitutional:  Negative for activity change, appetite change and fever.   Respiratory:  Negative for chest tightness, shortness of breath and wheezing.    Cardiovascular:  Negative for chest pain.   Gastrointestinal:  Negative for abdominal pain, constipation, diarrhea, nausea and vomiting.   Genitourinary:  Negative for dysuria.   Musculoskeletal:  Positive for arthralgias (left shoulder).   Skin:  Negative for rash and wound.   Neurological:  Positive for dizziness, speech difficulty and light-headedness. Negative for tremors and headaches.        Had episode associated with lower blood pressure 0300 am   Psychiatric/Behavioral:  Positive for confusion.    Objective:     Vital Signs (Most Recent):  Temp: 97.7 °F (36.5 °C) (23)  Pulse: 76 (23)  Resp: 20 (23)  BP: (!) 153/66 (23)  SpO2: 100 % (23) Vital Signs (24h Range):  Temp:  [97.6 °F (36.4 °C)-99 °F (37.2 °C)] 97.7 °F (36.5 °C)  Pulse:  [69-80] 76  Resp:  [16-20] 20  SpO2:  [90 %-100 %] 100 %  BP: ()/(40-92) 153/66     Weight: 60.6 kg (133 lb 9.6 oz)  Body mass index is 21.56 kg/m².    Intake/Output Summary (Last 24 hours) at 3/5/2023 6455  Last data filed at 3/5/2023 8398  Gross per 24 hour   Intake 500 ml   Output 600 ml   Net -100 ml        Physical  Exam  Constitutional:       General: She is not in acute distress.     Appearance: Normal appearance. She is normal weight. She is not ill-appearing.   HENT:      Head: Normocephalic and atraumatic.   Eyes:      General: No scleral icterus.     Extraocular Movements: Extraocular movements intact.   Cardiovascular:      Rate and Rhythm: Normal rate and regular rhythm.      Pulses: Normal pulses.      Heart sounds: Normal heart sounds.   Pulmonary:      Effort: Pulmonary effort is normal.      Breath sounds: Normal breath sounds.   Abdominal:      General: Abdomen is flat. Bowel sounds are normal.      Palpations: Abdomen is soft.   Skin:     General: Skin is warm and dry.      Capillary Refill: Capillary refill takes less than 2 seconds.      Findings: No erythema, lesion or rash.   Neurological:      General: No focal deficit present.      Mental Status: She is alert and oriented to person, place, and time.   Psychiatric:         Mood and Affect: Mood normal.         Behavior: Behavior normal.         Thought Content: Thought content normal.       Significant Labs: All pertinent labs within the past 24 hours have been reviewed.  CBC:   Recent Labs   Lab 03/05/23  0510   WBC 6.3   HGB 8.7*   HCT 25.3*          CMP:   Recent Labs   Lab 03/05/23  0510      K 5.0   CO2 27   BUN 22.0*   CREATININE 0.90   CALCIUM 8.3*   ALBUMIN 3.3*   BILITOT 0.4   ALKPHOS 40*   AST 27   ALT <4         Significant Imaging: I have reviewed all pertinent imaging results/findings within the past 24 hours.      Assessment/Plan:      * Chest pain  Trend cardiac enzymes  troponins have been negative  No chest pain      Orthostatic hypotension  Definitely orthostatic on BP, HR 70-80 no significant change  Dr. Silva recommends continue imdur and labetolol hold losartan and amlodipine  Hold labetalol.  Recheck orthostatics in a.m..    Hypomagnesemia  Magnesium reviewed-   No results for input(s): MG in the last 48 hours.   Will  replace magnesium and continue to monitor.  Gave another 2gm iv and will add po replacement           HLD (hyperlipidemia)   Patient is chronically on statin.will continue for now. Monitor clinically. Last LDL was   Lab Results   Component Value Date    LDLCALC 59 12/21/2022            Aortic valve stenosis  Trend cardiac enxymes  CIS following      HTN (hypertension)  .  Latest blood pressure and vitals reviewed-   Temp:  [97.6 °F (36.4 °C)-99 °F (37.2 °C)]   Pulse:  [69-80]   Resp:  [16-20]   BP: ()/(40-92)   SpO2:  [90 %-100 %] .   Home meds for hypertension were reviewed and noted below.   Hypertension Medications             isosorbide mononitrate (IMDUR) 60 MG 24 hr tablet TAKE 1 TABLET BY MOUTH ONCE DAILY    labetaloL (NORMODYNE) 100 MG tablet TAKE 1 TABLET BY MOUTH TWICE A DAY    losartan (COZAAR) 50 MG tablet Take 50 mg by mouth once daily.    nitroGLYCERIN (NITROSTAT) 0.4 MG SL tablet Place 1 tablet (0.4 mg total) under the tongue every 5 (five) minutes as needed for Chest pain.          While in the hospital, will manage blood pressure as follows; Hold lebetalol  Will utilize p.r.n. blood pressure medication only if patient's blood pressure greater than  180/110 and she develops symptoms such as worsening chest pain or shortness of breath.        Coronary artery disease  CIS consulted and trend enzymes  Increased ranexa  Continue statin  lovenox high dose  Continue amlodipine      Iron deficiency anemia     Iron sat is 19%  V enofer daily      VTE Risk Mitigation (From admission, onward)         Ordered     enoxaparin injection 60 mg  Every 12 hours (non-standard times)         03/01/23 0852     IP VTE HIGH RISK PATIENT  Once         02/28/23 0152     Place sequential compression device  Until discontinued         02/28/23 0152                Discharge Planning   JAN:      Code Status: Full Code   Is the patient medically ready for discharge?:     Reason for patient still in hospital (select all  that apply): Patient unstable  Discharge Plan A: Home Health                  Felix Mckeon MD  Department of Hospital Medicine   [facility]/Surg

## 2023-03-05 NOTE — SUBJECTIVE & OBJECTIVE
Interval History:      Review of Systems   Constitutional:  Negative for activity change, appetite change and fever.   Respiratory:  Negative for chest tightness, shortness of breath and wheezing.    Cardiovascular:  Negative for chest pain.   Gastrointestinal:  Negative for abdominal pain, constipation, diarrhea, nausea and vomiting.   Genitourinary:  Negative for dysuria.   Musculoskeletal:  Positive for arthralgias (left shoulder).   Skin:  Negative for rash and wound.   Neurological:  Positive for dizziness, speech difficulty and light-headedness. Negative for tremors and headaches.        Had episode associated with lower blood pressure 0300 am   Psychiatric/Behavioral:  Positive for confusion.    Objective:     Vital Signs (Most Recent):  Temp: 97.7 °F (36.5 °C) (03/05/23 0825)  Pulse: 76 (03/05/23 0825)  Resp: 20 (03/05/23 0825)  BP: (!) 153/66 (03/05/23 0825)  SpO2: 100 % (03/05/23 0825) Vital Signs (24h Range):  Temp:  [97.6 °F (36.4 °C)-99 °F (37.2 °C)] 97.7 °F (36.5 °C)  Pulse:  [69-80] 76  Resp:  [16-20] 20  SpO2:  [90 %-100 %] 100 %  BP: ()/(40-92) 153/66     Weight: 60.6 kg (133 lb 9.6 oz)  Body mass index is 21.56 kg/m².    Intake/Output Summary (Last 24 hours) at 3/5/2023 0997  Last data filed at 3/5/2023 0458  Gross per 24 hour   Intake 500 ml   Output 600 ml   Net -100 ml        Physical Exam  Constitutional:       General: She is not in acute distress.     Appearance: Normal appearance. She is normal weight. She is not ill-appearing.   HENT:      Head: Normocephalic and atraumatic.   Eyes:      General: No scleral icterus.     Extraocular Movements: Extraocular movements intact.   Cardiovascular:      Rate and Rhythm: Normal rate and regular rhythm.      Pulses: Normal pulses.      Heart sounds: Normal heart sounds.   Pulmonary:      Effort: Pulmonary effort is normal.      Breath sounds: Normal breath sounds.   Abdominal:      General: Abdomen is flat. Bowel sounds are normal.       Palpations: Abdomen is soft.   Skin:     General: Skin is warm and dry.      Capillary Refill: Capillary refill takes less than 2 seconds.      Findings: No erythema, lesion or rash.   Neurological:      General: No focal deficit present.      Mental Status: She is alert and oriented to person, place, and time.   Psychiatric:         Mood and Affect: Mood normal.         Behavior: Behavior normal.         Thought Content: Thought content normal.       Significant Labs: All pertinent labs within the past 24 hours have been reviewed.  CBC:   Recent Labs   Lab 03/05/23  0510   WBC 6.3   HGB 8.7*   HCT 25.3*          CMP:   Recent Labs   Lab 03/05/23  0510      K 5.0   CO2 27   BUN 22.0*   CREATININE 0.90   CALCIUM 8.3*   ALBUMIN 3.3*   BILITOT 0.4   ALKPHOS 40*   AST 27   ALT <4         Significant Imaging: I have reviewed all pertinent imaging results/findings within the past 24 hours.

## 2023-03-05 NOTE — PT/OT/SLP PROGRESS
Physical Therapy Treatment    Patient Name:  Martha Caraballo   MRN:  [id number]    Recommendations:     Discharge Recommendations: home with home health  Discharge Equipment Recommendations: none  Barriers to discharge: None    Assessment:     Martha Caraballo is a 89 y.o. female admitted with a medical diagnosis of Chest pain.  She presents with the following impairments/functional limitations: weakness, impaired functional mobility, impaired balance, decreased lower extremity function Patient very limited today due to increased pain in B feet, left more than right, unable to bear increased weight on L foot.    Rehab Prognosis: Good and Fair; patient would benefit from acute skilled PT services to address these deficits and reach maximum level of function.    Recent Surgery: * No surgery found *      Plan:     During this hospitalization, patient to be seen daily to address the identified rehab impairments via gait training, therapeutic activities, therapeutic exercises and progress toward the following goals:    Plan of Care Expires:  04/04/23    Subjective     Chief Complaint: pain  Patient/Family Comments/goals: to have no pain  Pain/Comfort:  Pain Rating 1: 7/10  Location - Side 1: Bilateral  Location 1: foot  Pain Addressed 1: Reposition, Nurse notified  Pain Rating Post-Intervention 1: 7/10      Objective:     Communicated with nursing prior to session.  Patient found supine with peripheral IV upon PT entry to room.     General Precautions: Standard, fall  Orthopedic Precautions: N/A  Braces: N/A  Respiratory Status: Room air     Functional Mobility:  Bed Mobility:     Supine to Sit: stand by assistance  Sit to Supine: stand by assistance  Transfers:     Sit to Stand:  minimum assistance with standard walker      AM-PAC 6 CLICK MOBILITY  Turning over in bed (including adjusting bedclothes, sheets and blankets)?: 3  Sitting down on and standing up from a chair with arms (e.g., wheelchair, bedside commode, etc.):  3  Moving from lying on back to sitting on the side of the bed?: 3  Moving to and from a bed to a chair (including a wheelchair)?: 3  Need to walk in hospital room?: 1  Climbing 3-5 steps with a railing?: 1  Basic Mobility Total Score: 14       Treatment & Education:  Multiple sit to stands from EOB and BLE seated therex.     Patient left supine with all lines intact, call button in reach, and bed alarm on..    GOALS:   Multidisciplinary Problems       Physical Therapy Goals          Problem: Physical Therapy    Goal Priority Disciplines Outcome Goal Variances Interventions   Physical Therapy Goal     PT, PT/OT Ongoing, Progressing     Description: Goals to be met by: discharge     Patient will increase functional independence with mobility by performin. Sit to stand transfer with Supervision and Stand-by Assistance  2. Gait  x 50 feet with Stand-by Assistance using Standard Walker.                          Time Tracking:     PT Received On: 23  PT Start Time: 1050     PT Stop Time: 1100  PT Total Time (min): 10 min     Billable Minutes: Therapeutic Activity 10    Treatment Type: Treatment  PT/PTA: PT           2023

## 2023-03-05 NOTE — ASSESSMENT & PLAN NOTE
Magnesium reviewed-   No results for input(s): MG in the last 48 hours.   Will replace magnesium and continue to monitor.  Gave another 2gm iv and will add po replacement

## 2023-03-05 NOTE — ASSESSMENT & PLAN NOTE
.  Latest blood pressure and vitals reviewed-   Temp:  [97.6 °F (36.4 °C)-99 °F (37.2 °C)]   Pulse:  [69-80]   Resp:  [16-20]   BP: ()/(40-92)   SpO2:  [90 %-100 %] .   Home meds for hypertension were reviewed and noted below.   Hypertension Medications             isosorbide mononitrate (IMDUR) 60 MG 24 hr tablet TAKE 1 TABLET BY MOUTH ONCE DAILY    labetaloL (NORMODYNE) 100 MG tablet TAKE 1 TABLET BY MOUTH TWICE A DAY    losartan (COZAAR) 50 MG tablet Take 50 mg by mouth once daily.    nitroGLYCERIN (NITROSTAT) 0.4 MG SL tablet Place 1 tablet (0.4 mg total) under the tongue every 5 (five) minutes as needed for Chest pain.          While in the hospital, will manage blood pressure as follows; Hold lebetalol  Will utilize p.r.n. blood pressure medication only if patient's blood pressure greater than  180/110 and she develops symptoms such as worsening chest pain or shortness of breath.

## 2023-03-06 PROBLEM — R20.8 BURNING SENSATION OF FOOT: Status: ACTIVE | Noted: 2023-03-06

## 2023-03-06 LAB
ANION GAP SERPL CALC-SCNC: 6 MEQ/L (ref 2–13)
BASOPHILS # BLD AUTO: 0.05 X10(3)/MCL (ref 0.01–0.08)
BASOPHILS NFR BLD AUTO: 0.6 % (ref 0.1–1.2)
BUN SERPL-MCNC: 19 MG/DL (ref 7–20)
CALCIUM SERPL-MCNC: 8.5 MG/DL (ref 8.4–10.2)
CHLORIDE SERPL-SCNC: 100 MMOL/L (ref 98–110)
CO2 SERPL-SCNC: 28 MMOL/L (ref 21–32)
CREAT SERPL-MCNC: 0.86 MG/DL (ref 0.66–1.25)
CREAT/UREA NIT SERPL: 22 (ref 12–20)
EOSINOPHIL # BLD AUTO: 0.09 X10(3)/MCL (ref 0.04–0.36)
EOSINOPHIL NFR BLD AUTO: 1.2 % (ref 0.7–7)
ERYTHROCYTE [DISTWIDTH] IN BLOOD BY AUTOMATED COUNT: 16 % (ref 11–14.5)
GFR SERPLBLD CREATININE-BSD FMLA CKD-EPI: 63 MLS/MIN/1.73/M2
GLUCOSE SERPL-MCNC: 107 MG/DL (ref 70–115)
HCT VFR BLD AUTO: 25.8 % (ref 36–48)
HGB BLD-MCNC: 8.9 G/DL (ref 11.8–16)
IMM GRANULOCYTES # BLD AUTO: 0.02 X10(3)/MCL (ref 0–0.03)
IMM GRANULOCYTES NFR BLD AUTO: 0.3 % (ref 0–0.5)
LYMPHOCYTES # BLD AUTO: 1.22 X10(3)/MCL (ref 1.16–3.74)
LYMPHOCYTES NFR BLD AUTO: 15.6 % (ref 20–55)
MCH RBC QN AUTO: 28.4 PG (ref 27–34)
MCV RBC AUTO: 82.4 FL (ref 79–99)
MEAN CELL HEMOGLOBIN CONCENTRATION (OHS) G/DL: 34.5 G/DL (ref 31–37)
MONOCYTES # BLD AUTO: 1.34 X10(3)/MCL (ref 0.24–0.36)
MONOCYTES NFR BLD AUTO: 17.1 % (ref 4.7–12.5)
NEUTROPHILS # BLD AUTO: 5.1 X10(3)/MCL (ref 1.56–6.13)
NEUTROPHILS NFR BLD AUTO: 65.2 % (ref 37–73)
NRBC BLD AUTO-RTO: 0 % (ref 0–1)
PLATELET # BLD AUTO: 242 X10(3)/MCL (ref 140–371)
PMV BLD AUTO: 12.2 FL (ref 9.4–12.4)
POTASSIUM SERPL-SCNC: 5.1 MMOL/L (ref 3.5–5.1)
RBC # BLD AUTO: 3.13 X10(6)/MCL (ref 4–5.1)
SODIUM SERPL-SCNC: 134 MMOL/L (ref 135–145)
WBC # SPEC AUTO: 7.8 X10(3)/MCL (ref 4–11.5)

## 2023-03-06 PROCEDURE — 25000003 PHARM REV CODE 250: Performed by: NURSE PRACTITIONER

## 2023-03-06 PROCEDURE — 80048 BASIC METABOLIC PNL TOTAL CA: CPT | Performed by: FAMILY MEDICINE

## 2023-03-06 PROCEDURE — 63600175 PHARM REV CODE 636 W HCPCS: Performed by: FAMILY MEDICINE

## 2023-03-06 PROCEDURE — 25000242 PHARM REV CODE 250 ALT 637 W/ HCPCS

## 2023-03-06 PROCEDURE — 94761 N-INVAS EAR/PLS OXIMETRY MLT: CPT

## 2023-03-06 PROCEDURE — 25000003 PHARM REV CODE 250: Performed by: FAMILY MEDICINE

## 2023-03-06 PROCEDURE — 21400001 HC TELEMETRY ROOM

## 2023-03-06 PROCEDURE — 97530 THERAPEUTIC ACTIVITIES: CPT

## 2023-03-06 PROCEDURE — 25000003 PHARM REV CODE 250

## 2023-03-06 PROCEDURE — 36415 COLL VENOUS BLD VENIPUNCTURE: CPT | Performed by: FAMILY MEDICINE

## 2023-03-06 PROCEDURE — 85025 COMPLETE CBC W/AUTO DIFF WBC: CPT | Performed by: FAMILY MEDICINE

## 2023-03-06 RX ORDER — GABAPENTIN 100 MG/1
100 CAPSULE ORAL 3 TIMES DAILY
Status: DISCONTINUED | OUTPATIENT
Start: 2023-03-06 | End: 2023-03-07

## 2023-03-06 RX ADMIN — RANOLAZINE 1000 MG: 500 TABLET, EXTENDED RELEASE ORAL at 08:03

## 2023-03-06 RX ADMIN — GABAPENTIN 300 MG: 300 CAPSULE ORAL at 08:03

## 2023-03-06 RX ADMIN — Medication 1 TABLET: at 08:03

## 2023-03-06 RX ADMIN — ATORVASTATIN CALCIUM 10 MG: 10 TABLET, FILM COATED ORAL at 08:03

## 2023-03-06 RX ADMIN — PANTOPRAZOLE SODIUM 40 MG: 40 TABLET, DELAYED RELEASE ORAL at 08:03

## 2023-03-06 RX ADMIN — MELATONIN TAB 3 MG 6 MG: 3 TAB at 08:03

## 2023-03-06 RX ADMIN — FLUTICASONE PROPIONATE 50 MCG: 50 SPRAY, METERED NASAL at 08:03

## 2023-03-06 RX ADMIN — LINACLOTIDE 72 MCG: 72 CAPSULE, GELATIN COATED ORAL at 08:03

## 2023-03-06 RX ADMIN — DICYCLOMINE HYDROCHLORIDE 20 MG: 20 TABLET ORAL at 11:03

## 2023-03-06 RX ADMIN — ENOXAPARIN SODIUM 60 MG: 60 INJECTION SUBCUTANEOUS at 08:03

## 2023-03-06 RX ADMIN — GABAPENTIN 300 MG: 300 CAPSULE ORAL at 02:03

## 2023-03-06 RX ADMIN — Medication 2 DROP: at 02:03

## 2023-03-06 RX ADMIN — ISOSORBIDE MONONITRATE 60 MG: 30 TABLET, EXTENDED RELEASE ORAL at 08:03

## 2023-03-06 RX ADMIN — Medication 2 DROP: at 05:03

## 2023-03-06 RX ADMIN — MAGNESIUM OXIDE 400 MG: 400 TABLET ORAL at 08:03

## 2023-03-06 RX ADMIN — DICYCLOMINE HYDROCHLORIDE 20 MG: 20 TABLET ORAL at 05:03

## 2023-03-06 RX ADMIN — ASPIRIN 81 MG: 81 TABLET, COATED ORAL at 08:03

## 2023-03-06 RX ADMIN — Medication 2 DROP: at 09:03

## 2023-03-06 RX ADMIN — Medication 2 DROP: at 11:03

## 2023-03-06 NOTE — PLAN OF CARE
Physician ordered to consult [facility] to eval patient. Patient/fly aware of consult and agreed to eval. Referral made to [facility] per phone/fax, spoke with Clare.

## 2023-03-06 NOTE — PT/OT/SLP PROGRESS
Physical Therapy Treatment    Patient Name:  Martha Caraballo   MRN:  [id number]    Recommendations:     Discharge Recommendations: home with home health  Discharge Equipment Recommendations: none  Barriers to discharge: None    Assessment:     Martha Caraballo is a 89 y.o. female admitted with a medical diagnosis of Chest pain.  She presents with the following impairments/functional limitations: weakness, impaired functional mobility, impaired balance, decreased lower extremity function Patient with increased pain in B feet today with increased sensitivity with inability to stand and bear weight on feet. .    Rehab Prognosis: Good and Fair; patient would benefit from acute skilled PT services to address these deficits and reach maximum level of function.    Recent Surgery: * No surgery found *      Plan:     During this hospitalization, patient to be seen daily to address the identified rehab impairments via gait training, therapeutic activities, therapeutic exercises and progress toward the following goals:    Plan of Care Expires:  04/04/23    Subjective     Chief Complaint: pain  Patient/Family Comments/goals: to stand without pain  Pain/Comfort:  Pain Rating 1: 7/10  Location - Side 1: Bilateral  Location 1: foot  Pain Addressed 1: Cessation of Activity, Nurse notified  Pain Rating Post-Intervention 1: 7/10      Objective:     Communicated with nursing prior to session.  Patient found supine with peripheral IV upon PT entry to room.     General Precautions: Standard, fall  Orthopedic Precautions: N/A  Braces: N/A  Respiratory Status: Room air     Functional Mobility:  Bed Mobility:     Supine to Sit: contact guard assistance  Sit to Supine: contact guard assistance  Transfers:     Sit to Stand:  unable to achieve with standard walker      AM-PAC 6 CLICK MOBILITY  Turning over in bed (including adjusting bedclothes, sheets and blankets)?: 3  Sitting down on and standing up from a chair with arms (e.g., wheelchair,  bedside commode, etc.): 3  Moving from lying on back to sitting on the side of the bed?: 3  Moving to and from a bed to a chair (including a wheelchair)?: 3  Need to walk in hospital room?: 3  Climbing 3-5 steps with a railing?: 3  Basic Mobility Total Score: 18       Treatment & Education:  See above plus BLE seated therex    Patient left supine with all lines intact and call button in reach..    GOALS:   Multidisciplinary Problems       Physical Therapy Goals          Problem: Physical Therapy    Goal Priority Disciplines Outcome Goal Variances Interventions   Physical Therapy Goal     PT, PT/OT Ongoing, Progressing     Description: Goals to be met by: discharge     Patient will increase functional independence with mobility by performin. Sit to stand transfer with Supervision and Stand-by Assistance  2. Gait  x 50 feet with Stand-by Assistance using Standard Walker.                          Time Tracking:     PT Received On: 23  PT Start Time: 1520     PT Stop Time: 1530  PT Total Time (min): 10 min     Billable Minutes: Therapeutic Activity 10    Treatment Type: Treatment  PT/PTA: PT           2023

## 2023-03-06 NOTE — SUBJECTIVE & OBJECTIVE
Interval History:      Review of Systems   Constitutional:  Negative for activity change, appetite change and fever.   Respiratory:  Negative for chest tightness, shortness of breath and wheezing.    Cardiovascular:  Negative for chest pain.   Gastrointestinal:  Negative for abdominal pain, constipation, diarrhea, nausea and vomiting.   Genitourinary:  Negative for dysuria.   Musculoskeletal:  Positive for arthralgias (left shoulder).   Skin:  Negative for rash and wound.   Neurological:  Negative for dizziness, tremors, speech difficulty, light-headedness and headaches.        Had episode associated with lower blood pressure 0300 am   Psychiatric/Behavioral:  Negative for confusion.    Objective:     Vital Signs (Most Recent):  Temp: 97.9 °F (36.6 °C) (03/06/23 1430)  Pulse: 78 (03/06/23 1430)  Resp: 20 (03/06/23 1430)  BP: 123/60 (03/06/23 1430)  SpO2: 98 % (03/06/23 1430) Vital Signs (24h Range):  Temp:  [97.6 °F (36.4 °C)-98.6 °F (37 °C)] 97.9 °F (36.6 °C)  Pulse:  [78-90] 78  Resp:  [16-20] 20  SpO2:  [96 %-100 %] 98 %  BP: (113-165)/(57-69) 123/60     Weight: 60.8 kg (134 lb 1.6 oz)  Body mass index is 21.64 kg/m².    Intake/Output Summary (Last 24 hours) at 3/6/2023 1653  Last data filed at 3/6/2023 1438  Gross per 24 hour   Intake 360 ml   Output 1400 ml   Net -1040 ml        Physical Exam  Constitutional:       General: She is not in acute distress.     Appearance: Normal appearance. She is normal weight. She is not ill-appearing.   HENT:      Head: Normocephalic and atraumatic.   Eyes:      General: No scleral icterus.     Extraocular Movements: Extraocular movements intact.   Cardiovascular:      Rate and Rhythm: Normal rate and regular rhythm.      Pulses: Normal pulses.      Heart sounds: Normal heart sounds.   Pulmonary:      Effort: Pulmonary effort is normal.      Breath sounds: Normal breath sounds.   Abdominal:      General: Abdomen is flat. Bowel sounds are normal.      Palpations: Abdomen is  soft.   Skin:     General: Skin is warm and dry.      Capillary Refill: Capillary refill takes less than 2 seconds.      Findings: No erythema, lesion or rash.   Neurological:      General: No focal deficit present.      Mental Status: She is alert and oriented to person, place, and time.   Psychiatric:         Mood and Affect: Mood normal.         Behavior: Behavior normal.         Thought Content: Thought content normal.       Significant Labs: All pertinent labs within the past 24 hours have been reviewed.  CBC:   Recent Labs   Lab 03/05/23  0510 03/06/23  0426   WBC 6.3 7.8   HGB 8.7* 8.9*   HCT 25.3* 25.8*    242       CMP:   Recent Labs   Lab 03/05/23  0510 03/06/23  0426    134*   K 5.0 5.1   CO2 27 28   BUN 22.0* 19.0   CREATININE 0.90 0.86   CALCIUM 8.3* 8.5   ALBUMIN 3.3*  --    BILITOT 0.4  --    ALKPHOS 40*  --    AST 27  --    ALT <4  --          Significant Imaging: I have reviewed all pertinent imaging results/findings within the past 24 hours.

## 2023-03-06 NOTE — PROGRESS NOTES
[facility]/Surg  [facility] Medicine  Progress Note    Patient Name: Martha Caraballo  MRN: [id number]  Patient Class: IP- Inpatient   Admission Date: 2023  Length of Stay: 6 days  Attending Physician: Maria Del Rosario Reagan MD  Primary Care Provider: Jordan Sanchez MD        Subjective:     Principal Problem:Chest pain        HPI:  Patient is a 89 y.o. female pt of Dr. Sanchez in ,  with a past medical history of moderate aortic stenosis, hypertension, hyperlipidemia, TIA, gastroesophageal reflux disease, osteoarthritis and coronary artery disease, unclear if she had any intervention in the past presents to the ER on account of left-sided chest pain over the past few days.    Patient has been at her usual state of health until about 6 days ago when she developed left-sided chest pain, pressure-like, about 5/10 in intensity, radiating to the left upper extremities with no aggravating or relieving factor.  She admits occasional shortness of breath.  No dizziness or loss of consciousness.  She decided to come to the ER for further evaluation.    At the ER, cardiac enzymes not remarkable.  Chest x-ray shows no acute findings      Overview/Hospital Course:   2023 spoke with Dr. Silva, wants therapeutic dose lovenox x 48 hrs, add amlodipine and increased ranexa.  Current cardiac enzymes are negative.  Pt does admit to some 2-3/10 left sided chest pain.  2023 troponin have been negative, no chest pain reproducible component left shoulder pain  2023 troponin negative, no further chest pain, pt had episode of dizziness and confusion associated with transient low BP when she got up at 0300 am overnight to ambulate to the bathroom, called on call telemed and gave 500cc bolus of ivf and her symtpoms have imrpoved. No symptoms at present and she is back to her baseline mental status.  2023 pt still experiencing some fluctuations in BP.  Orthostatic vitals   Llyin/58  Sitting:  118/57  Standin/49  spoke with DR. Silva recommends continue imdur and labetolol d/c losartan and amlodipine  2023 patient still with orthostatic hypotension despite discontinuing the losartan and amlodipine.  He is very weak having a hard time even going to the bathroom and doing activities of daily living.  I will put labetalol on hold consult Physical therapy consult for [facility] rehab placement  2023 since discontinuing the labetalol her orthostatic hypotension has resolved.  She is tolerating physical therapy but remains very weak.  Plans are for her to go to rehab tomorrow.  2023 pt awake and alert, c/o pain on feet on the bottom very tender and hypersensitive, difficulty walking due to severe burning pain, s tarted yesterday.      Interval History:      Review of Systems   Constitutional:  Negative for activity change, appetite change and fever.   Respiratory:  Negative for chest tightness, shortness of breath and wheezing.    Cardiovascular:  Negative for chest pain.   Gastrointestinal:  Negative for abdominal pain, constipation, diarrhea, nausea and vomiting.   Genitourinary:  Negative for dysuria.   Musculoskeletal:  Positive for arthralgias (left shoulder).   Skin:  Negative for rash and wound.   Neurological:  Negative for dizziness, tremors, speech difficulty, light-headedness and headaches.        Had episode associated with lower blood pressure 0300 am   Psychiatric/Behavioral:  Negative for confusion.    Objective:     Vital Signs (Most Recent):  Temp: 97.9 °F (36.6 °C) (23 1430)  Pulse: 78 (23 1430)  Resp: 20 (23 1430)  BP: 123/60 (23 1430)  SpO2: 98 % (23 1430) Vital Signs (24h Range):  Temp:  [97.6 °F (36.4 °C)-98.6 °F (37 °C)] 97.9 °F (36.6 °C)  Pulse:  [78-90] 78  Resp:  [16-20] 20  SpO2:  [96 %-100 %] 98 %  BP: (113-165)/(57-69) 123/60     Weight: 60.8 kg (134 lb 1.6 oz)  Body mass index is 21.64 kg/m².    Intake/Output Summary (Last 24  hours) at 3/6/2023 1653  Last data filed at 3/6/2023 1438  Gross per 24 hour   Intake 360 ml   Output 1400 ml   Net -1040 ml        Physical Exam  Constitutional:       General: She is not in acute distress.     Appearance: Normal appearance. She is normal weight. She is not ill-appearing.   HENT:      Head: Normocephalic and atraumatic.   Eyes:      General: No scleral icterus.     Extraocular Movements: Extraocular movements intact.   Cardiovascular:      Rate and Rhythm: Normal rate and regular rhythm.      Pulses: Normal pulses.      Heart sounds: Normal heart sounds.   Pulmonary:      Effort: Pulmonary effort is normal.      Breath sounds: Normal breath sounds.   Abdominal:      General: Abdomen is flat. Bowel sounds are normal.      Palpations: Abdomen is soft.   Skin:     General: Skin is warm and dry.      Capillary Refill: Capillary refill takes less than 2 seconds.      Findings: No erythema, lesion or rash.   Neurological:      General: No focal deficit present.      Mental Status: She is alert and oriented to person, place, and time.   Psychiatric:         Mood and Affect: Mood normal.         Behavior: Behavior normal.         Thought Content: Thought content normal.       Significant Labs: All pertinent labs within the past 24 hours have been reviewed.  CBC:   Recent Labs   Lab 03/05/23  0510 03/06/23  0426   WBC 6.3 7.8   HGB 8.7* 8.9*   HCT 25.3* 25.8*    242       CMP:   Recent Labs   Lab 03/05/23  0510 03/06/23  0426    134*   K 5.0 5.1   CO2 27 28   BUN 22.0* 19.0   CREATININE 0.90 0.86   CALCIUM 8.3* 8.5   ALBUMIN 3.3*  --    BILITOT 0.4  --    ALKPHOS 40*  --    AST 27  --    ALT <4  --          Significant Imaging: I have reviewed all pertinent imaging results/findings within the past 24 hours.      Assessment/Plan:      * Chest pain  Trend cardiac enzymes  troponins have been negative  No chest pain      Orthostatic hypotension  Definitely orthostatic on BP, HR 70-80 no  significant change  Dr. Silva recommends continue imdur and labetolol hold losartan and amlodipine  Hold labetalol.  Recheck orthostatics in a.m..    Coronary artery disease  CIS consulted and trend enzymes  Increased ranexa  Continue statin  lovenox high dose  Continue amlodipine      Hypomagnesemia  Magnesium reviewed-   No results for input(s): MG in the last 48 hours.   Will replace magnesium and continue to monitor.  Gave another 2gm iv and will add po replacement           Aortic valve stenosis  Trend cardiac enxymes  CIS following      Iron deficiency anemia     Iron sat is 19%  V enofer daily    HTN (hypertension)  .  Latest blood pressure and vitals reviewed-   Temp:  [97.6 °F (36.4 °C)-99 °F (37.2 °C)]   Pulse:  [69-80]   Resp:  [16-20]   BP: ()/(40-92)   SpO2:  [90 %-100 %] .   Home meds for hypertension were reviewed and noted below.   Hypertension Medications             isosorbide mononitrate (IMDUR) 60 MG 24 hr tablet TAKE 1 TABLET BY MOUTH ONCE DAILY    labetaloL (NORMODYNE) 100 MG tablet TAKE 1 TABLET BY MOUTH TWICE A DAY    losartan (COZAAR) 50 MG tablet Take 50 mg by mouth once daily.    nitroGLYCERIN (NITROSTAT) 0.4 MG SL tablet Place 1 tablet (0.4 mg total) under the tongue every 5 (five) minutes as needed for Chest pain.          While in the hospital, will manage blood pressure as follows; Hold lebetalol  Will utilize p.r.n. blood pressure medication only if patient's blood pressure greater than  180/110 and she develops symptoms such as worsening chest pain or shortness of breath.        HLD (hyperlipidemia)   Patient is chronically on statin.will continue for now. Monitor clinically. Last LDL was   Lab Results   Component Value Date    LDLCALC 59 12/21/2022            Burning sensation of foot  Rule out neuropathy  Add gabepentin 100mg tid        VTE Risk Mitigation (From admission, onward)         Ordered     enoxaparin injection 60 mg  Every 12 hours (non-standard times)          03/01/23 0852     IP VTE HIGH RISK PATIENT  Once         02/28/23 0152     Place sequential compression device  Until discontinued         02/28/23 0152                Discharge Planning   JAN: 3/7/2023     Code Status: Prior   Is the patient medically ready for discharge?:     Reason for patient still in hospital (select all that apply): Patient trending condition and Treatment  Discharge Plan A: Home Health                  Maria Del Rosario Reagan MD  Department of Hospital Medicine   [facility]/Surg

## 2023-03-06 NOTE — PLAN OF CARE
Problem: Adult Inpatient Plan of Care  Goal: Plan of Care Review  Outcome: Ongoing, Progressing  Goal: Patient-Specific Goal (Individualized)  Outcome: Ongoing, Progressing  Goal: Absence of Hospital-Acquired Illness or Injury  Outcome: Ongoing, Progressing  Goal: Optimal Comfort and Wellbeing  Outcome: Ongoing, Progressing  Goal: Readiness for Transition of Care  Outcome: Ongoing, Progressing     Problem: Fall Injury Risk  Goal: Absence of Fall and Fall-Related Injury  Outcome: Ongoing, Progressing     Problem: Bariatric Environmental Safety  Goal: Safety Maintained with Care  Outcome: Ongoing, Progressing   Reviewed with patient

## 2023-03-07 LAB
ANION GAP SERPL CALC-SCNC: 6 MEQ/L (ref 2–13)
BASOPHILS # BLD AUTO: 0.03 X10(3)/MCL (ref 0.01–0.08)
BASOPHILS NFR BLD AUTO: 0.4 % (ref 0.1–1.2)
BUN SERPL-MCNC: 19 MG/DL (ref 7–20)
CALCIUM SERPL-MCNC: 8.7 MG/DL (ref 8.4–10.2)
CHLORIDE SERPL-SCNC: 100 MMOL/L (ref 98–110)
CO2 SERPL-SCNC: 25 MMOL/L (ref 21–32)
CREAT SERPL-MCNC: 0.86 MG/DL (ref 0.66–1.25)
CREAT/UREA NIT SERPL: 22 (ref 12–20)
EOSINOPHIL # BLD AUTO: 0.06 X10(3)/MCL (ref 0.04–0.36)
EOSINOPHIL NFR BLD AUTO: 0.9 % (ref 0.7–7)
ERYTHROCYTE [DISTWIDTH] IN BLOOD BY AUTOMATED COUNT: 15.7 % (ref 11–14.5)
GFR SERPLBLD CREATININE-BSD FMLA CKD-EPI: 63 MLS/MIN/1.73/M2
GLUCOSE SERPL-MCNC: 110 MG/DL (ref 70–115)
HCT VFR BLD AUTO: 25.5 % (ref 36–48)
HGB BLD-MCNC: 8.9 G/DL (ref 11.8–16)
IMM GRANULOCYTES # BLD AUTO: 0.02 X10(3)/MCL (ref 0–0.03)
IMM GRANULOCYTES NFR BLD AUTO: 0.3 % (ref 0–0.5)
LYMPHOCYTES # BLD AUTO: 1.12 X10(3)/MCL (ref 1.16–3.74)
LYMPHOCYTES NFR BLD AUTO: 16.6 % (ref 20–55)
MCH RBC QN AUTO: 28.4 PG (ref 27–34)
MCV RBC AUTO: 81.5 FL (ref 79–99)
MEAN CELL HEMOGLOBIN CONCENTRATION (OHS) G/DL: 34.9 G/DL (ref 31–37)
MONOCYTES # BLD AUTO: 1.11 X10(3)/MCL (ref 0.24–0.36)
MONOCYTES NFR BLD AUTO: 16.4 % (ref 4.7–12.5)
NEUTROPHILS # BLD AUTO: 4.41 X10(3)/MCL (ref 1.56–6.13)
NEUTROPHILS NFR BLD AUTO: 65.4 % (ref 37–73)
NRBC BLD AUTO-RTO: 0 % (ref 0–1)
PLATELET # BLD AUTO: 267 X10(3)/MCL (ref 140–371)
PMV BLD AUTO: 12 FL (ref 9.4–12.4)
POTASSIUM SERPL-SCNC: 4.5 MMOL/L (ref 3.5–5.1)
RBC # BLD AUTO: 3.13 X10(6)/MCL (ref 4–5.1)
SODIUM SERPL-SCNC: 131 MMOL/L (ref 135–145)
URATE SERPL-MCNC: 3.7 MG/DL (ref 2.6–7.2)
WBC # SPEC AUTO: 6.8 X10(3)/MCL (ref 4–11.5)

## 2023-03-07 PROCEDURE — 25000003 PHARM REV CODE 250: Performed by: FAMILY MEDICINE

## 2023-03-07 PROCEDURE — 25000242 PHARM REV CODE 250 ALT 637 W/ HCPCS

## 2023-03-07 PROCEDURE — 80048 BASIC METABOLIC PNL TOTAL CA: CPT | Performed by: FAMILY MEDICINE

## 2023-03-07 PROCEDURE — 36415 COLL VENOUS BLD VENIPUNCTURE: CPT | Performed by: FAMILY MEDICINE

## 2023-03-07 PROCEDURE — 97530 THERAPEUTIC ACTIVITIES: CPT

## 2023-03-07 PROCEDURE — 25000003 PHARM REV CODE 250: Performed by: NURSE PRACTITIONER

## 2023-03-07 PROCEDURE — 25000003 PHARM REV CODE 250

## 2023-03-07 PROCEDURE — 85025 COMPLETE CBC W/AUTO DIFF WBC: CPT | Performed by: FAMILY MEDICINE

## 2023-03-07 PROCEDURE — 94761 N-INVAS EAR/PLS OXIMETRY MLT: CPT

## 2023-03-07 PROCEDURE — 84550 ASSAY OF BLOOD/URIC ACID: CPT | Performed by: FAMILY MEDICINE

## 2023-03-07 PROCEDURE — 21400001 HC TELEMETRY ROOM

## 2023-03-07 PROCEDURE — 63600175 PHARM REV CODE 636 W HCPCS: Performed by: FAMILY MEDICINE

## 2023-03-07 RX ORDER — GABAPENTIN 400 MG/1
400 CAPSULE ORAL 3 TIMES DAILY
Status: DISCONTINUED | OUTPATIENT
Start: 2023-03-07 | End: 2023-03-08 | Stop reason: HOSPADM

## 2023-03-07 RX ADMIN — ASPIRIN 81 MG: 81 TABLET, COATED ORAL at 08:03

## 2023-03-07 RX ADMIN — ENOXAPARIN SODIUM 60 MG: 60 INJECTION SUBCUTANEOUS at 09:03

## 2023-03-07 RX ADMIN — Medication 2 DROP: at 06:03

## 2023-03-07 RX ADMIN — PANTOPRAZOLE SODIUM 40 MG: 40 TABLET, DELAYED RELEASE ORAL at 08:03

## 2023-03-07 RX ADMIN — DICYCLOMINE HYDROCHLORIDE 20 MG: 20 TABLET ORAL at 05:03

## 2023-03-07 RX ADMIN — Medication 2 DROP: at 05:03

## 2023-03-07 RX ADMIN — Medication 1 TABLET: at 08:03

## 2023-03-07 RX ADMIN — Medication 2 DROP: at 01:03

## 2023-03-07 RX ADMIN — GABAPENTIN 300 MG: 300 CAPSULE ORAL at 09:03

## 2023-03-07 RX ADMIN — ATORVASTATIN CALCIUM 10 MG: 10 TABLET, FILM COATED ORAL at 08:03

## 2023-03-07 RX ADMIN — RANOLAZINE 1000 MG: 500 TABLET, EXTENDED RELEASE ORAL at 09:03

## 2023-03-07 RX ADMIN — HYDROCODONE BITARTRATE AND ACETAMINOPHEN 1 TABLET: 5; 325 TABLET ORAL at 08:03

## 2023-03-07 RX ADMIN — ISOSORBIDE MONONITRATE 60 MG: 30 TABLET, EXTENDED RELEASE ORAL at 08:03

## 2023-03-07 RX ADMIN — RANOLAZINE 1000 MG: 500 TABLET, EXTENDED RELEASE ORAL at 08:03

## 2023-03-07 RX ADMIN — Medication 2 DROP: at 09:03

## 2023-03-07 RX ADMIN — GABAPENTIN 400 MG: 400 CAPSULE ORAL at 09:03

## 2023-03-07 RX ADMIN — LINACLOTIDE 72 MCG: 72 CAPSULE, GELATIN COATED ORAL at 09:03

## 2023-03-07 RX ADMIN — GABAPENTIN 100 MG: 100 CAPSULE ORAL at 09:03

## 2023-03-07 RX ADMIN — MAGNESIUM OXIDE 400 MG: 400 TABLET ORAL at 08:03

## 2023-03-07 RX ADMIN — GABAPENTIN 400 MG: 400 CAPSULE ORAL at 02:03

## 2023-03-07 RX ADMIN — MAGNESIUM OXIDE 400 MG: 400 TABLET ORAL at 09:03

## 2023-03-07 RX ADMIN — DICYCLOMINE HYDROCHLORIDE 20 MG: 20 TABLET ORAL at 01:03

## 2023-03-07 RX ADMIN — FLUTICASONE PROPIONATE 50 MCG: 50 SPRAY, METERED NASAL at 09:03

## 2023-03-07 NOTE — PROGRESS NOTES
[facility]/Surg  [facility] Medicine  Progress Note    Patient Name: Martha Caraballo  MRN: [id number]  Patient Class: IP- Inpatient   Admission Date: 2023  Length of Stay: 7 days  Attending Physician: Maria Del Rosario Reagan MD  Primary Care Provider: Jordan Sanchez MD        Subjective:     Principal Problem:Chest pain        HPI:  Patient is a 89 y.o. female pt of Dr. Sanchez in ,  with a past medical history of moderate aortic stenosis, hypertension, hyperlipidemia, TIA, gastroesophageal reflux disease, osteoarthritis and coronary artery disease, unclear if she had any intervention in the past presents to the ER on account of left-sided chest pain over the past few days.    Patient has been at her usual state of health until about 6 days ago when she developed left-sided chest pain, pressure-like, about 5/10 in intensity, radiating to the left upper extremities with no aggravating or relieving factor.  She admits occasional shortness of breath.  No dizziness or loss of consciousness.  She decided to come to the ER for further evaluation.    At the ER, cardiac enzymes not remarkable.  Chest x-ray shows no acute findings      Overview/Hospital Course:   2023 spoke with Dr. Silva, wants therapeutic dose lovenox x 48 hrs, add amlodipine and increased ranexa.  Current cardiac enzymes are negative.  Pt does admit to some 2-3/10 left sided chest pain.  2023 troponin have been negative, no chest pain reproducible component left shoulder pain  2023 troponin negative, no further chest pain, pt had episode of dizziness and confusion associated with transient low BP when she got up at 0300 am overnight to ambulate to the bathroom, called on call telemed and gave 500cc bolus of ivf and her symtpoms have imrpoved. No symptoms at present and she is back to her baseline mental status.  2023 pt still experiencing some fluctuations in BP.  Orthostatic vitals   Llyin/58  Sitting:  118/57  Standin/49  spoke with DR. Silva recommends continue imdur and labetolol d/c losartan and amlodipine  2023 patient still with orthostatic hypotension despite discontinuing the losartan and amlodipine.  He is very weak having a hard time even going to the bathroom and doing activities of daily living.  I will put labetalol on hold consult Physical therapy consult for [facility] rehab placement  2023 since discontinuing the labetalol her orthostatic hypotension has resolved.  She is tolerating physical therapy but remains very weak.  Plans are for her to go to rehab tomorrow.  2023 pt awake and alert, c/o pain on feet on the bottom very tender and hypersensitive, difficulty walking due to severe burning pain, s tarted yesterday.  2023 feel better today, BP improved this AM       Interval History:      Review of Systems   Constitutional:  Negative for activity change, appetite change and fever.   Respiratory:  Negative for chest tightness, shortness of breath and wheezing.    Cardiovascular:  Negative for chest pain.   Gastrointestinal:  Negative for abdominal pain, constipation, diarrhea, nausea and vomiting.   Genitourinary:  Negative for dysuria.   Musculoskeletal:  Positive for arthralgias (left shoulder).   Skin:  Negative for rash and wound.   Neurological:  Negative for dizziness, tremors, speech difficulty, light-headedness and headaches.        Had episode associated with lower blood pressure 0300 am   Psychiatric/Behavioral:  Negative for confusion.    Objective:     Vital Signs (Most Recent):  Temp: 98.8 °F (37.1 °C) (23 1049)  Pulse: 81 (23 1049)  Resp: 17 (23 1049)  BP: 121/64 (23 1049)  SpO2: 99 % (23 1049) Vital Signs (24h Range):  Temp:  [97.8 °F (36.6 °C)-98.8 °F (37.1 °C)] 98.8 °F (37.1 °C)  Pulse:  [76-84] 81  Resp:  [17-20] 17  SpO2:  [96 %-99 %] 99 %  BP: (120-153)/(52-65) 121/64     Weight: 60.9 kg (134 lb 3.2 oz)  Body mass index  is 21.66 kg/m².    Intake/Output Summary (Last 24 hours) at 3/7/2023 1213  Last data filed at 3/7/2023 0907  Gross per 24 hour   Intake 360 ml   Output 970 ml   Net -610 ml        Physical Exam  Constitutional:       General: She is not in acute distress.     Appearance: Normal appearance. She is normal weight. She is not ill-appearing.   HENT:      Head: Normocephalic and atraumatic.   Eyes:      General: No scleral icterus.     Extraocular Movements: Extraocular movements intact.   Cardiovascular:      Rate and Rhythm: Normal rate and regular rhythm.      Pulses: Normal pulses.      Heart sounds: Normal heart sounds.   Pulmonary:      Effort: Pulmonary effort is normal.      Breath sounds: Normal breath sounds.   Abdominal:      General: Abdomen is flat. Bowel sounds are normal.      Palpations: Abdomen is soft.   Skin:     General: Skin is warm and dry.      Capillary Refill: Capillary refill takes less than 2 seconds.      Findings: No erythema, lesion or rash.   Neurological:      General: No focal deficit present.      Mental Status: She is alert and oriented to person, place, and time.   Psychiatric:         Mood and Affect: Mood normal.         Behavior: Behavior normal.         Thought Content: Thought content normal.       Significant Labs: All pertinent labs within the past 24 hours have been reviewed.  CBC:   Recent Labs   Lab 03/06/23  0426   WBC 7.8   HGB 8.9*   HCT 25.8*          CMP:   Recent Labs   Lab 03/06/23  0426   *   K 5.1   CO2 28   BUN 19.0   CREATININE 0.86   CALCIUM 8.5         Significant Imaging: I have reviewed all pertinent imaging results/findings within the past 24 hours.      Assessment/Plan:      * Chest pain  Trend cardiac enzymes  troponins have been negative  No chest pain      Burning sensation of foot  Rule out neuropathy  Add gabepentin 100mg tid      Orthostatic hypotension  Definitely orthostatic on BP, HR 70-80 no significant change  Dr. Silva recommends  continue imdur and labetolol hold losartan and amlodipine  Hold labetalol.  Recheck orthostatics in a.m..    Hypomagnesemia  Magnesium reviewed-   No results for input(s): MG in the last 48 hours.   Will replace magnesium and continue to monitor.  Gave another 2gm iv and will add po replacement           HLD (hyperlipidemia)   Patient is chronically on statin.will continue for now. Monitor clinically. Last LDL was   Lab Results   Component Value Date    LDLCALC 59 12/21/2022            Aortic valve stenosis  Trend cardiac enxymes  CIS following      HTN (hypertension)  .  Latest blood pressure and vitals reviewed-   Temp:  [97.6 °F (36.4 °C)-99 °F (37.2 °C)]   Pulse:  [69-80]   Resp:  [16-20]   BP: ()/(40-92)   SpO2:  [90 %-100 %] .   Home meds for hypertension were reviewed and noted below.   Hypertension Medications             isosorbide mononitrate (IMDUR) 60 MG 24 hr tablet TAKE 1 TABLET BY MOUTH ONCE DAILY    labetaloL (NORMODYNE) 100 MG tablet TAKE 1 TABLET BY MOUTH TWICE A DAY    losartan (COZAAR) 50 MG tablet Take 50 mg by mouth once daily.    nitroGLYCERIN (NITROSTAT) 0.4 MG SL tablet Place 1 tablet (0.4 mg total) under the tongue every 5 (five) minutes as needed for Chest pain.          While in the hospital, will manage blood pressure as follows; Hold lebetalol  Will utilize p.r.n. blood pressure medication only if patient's blood pressure greater than  180/110 and she develops symptoms such as worsening chest pain or shortness of breath.        Coronary artery disease  CIS consulted and trend enzymes  Increased ranexa  Continue statin  lovenox high dose  Continue amlodipine      Iron deficiency anemia     Iron sat is 19%  V enofer daily      VTE Risk Mitigation (From admission, onward)         Ordered     enoxaparin injection 60 mg  Every 12 hours (non-standard times)         03/01/23 0852     IP VTE HIGH RISK PATIENT  Once         02/28/23 0152     Place sequential compression device  Until  discontinued         02/28/23 0152                Discharge Planning   JAN: 3/10/2023     Code Status: Prior   Is the patient medically ready for discharge?:     Reason for patient still in hospital (select all that apply): Patient trending condition, Laboratory test and Treatment  Discharge Plan A: Home Health                  Juan Khan MD  Department of Hospital Medicine   [facility]/Surg

## 2023-03-07 NOTE — NURSING
VISITING W/FMLY MEMBERS X2. MOOD PLEASANT. REPORTS EXTREME TENDERNESS TO TOUCH TO BILAT. FEET, LEFT WORSE THAN RIGHT. DENIES NEED FOR COMFORT MEASURES. NO DISTRESS NOTED.

## 2023-03-07 NOTE — PLAN OF CARE
03/07/23 1443   Discharge Reassessment   Assessment Type Discharge Planning Reassessment   Did the patient's condition or plan change since previous assessment? Yes   Discharge Plan discussed with: Patient   Discharge Plan A Rehab   Discharge Plan B Home Health   Discharge Barriers Identified None   Why the patient remains in the hospital Requires continued medical care   Post-Acute Status   Post-Acute Authorization Placement   Post-Acute Placement Status Pending medical clearance/testing   Coverage [facility]   Discharge Delays (!) Change in Medical Condition

## 2023-03-07 NOTE — PHYSICIAN QUERY
PT Name: Martha Caraballo  MR #: [id number]     DOCUMENTATION CLARIFICATION      CDS/: Marina Templeton RN, CCDS              Contact information: [email or web address]  This form is a permanent document in the medical record.     Query Date: March 7, 2023    By submitting this query, we are merely seeking further clarification of documentation.  Please utilize your independent clinical judgment when addressing the question(s) below.     The Medical Record contains the following:    Clinical Information Location in Medical Record    complains of chest pain for the past couple of weeks  significant coronary artery disease, including an obstructed stent, as well as AS    . Atypical chest pain; acute coronary syndrome rule out    Trop <0.012,       Her EKG upon arrival showed a normal sinus rhythm with no acute changes.  Chest pain with shortness of breath  Negative EKG and cardiac biomarkers    3/1- troponin have been negative, no chest pain reproducible component left shoulder pain    Principal Problem:  Chest Pain 2/27 ed note      2/28 h/p    2/27, 2/28, 3/1  lab    2/28 cards note        3/1 prog note      2/28- 3/7 prog notes                     Please document your best medical opinion regarding the etiology of  Chest Pain?       [   ] Etiology:_______________   [ X ] Clinically Undetermined             Please document in your progress notes daily for the duration of treatment, until resolved, and include in your discharge summary.

## 2023-03-07 NOTE — PROGRESS NOTES
"Inpatient Nutrition Evaluation    Admit Date: 2023   Total duration of encounter: 8 days    Nutrition Recommendation/Prescription     Add Lactose Intolerance to allergies.    Add NCS modifier to Cardiac Diet. Add yogurt bid.     Add Pro-Stat Bid for extra calories and protein (200 kcal, 30 gm pro).     RD to monitor patients PO Intake, Weight, Labs and adjust MNT as needed.       Nutrition Assessment     Chart Review    Reason Seen: length of stay    Malnutrition Screening Tool Results   Have you recently lost weight without trying?: No  Have you been eating poorly because of a decreased appetite?: No   MST Score: 0     Diagnosis:  Chest Pain, Orthostatic Hypotension, CAD, Hypomagnesemia, Aortic Valve Stenosis, Iron Deficiency Anemia, HTN, HLD, Burning Sensation to Feet.         Diet Order: Diet Cardiac No Concentrated Sweets  Oral Supplement Order: none  Appetite/Oral Intake: fair/50-75% of meals 55%- 5 meals.   Factors Affecting Nutritional Intake: decreased appetite low but normal for patient.   Food/[employer]/Cultural Preferences: none reported  Food Allergies: shellfish and lactose intolerance    Skin Integrity:  (FRAGILE)  Wound(s):       Comments    (3/7): Patient has fair appetite, reports lactose intolerance and willing to try ONS. Reports UBW- 130#-140#. Doesn't eat much beef but loves fruit, vegetables and grits. Patient denied difficulty c/s as well as unintentional weight loss.     Anthropometrics    Height: 5' 6" (167.6 cm) Height Method: Stated  Last Weight: 60.9 kg (134 lb 3.2 oz) (23) Weight Method: Bed Scale  BMI (Calculated): 21.7  BMI Classification: underweight (BMI less than 22 if >65 years of age)     Ideal Body Weight (IBW), Female: 130 lb     % Ideal Body Weight, Female (lb): 103.85 %                    Usual Body Weight (UBW), k.36 kg  % Usual Body Weight: 99.41     Usual Weight Provided By: patient    Wt Readings from Last 3 Encounters:   23 60.9 kg (134 lb " 3.2 oz)   03/05/23 1912 60.8 kg (134 lb 1.6 oz)   03/04/23 1913 60.6 kg (133 lb 9.6 oz)   03/03/23 2219 133 kg (293 lb 3.4 oz)   03/02/23 1924 132 kg (291 lb 0.1 oz)   02/28/23 2303 59.9 kg (132 lb 1 oz)   02/27/23 2125 61.2 kg (135 lb)   08/10/22 1308 59.1 kg (130 lb 6.4 oz)   07/06/22 0814 58.9 kg (129 lb 12.8 oz)      Weight Change(s) Since Admission:  Admit Weight: 61.2 kg (135 lb) (02/27/23 2125)      Patient Education    Not applicable.    Monitoring & Evaluation     Dietitian will monitor food and beverage intake, energy intake, weight, weight change, and gastrointestinal profile.  Nutrition Risk/Follow-Up: low (follow-up in 5-7 days)  Patients assigned 'low nutrition risk' status do not qualify for a full nutritional assessment but will be monitored and re-evaluated in a 5-7 day time period. Please consult if re-evaluation needed sooner.

## 2023-03-07 NOTE — PT/OT/SLP PROGRESS
Physical Therapy Treatment    Patient Name:  Martha Caraballo   MRN:  [id number]    Recommendations:     Discharge Recommendations: home with home health  Discharge Equipment Recommendations: none  Barriers to discharge: None    Assessment:     Martha Caraballo is a 89 y.o. female admitted with a medical diagnosis of Chest pain.  She presents with the following impairments/functional limitations: weakness, impaired functional mobility, impaired balance, decreased lower extremity function Patient with decreased pain in feet today, but still has pain.    Rehab Prognosis: Good and Fair; patient would benefit from acute skilled PT services to address these deficits and reach maximum level of function.    Recent Surgery: * No surgery found *      Plan:     During this hospitalization, patient to be seen daily to address the identified rehab impairments via gait training, therapeutic activities, therapeutic exercises and progress toward the following goals:    Plan of Care Expires:  04/04/23    Subjective     Chief Complaint: pain in feet  Patient/Family Comments/goals: to be able to walk  Pain/Comfort:  Pain Rating 1: 5/10  Location - Side 1: Bilateral  Location 1: foot  Pain Addressed 1: Nurse notified, Reposition  Pain Rating Post-Intervention 1: 5/10      Objective:     Communicated with nursing prior to session.  Patient found supine with peripheral IV upon PT entry to room.     General Precautions: Standard, fall  Orthopedic Precautions: N/A  Braces: N/A  Respiratory Status: Room air     Functional Mobility:  Bed Mobility:     Supine to Sit: contact guard assistance  Sit to Supine: contact guard assistance  Transfers:     Sit to Stand:  moderate assistance with standard walker      AM-PAC 6 CLICK MOBILITY  Turning over in bed (including adjusting bedclothes, sheets and blankets)?: 3  Sitting down on and standing up from a chair with arms (e.g., wheelchair, bedside commode, etc.): 3  Moving from lying on back to sitting  on the side of the bed?: 3  Moving to and from a bed to a chair (including a wheelchair)?: 3  Need to walk in hospital room?: 1  Climbing 3-5 steps with a railing?: 1  Basic Mobility Total Score: 14       Treatment & Education:  See above, transfer x 2 reps plus supine therex    Patient left supine with all lines intact, call button in reach, and bed alarm on..    GOALS:   Multidisciplinary Problems       Physical Therapy Goals          Problem: Physical Therapy    Goal Priority Disciplines Outcome Goal Variances Interventions   Physical Therapy Goal     PT, PT/OT Ongoing, Progressing     Description: Goals to be met by: discharge     Patient will increase functional independence with mobility by performin. Sit to stand transfer with Supervision and Stand-by Assistance  2. Gait  x 50 feet with Stand-by Assistance using Standard Walker.                          Time Tracking:     PT Received On: 23  PT Start Time: 1515     PT Stop Time: 1530  PT Total Time (min): 15 min     Billable Minutes: Therapeutic Activity 15    Treatment Type: Treatment  PT/PTA: PT     PTA Visit Number: 0     2023

## 2023-03-07 NOTE — SUBJECTIVE & OBJECTIVE
Interval History:      Review of Systems   Constitutional:  Negative for activity change, appetite change and fever.   Respiratory:  Negative for chest tightness, shortness of breath and wheezing.    Cardiovascular:  Negative for chest pain.   Gastrointestinal:  Negative for abdominal pain, constipation, diarrhea, nausea and vomiting.   Genitourinary:  Negative for dysuria.   Musculoskeletal:  Positive for arthralgias (left shoulder).   Skin:  Negative for rash and wound.   Neurological:  Negative for dizziness, tremors, speech difficulty, light-headedness and headaches.        Had episode associated with lower blood pressure 0300 am   Psychiatric/Behavioral:  Negative for confusion.    Objective:     Vital Signs (Most Recent):  Temp: 98.8 °F (37.1 °C) (03/07/23 1049)  Pulse: 81 (03/07/23 1049)  Resp: 17 (03/07/23 1049)  BP: 121/64 (03/07/23 1049)  SpO2: 99 % (03/07/23 1049) Vital Signs (24h Range):  Temp:  [97.8 °F (36.6 °C)-98.8 °F (37.1 °C)] 98.8 °F (37.1 °C)  Pulse:  [76-84] 81  Resp:  [17-20] 17  SpO2:  [96 %-99 %] 99 %  BP: (120-153)/(52-65) 121/64     Weight: 60.9 kg (134 lb 3.2 oz)  Body mass index is 21.66 kg/m².    Intake/Output Summary (Last 24 hours) at 3/7/2023 1213  Last data filed at 3/7/2023 0907  Gross per 24 hour   Intake 360 ml   Output 970 ml   Net -610 ml        Physical Exam  Constitutional:       General: She is not in acute distress.     Appearance: Normal appearance. She is normal weight. She is not ill-appearing.   HENT:      Head: Normocephalic and atraumatic.   Eyes:      General: No scleral icterus.     Extraocular Movements: Extraocular movements intact.   Cardiovascular:      Rate and Rhythm: Normal rate and regular rhythm.      Pulses: Normal pulses.      Heart sounds: Normal heart sounds.   Pulmonary:      Effort: Pulmonary effort is normal.      Breath sounds: Normal breath sounds.   Abdominal:      General: Abdomen is flat. Bowel sounds are normal.      Palpations: Abdomen is soft.    Skin:     General: Skin is warm and dry.      Capillary Refill: Capillary refill takes less than 2 seconds.      Findings: No erythema, lesion or rash.   Neurological:      General: No focal deficit present.      Mental Status: She is alert and oriented to person, place, and time.   Psychiatric:         Mood and Affect: Mood normal.         Behavior: Behavior normal.         Thought Content: Thought content normal.       Significant Labs: All pertinent labs within the past 24 hours have been reviewed.  CBC:   Recent Labs   Lab 03/06/23 0426   WBC 7.8   HGB 8.9*   HCT 25.8*          CMP:   Recent Labs   Lab 03/06/23 0426   *   K 5.1   CO2 28   BUN 19.0   CREATININE 0.86   CALCIUM 8.5         Significant Imaging: I have reviewed all pertinent imaging results/findings within the past 24 hours.

## 2023-03-08 VITALS
HEART RATE: 85 BPM | DIASTOLIC BLOOD PRESSURE: 66 MMHG | HEIGHT: 66 IN | WEIGHT: 136 LBS | OXYGEN SATURATION: 96 % | SYSTOLIC BLOOD PRESSURE: 142 MMHG | RESPIRATION RATE: 18 BRPM | TEMPERATURE: 99 F | BODY MASS INDEX: 21.86 KG/M2

## 2023-03-08 LAB
ANION GAP SERPL CALC-SCNC: 5 MEQ/L (ref 2–13)
BASOPHILS # BLD AUTO: 0.03 X10(3)/MCL (ref 0.01–0.08)
BASOPHILS NFR BLD AUTO: 0.4 % (ref 0.1–1.2)
BUN SERPL-MCNC: 25 MG/DL (ref 7–20)
CALCIUM SERPL-MCNC: 8.4 MG/DL (ref 8.4–10.2)
CHLORIDE SERPL-SCNC: 100 MMOL/L (ref 98–110)
CO2 SERPL-SCNC: 27 MMOL/L (ref 21–32)
CREAT SERPL-MCNC: 0.99 MG/DL (ref 0.66–1.25)
CREAT/UREA NIT SERPL: 25 (ref 12–20)
EOSINOPHIL # BLD AUTO: 0.08 X10(3)/MCL (ref 0.04–0.36)
EOSINOPHIL NFR BLD AUTO: 1.1 % (ref 0.7–7)
ERYTHROCYTE [DISTWIDTH] IN BLOOD BY AUTOMATED COUNT: 15.6 % (ref 11–14.5)
GFR SERPLBLD CREATININE-BSD FMLA CKD-EPI: 54 MLS/MIN/1.73/M2
GLUCOSE SERPL-MCNC: 99 MG/DL (ref 70–115)
HCT VFR BLD AUTO: 25.5 % (ref 36–48)
HGB BLD-MCNC: 8.8 G/DL (ref 11.8–16)
IMM GRANULOCYTES # BLD AUTO: 0.03 X10(3)/MCL (ref 0–0.03)
IMM GRANULOCYTES NFR BLD AUTO: 0.4 % (ref 0–0.5)
LYMPHOCYTES # BLD AUTO: 1.08 X10(3)/MCL (ref 1.16–3.74)
LYMPHOCYTES NFR BLD AUTO: 15.2 % (ref 20–55)
MCH RBC QN AUTO: 28.1 PG (ref 27–34)
MCV RBC AUTO: 81.5 FL (ref 79–99)
MEAN CELL HEMOGLOBIN CONCENTRATION (OHS) G/DL: 34.5 G/DL (ref 31–37)
MONOCYTES # BLD AUTO: 1.11 X10(3)/MCL (ref 0.24–0.36)
MONOCYTES NFR BLD AUTO: 15.6 % (ref 4.7–12.5)
NEUTROPHILS # BLD AUTO: 4.79 X10(3)/MCL (ref 1.56–6.13)
NEUTROPHILS NFR BLD AUTO: 67.3 % (ref 37–73)
NRBC BLD AUTO-RTO: 0 % (ref 0–1)
PLATELET # BLD AUTO: 282 X10(3)/MCL (ref 140–371)
PMV BLD AUTO: 12 FL (ref 9.4–12.4)
POTASSIUM SERPL-SCNC: 4.7 MMOL/L (ref 3.5–5.1)
RBC # BLD AUTO: 3.13 X10(6)/MCL (ref 4–5.1)
SODIUM SERPL-SCNC: 132 MMOL/L (ref 135–145)
WBC # SPEC AUTO: 7.1 X10(3)/MCL (ref 4–11.5)

## 2023-03-08 PROCEDURE — 94761 N-INVAS EAR/PLS OXIMETRY MLT: CPT

## 2023-03-08 PROCEDURE — 97110 THERAPEUTIC EXERCISES: CPT | Mod: CQ

## 2023-03-08 PROCEDURE — 25000003 PHARM REV CODE 250: Performed by: FAMILY MEDICINE

## 2023-03-08 PROCEDURE — 85025 COMPLETE CBC W/AUTO DIFF WBC: CPT | Performed by: FAMILY MEDICINE

## 2023-03-08 PROCEDURE — 36415 COLL VENOUS BLD VENIPUNCTURE: CPT | Performed by: FAMILY MEDICINE

## 2023-03-08 PROCEDURE — 25000242 PHARM REV CODE 250 ALT 637 W/ HCPCS

## 2023-03-08 PROCEDURE — 63600175 PHARM REV CODE 636 W HCPCS: Performed by: FAMILY MEDICINE

## 2023-03-08 PROCEDURE — 25000003 PHARM REV CODE 250

## 2023-03-08 PROCEDURE — 80048 BASIC METABOLIC PNL TOTAL CA: CPT | Performed by: FAMILY MEDICINE

## 2023-03-08 PROCEDURE — 25000003 PHARM REV CODE 250: Performed by: NURSE PRACTITIONER

## 2023-03-08 RX ORDER — RANOLAZINE 1000 MG/1
1000 TABLET, EXTENDED RELEASE ORAL 2 TIMES DAILY
Qty: 60 TABLET | Refills: 11 | Status: SHIPPED | OUTPATIENT
Start: 2023-03-08 | End: 2024-03-07

## 2023-03-08 RX ADMIN — ATORVASTATIN CALCIUM 10 MG: 10 TABLET, FILM COATED ORAL at 08:03

## 2023-03-08 RX ADMIN — ASPIRIN 81 MG: 81 TABLET, COATED ORAL at 08:03

## 2023-03-08 RX ADMIN — RANOLAZINE 1000 MG: 500 TABLET, EXTENDED RELEASE ORAL at 08:03

## 2023-03-08 RX ADMIN — LINACLOTIDE 72 MCG: 72 CAPSULE, GELATIN COATED ORAL at 08:03

## 2023-03-08 RX ADMIN — FLUTICASONE PROPIONATE 50 MCG: 50 SPRAY, METERED NASAL at 08:03

## 2023-03-08 RX ADMIN — PANTOPRAZOLE SODIUM 40 MG: 40 TABLET, DELAYED RELEASE ORAL at 08:03

## 2023-03-08 RX ADMIN — ENOXAPARIN SODIUM 60 MG: 60 INJECTION SUBCUTANEOUS at 08:03

## 2023-03-08 RX ADMIN — GABAPENTIN 400 MG: 400 CAPSULE ORAL at 08:03

## 2023-03-08 RX ADMIN — ISOSORBIDE MONONITRATE 60 MG: 30 TABLET, EXTENDED RELEASE ORAL at 08:03

## 2023-03-08 RX ADMIN — MAGNESIUM OXIDE 400 MG: 400 TABLET ORAL at 08:03

## 2023-03-08 RX ADMIN — Medication 2 DROP: at 09:03

## 2023-03-08 RX ADMIN — Medication 1 TABLET: at 08:03

## 2023-03-08 RX ADMIN — Medication 2 DROP: at 05:03

## 2023-03-08 NOTE — PLAN OF CARE
03/08/23 0946   Medicare Message   Important Message from Medicare regarding Discharge Appeal Rights Given to patient/caregiver   Date IMM was signed 03/01/23   Time IMM was signed [id number]

## 2023-03-08 NOTE — PLAN OF CARE
Rec'd call from [facility] stating patient is approved for admit today. Nursing and MD informed of approval.

## 2023-03-08 NOTE — DISCHARGE SUMMARY
Hospital Medicine  Discharge Summary    Patient Name: Martha Caraballo  MRN: [id number]  Admit Date: 2/27/2023  Discharge Date:    Status: IP- Inpatient   Length of Stay: 8      PHYSICIANS   Admitting Physician: Juan Khan MD  Discharging Physician: Juan Khan MD.  Primary Care Physician: Jordan Sanchez MD        DISCHARGE DIAGNOSES     Chest pain  Trend cardiac enzymes  troponins have been negative  No chest pain        Burning sensation of foot  Rule out neuropathy  Add gabepentin 100mg tid        Orthostatic hypotension  Definitely orthostatic on BP, HR 70-80 no significant change  Dr. Silva recommends continue imdur and labetolol hold losartan and amlodipine  Hold labetalol.  Recheck orthostatics in a.m..     Hypomagnesemia  Magnesium reviewed-   No results for input(s): MG in the last 48 hours.   Will replace magnesium and continue to monitor.  Gave another 2gm iv and will add po replacement              HLD (hyperlipidemia)   Patient is chronically on statin.will continue for now. Monitor clinically  Aortic valve stenosis  Trend cardiac enxymes  CIS following        HTN (hypertension)       Coronary artery disease  CIS consulted and trend enzymes  Increased ranexa  Continue statin  lovenox high dose  Continue amlodipine        Iron deficiency anemia     Iron sat is 19%  V enofer daily        PROCEDURES         HOSPITAL COURSE     89 y.o. female pt of Dr. Sanchez in ,  with a past medical history of moderate aortic stenosis, hypertension, hyperlipidemia, TIA, gastroesophageal reflux disease, osteoarthritis and coronary artery disease, unclear if she had any intervention in the past presents to the ER on account of left-sided chest pain over the past few days.    Patient has been at her usual state of health until about 6 days ago when she developed left-sided chest pain, pressure-like, about 5/10 in intensity, radiating to the left upper extremities with no aggravating or relieving factor.   She admits occasional shortness of breath.  No dizziness or loss of consciousness.  She decided to come to the ER for further evaluation.    At the ER, cardiac enzymes not remarkable.  Chest x-ray shows no acute findings        Overview/Hospital Course:   2023 spoke with Dr. Silva, wants therapeutic dose lovenox x 48 hrs, add amlodipine and increased ranexa.  Current cardiac enzymes are negative.  Pt does admit to some 2-3/10 left sided chest pain.  2023 troponin have been negative, no chest pain reproducible component left shoulder pain  2023 troponin negative, no further chest pain, pt had episode of dizziness and confusion associated with transient low BP when she got up at 0300 am overnight to ambulate to the bathroom, called on call telemed and gave 500cc bolus of ivf and her symtpoms have imrpoved. No symptoms at present and she is back to her baseline mental status.  2023 pt still experiencing some fluctuations in BP.  Orthostatic vitals   Llyin/58  Sittin/57  Standin/49  spoke with DR. Silva recommends continue imdur and labetolol d/c losartan and amlodipine  2023 patient still with orthostatic hypotension despite discontinuing the losartan and amlodipine.  He is very weak having a hard time even going to the bathroom and doing activities of daily living.  I will put labetalol on hold consult [facility]  2023 since discontinuing the labetalol her orthostatic hypotension has resolved.  She is tolerating physical therapy but remains very weak.  Plans are for her to go to rehab tomorrow.  2023 pt awake and alert, c/o pain on feet on the bottom very tender and hypersensitive, difficulty walking due to severe burning pain, s tarted yesterday.  2023 feel better today, BP improved this AM       STATUS  Improved, Stable    DISPOSITION  [facility]    DIET  cardiac    ACTIVITY  As  tolerated      FOLLOW-UP         DISCHARGE MEDICATION RECONCILIATION        Medication List        CHANGE how you take these medications      ranolazine 1,000 mg Tb12  Commonly known as: RANEXA  Take 1 tablet (1,000 mg total) by mouth 2 (two) times daily.  What changed:   medication strength  how much to take            CONTINUE taking these medications      aspirin 81 MG EC tablet  Commonly known as: ECOTRIN     cycloSPORINE 0.05 % ophthalmic emulsion  Commonly known as: RESTASIS     dicyclomine 20 mg tablet  Commonly known as: BENTYL     fluticasone propionate 50 mcg/actuation nasal spray  Commonly known as: FLONASE  1 SPRAY INTO EACH NOSTRIL ONCE DAILY.     gabapentin 300 MG capsule  Commonly known as: NEURONTIN  Take 1 capsule (300 mg total) by mouth 3 (three) times daily.     HEMATINIC/FOLIC ACID Tab  Generic drug: ferrous fumarate-folic acid 324 mg (106mg iron)-1mg  TAKE 1 TABLET BY MOUTH EVERY DAY     HYDROcodone-acetaminophen 5-325 mg per tablet  Commonly known as: NORCO  Take 1 tablet by mouth every 6 (six) hours as needed for Pain.     isosorbide mononitrate 60 MG 24 hr tablet  Commonly known as: IMDUR  TAKE 1 TABLET BY MOUTH ONCE DAILY     linaCLOtide 72 mcg Cap capsule  Commonly known as: LINZESS     nitroGLYCERIN 0.4 MG SL tablet  Commonly known as: NITROSTAT  Place 1 tablet (0.4 mg total) under the tongue every 5 (five) minutes as needed for Chest pain.     pantoprazole 40 MG tablet  Commonly known as: PROTONIX     rosuvastatin 5 MG tablet  Commonly known as: CRESTOR            STOP taking these medications      labetaloL 100 MG tablet  Commonly known as: NORMODYNE     losartan 50 MG tablet  Commonly known as: COZAAR               Where to Get Your Medications        These medications were sent to [facility]/pharmacy #[id number] - [place] - [address]  [address] [address]      Phone: [phone]   ranolazine 1,000 mg Tb12           PHYSICAL EXAM   VITALS: T 98.8 °F (37.1 °C)   BP (!) 142/66    P 85   RR 18   O2 96 %    Physical Exam  Vitals reviewed.   HENT:      Head: Normocephalic and atraumatic.   Cardiovascular:      Rate and Rhythm: Normal rate.      Heart sounds: Normal heart sounds.   Pulmonary:      Effort: Pulmonary effort is normal.      Breath sounds: Normal breath sounds.   Neurological:      Mental Status: She is alert.            Discharge time: 33 minutes     Juan Khan MD  [facility]       DIAGNOSITCS   CBC:   Recent Labs   Lab 03/06/23  0426 03/07/23  1226 03/08/23  0515   WBC 7.8 6.8 7.1   HGB 8.9* 8.9* 8.8*   HCT 25.8* 25.5* 25.5*    267 282     COAG:  No results for input(s): APTT, INR, PTT in the last 168 hours.  CMP:   Recent Labs   Lab 03/02/23  0444 03/03/23  0526 03/05/23  0510 03/06/23  0426 03/07/23  1226 03/08/23  0514   CALCIUM 8.5 9.0 8.3* 8.5 8.7 8.4   ALBUMIN 3.2* 3.3* 3.3*  --   --   --     135 135 134* 131* 132*   K 4.5 4.8 5.0 5.1 4.5 4.7   CO2 26 27 27 28 25 27   BUN 18.0 17.0 22.0* 19.0 19.0 25.0*   CREATININE 0.90 0.87 0.90 0.86 0.86 0.99   ALKPHOS 39* 42* 40*  --   --   --    ALT <4 <4 <4  --   --   --    AST 17 18 27  --   --   --    BILITOT 0.5 0.1 0.4  --   --   --    MG 1.60* 2.10  --   --   --   --      Estimated Creatinine Clearance: 33.9 mL/min (based on SCr of 0.99 mg/dL).  CARDIAC ENZYMES: No results for input(s): TROPONINI, CPK, CKMB in the last 72 hours.     No results for input(s): AMYLASE, LIPASE in the last 168 hours.      No results for input(s): POCTGLUCOSE in the last 72 hours.     Microbiology Results (last 7 days)       ** No results found for the last 168 hours. **               X-Ray Chest AP Portable    Result Date: 2/28/2023  EXAMINATION: STUDY: XR CHEST AP PORTABLE CLINICAL HISTORY AND TECHNIQUE: RT Padmini on 2/27/2023  9:54 PM CLINICAL HX: ER X 2 WEEKS - CP KNOWN CURRENT OBSTRUCTED STENT PMH: CAD, HTN, TIA, ACID REFLUX, SUMAN TECH: 1 VIEW OF CHEST TECHNOLOGIST: PATRICE COMPARISON: 09/18/2022 FINDINGS: Mildly  increased interstitial lung markings are present as seen previously.The cardiac, hilar, and mediastinal contours appear unremarkable.I see no lobar or segmental infiltrates.No significant pleural effusions are noted.There is moderate demineralization of the skeletal structures with mild degenerative changes noted involving the thoracic spine.     1. I see no lobar or segmental infiltrates or other significant abnormalities.See above comments. Electronically signed by: Jessa Bolden Date:    02/28/2023 Time:    04:58    Echo    Result Date: 2/28/2023  · The left ventricle is normal in size with normal systolic function. LVEF 55-60%. · Indeterminate left ventricular diastolic function. · Normal right ventricular size with normal right ventricular systolic function. · Moderate mitral regurgitation. · Mild to moderate tricuspid regurgitation. · There is moderate aortic valve stenosis. CAMILA 1.09 cm2; peak velocity is 2.78 m/s; mean gradient is 14 mmHg; DI 0.39. · The estimated PA systolic pressure is 51 mmHg. There is pulmonary hypertension.      US Lower Extrem Arteries Bilat with CHIKI (xpd)    Result Date: 3/7/2023  EXAMINATION: STUDY: US ARTERIAL LOWER EXTREMITY BILAT WITH CHIKI (XPD) CLINICAL HISTORY AND TECHNIQUE: Moise Araya RT on 3/7/2023  1:33 PM CLINICAL HX: -- LLE PAIN AND TENDERNESS DISTALLY, SUDDEN ONSET. DIFFICULTY WALKING. NO COLD DIST EXTREMITIES PMH: RLE STENTS TECHNIQUE: 2D VASCULAR ULTRASOUND BLE ARTERIES WITH COLOR MAPPING, POWER DOPPLER AND M MODE TECHNOLOGIST: [facility] COMPARISON: None FINDINGS: Right lower extremity: External iliac artery: Peak systolic flow velocity is 66 cm/s with multiphasic waveforms. Common femoral artery: Peak systolic flow velocity is 102 cm/s with multiphasic waveforms. Deep femoral artery: Peak systolic flow velocity is 45 cm/s with multiphasic waveforms. Superficial femoral artery (proximal): Peak systolic flow velocity is 80 cm/s with multiphasic waveforms. Superficial  femoral artery (middle): Peak systolic flow velocity is 80 cm/s with multiphasic waveforms. Superficial femoral artery (distal): Peak systolic flow velocity is 57 cm/s with multiphasic waveforms. Popliteal artery: Peak systolic flow velocity is 54 cm/s with multiphasic waveforms. Peroneal artery: Peak systolic flow velocity is 44 cm/s with monophasic, continuous flow waveforms within end-diastolic flow velocity of 8 centimeters/second. Posterior tibial artery: Peak systolic flow velocity is 18 cm/s with monophasic, continuous flow waveforms within end-diastolic flow velocity of 7 centimeters/second. Anterior tibial artery: Peak systolic flow velocity is 47 cm/s with monophasic, continuous flow waveforms within end-diastolic flow velocity of 18 centimeters/second. Dorsalis pedis artery: Peak systolic flow velocity is 61 cm/s with monophasic, continuous flow waveforms within end-diastolic flow velocity of 26 centimeters/second. Left lower extremity: External iliac artery: Peak systolic flow velocity is 112 cm/s with multiphasic waveforms. Common femoral artery: Peak systolic flow velocity is 84 cm/s with multiphasic waveforms. Deep femoral artery: Peak systolic flow velocity is 79 cm/s with multiphasic waveforms. Superficial femoral artery (proximal): Peak systolic flow velocity is 85 cm/s with multiphasic waveforms. Superficial femoral artery (middle): Peak systolic flow velocity is 99 cm/s with multiphasic waveforms. Superficial femoral artery (distal): Peak systolic flow velocity is 69 cm/s with multiphasic waveforms. Popliteal artery: Peak systolic flow velocity is 71 cm/s with multiphasic waveforms. Peroneal artery: Peak systolic flow velocity is 53 cm/s with multiphasic waveforms. Posterior tibial artery: Peak systolic flow velocity is 41 cm/s with monophasic, continuous flow waveforms within end-diastolic flow velocity of 11 centimeters/second. Anterior tibial artery: Peak systolic flow velocity is 19 cm/s with  monophasic, continuous flow waveforms within end-diastolic flow velocity of 5 centimeters/second. Dorsalis pedis artery: Peak systolic flow velocity is 26 cm/s with monophasic, continuous flow waveforms within end-diastolic flow velocity of 12 centimeters/second. ABIs are abnormal bilaterally (0.6 or less bilaterally) indicative of PAD.     1. Abnormal waveforms are noted within the distal arterial vasculature of both lower extremities suggesting a high probability of diffuse atherosclerotic plaquing in numerous short segmental stenotic segments of at least 50-70% within the right peroneal artery and both posterior tibial arteries and anterior tibial arteries/dorsalis pedis arteries.  There is three-vessel runoff into both distal lower extremities. 2. ABIs are abnormal bilaterally (0.6 or less bilaterally) indicative of PAD. Electronically signed by: Jessa Bolden Date:    03/07/2023 Time:    13:38

## 2023-03-08 NOTE — PLAN OF CARE
Patient's sister-Danita Vora notified of [facility].  She states she will notify the patient's children.   no

## 2023-03-08 NOTE — PLAN OF CARE
03/08/23 0853   Final Note   Assessment Type Final Discharge Note   Anticipated Discharge Disposition Rehab   What phone number can be called within the next 1-3 days to see how you are doing after discharge? [phone]   Post-Acute Status   Post-Acute Authorization Placement   Post-Acute Placement Status Set-up Complete/Auth obtained   Coverage [facility]   Discharge Delays None known at this time

## 2023-03-08 NOTE — PLAN OF CARE
[facility]. [facility] was notified per phone/fax of pt's discharge, spoke with Marcella. [facility] nurse to call [facility] nurse when ready for report on patient.

## 2023-03-08 NOTE — PT/OT/SLP PROGRESS
Physical Therapy Treatment    Patient Name:  Martha Caraballo   MRN:  [id number]    Recommendations:     Discharge Recommendations: home with home health  Discharge Equipment Recommendations: none  Barriers to discharge: None    Assessment:     Martha Caraballo is a 89 y.o. female admitted with a medical diagnosis of Chest pain.  She presents with the following impairments/functional limitations: weakness, impaired functional mobility, impaired balance, decreased lower extremity function .    Rehab Prognosis: Fair; patient would benefit from acute skilled PT services to address these deficits and reach maximum level of function.    Recent Surgery: * No surgery found *      Plan:     During this hospitalization, patient to be seen daily to address the identified rehab impairments via gait training, therapeutic activities, therapeutic exercises and progress toward the following goals:    Plan of Care Expires:  04/04/23    Subjective     Chief Complaint: Lt LE pain and weakness  Patient/Family Comments/goals: improve strength and balance for tf and gait.  Pain/Comfort:  Pain Rating 1: 5/10      Objective:     Communicated with nsg prior to session.  Patient found up in chair with   upon PT entry to room.     General Precautions: Standard, fall  Orthopedic Precautions: N/A  Braces: N/A  Respiratory Status: Room air     Functional Mobility:  Bed Mobility:     Rolling Right: minimum assistance  Transfers:     Sit to Stand:  moderate assistance with standard walker  Bed to Chair: moderate assistance with  standard walker  using  Stand Pivot      AM-PAC 6 CLICK MOBILITY  Turning over in bed (including adjusting bedclothes, sheets and blankets)?: 3  Sitting down on and standing up from a chair with arms (e.g., wheelchair, bedside commode, etc.): 2  Moving from lying on back to sitting on the side of the bed?: 3  Moving to and from a bed to a chair (including a wheelchair)?: 2  Need to walk in hospital room?: 1  Climbing 3-5  steps with a railing?: 1  Basic Mobility Total Score: 12       Treatment & Education:  Pt performed bilateral LE therex x 10 x 3 in sitting.    Patient left up in chair with call button in reach..    GOALS:   Multidisciplinary Problems       Physical Therapy Goals          Problem: Physical Therapy    Goal Priority Disciplines Outcome Goal Variances Interventions   Physical Therapy Goal     PT, PT/OT Ongoing, Progressing     Description: Goals to be met by: discharge     Patient will increase functional independence with mobility by performin. Sit to stand transfer with Supervision and Stand-by Assistance  2. Gait  x 50 feet with Stand-by Assistance using Standard Walker.                          Time Tracking:     PT Received On: 23  PT Start Time: 730     PT Stop Time: 45  PT Total Time (min): 15 min     Billable Minutes: Therapeutic Exercise 15    Treatment Type: Treatment  PT/PTA: PTA     PTA Visit Number: 1     2023

## 2023-10-23 ENCOUNTER — TELEPHONE (OUTPATIENT)
Dept: PRIMARY CARE CLINIC | Facility: CLINIC | Age: 88
End: 2023-10-23
Payer: MEDICARE

## 2023-10-23 NOTE — TELEPHONE ENCOUNTER
----- Message from Pedro Collins sent at 10/20/2023  1:14 PM CDT -----  Contact: Caitlyn from [facility]  States she is calling to follow up on a fax that was sent on 10/18 and can be reached at [phone]//[email or web address]

## 2023-10-23 NOTE — TELEPHONE ENCOUNTER
Attempted to contact Caitlyn with [facility], to advise per provider pt will need a appointment. LVM to return call to clinic if she has anymore questions.

## 2025-05-16 NOTE — PLAN OF CARE
Problem: Physical Therapy  Goal: Physical Therapy Goal  Description: Goals to be met by: discharge     Patient will increase functional independence with mobility by performin. Sit to stand transfer with Supervision and Stand-by Assistance  2. Gait  x 50 feet with Stand-by Assistance using Standard Walker.     Outcome: Ongoing, Progressing      Spoke with roula, last dose of therapeutic lovenox is 5/17 in am.